# Patient Record
Sex: MALE | Race: BLACK OR AFRICAN AMERICAN | NOT HISPANIC OR LATINO | URBAN - METROPOLITAN AREA
[De-identification: names, ages, dates, MRNs, and addresses within clinical notes are randomized per-mention and may not be internally consistent; named-entity substitution may affect disease eponyms.]

---

## 2020-04-05 ENCOUNTER — EMERGENCY (EMERGENCY)
Facility: HOSPITAL | Age: 75
LOS: 0 days | Discharge: HOME | End: 2020-04-05
Attending: EMERGENCY MEDICINE
Payer: MEDICARE

## 2020-04-05 VITALS
DIASTOLIC BLOOD PRESSURE: 73 MMHG | OXYGEN SATURATION: 98 % | HEART RATE: 77 BPM | RESPIRATION RATE: 16 BRPM | TEMPERATURE: 98 F | SYSTOLIC BLOOD PRESSURE: 118 MMHG

## 2020-04-05 VITALS
TEMPERATURE: 99 F | DIASTOLIC BLOOD PRESSURE: 66 MMHG | SYSTOLIC BLOOD PRESSURE: 133 MMHG | OXYGEN SATURATION: 97 % | HEIGHT: 63 IN | RESPIRATION RATE: 16 BRPM | HEART RATE: 67 BPM | WEIGHT: 154.98 LBS

## 2020-04-05 DIAGNOSIS — Z88.2 ALLERGY STATUS TO SULFONAMIDES: ICD-10-CM

## 2020-04-05 DIAGNOSIS — K57.32 DIVERTICULITIS OF LARGE INTESTINE WITHOUT PERFORATION OR ABSCESS WITHOUT BLEEDING: ICD-10-CM

## 2020-04-05 DIAGNOSIS — K62.5 HEMORRHAGE OF ANUS AND RECTUM: ICD-10-CM

## 2020-04-05 DIAGNOSIS — Z79.82 LONG TERM (CURRENT) USE OF ASPIRIN: ICD-10-CM

## 2020-04-05 DIAGNOSIS — I10 ESSENTIAL (PRIMARY) HYPERTENSION: ICD-10-CM

## 2020-04-05 DIAGNOSIS — K40.90 UNILATERAL INGUINAL HERNIA, WITHOUT OBSTRUCTION OR GANGRENE, NOT SPECIFIED AS RECURRENT: Chronic | ICD-10-CM

## 2020-04-05 LAB
ALBUMIN SERPL ELPH-MCNC: 3.2 G/DL — LOW (ref 3.3–5)
ALP SERPL-CCNC: 50 U/L — SIGNIFICANT CHANGE UP (ref 40–120)
ALT FLD-CCNC: 29 U/L — SIGNIFICANT CHANGE UP (ref 12–78)
ANION GAP SERPL CALC-SCNC: 6 MMOL/L — SIGNIFICANT CHANGE UP (ref 5–17)
APTT BLD: 25.5 SEC — LOW (ref 28.5–37)
AST SERPL-CCNC: 32 U/L — SIGNIFICANT CHANGE UP (ref 15–37)
BASOPHILS # BLD AUTO: 0 K/UL — SIGNIFICANT CHANGE UP (ref 0–0.2)
BASOPHILS NFR BLD AUTO: 0 % — SIGNIFICANT CHANGE UP (ref 0–2)
BILIRUB SERPL-MCNC: 0.9 MG/DL — SIGNIFICANT CHANGE UP (ref 0.2–1.2)
BLD GP AB SCN SERPL QL: SIGNIFICANT CHANGE UP
BUN SERPL-MCNC: 15 MG/DL — SIGNIFICANT CHANGE UP (ref 7–23)
CALCIUM SERPL-MCNC: 8.3 MG/DL — LOW (ref 8.5–10.1)
CHLORIDE SERPL-SCNC: 104 MMOL/L — SIGNIFICANT CHANGE UP (ref 96–108)
CO2 SERPL-SCNC: 30 MMOL/L — SIGNIFICANT CHANGE UP (ref 22–31)
CREAT SERPL-MCNC: 1.08 MG/DL — SIGNIFICANT CHANGE UP (ref 0.5–1.3)
EOSINOPHIL # BLD AUTO: 0.46 K/UL — SIGNIFICANT CHANGE UP (ref 0–0.5)
EOSINOPHIL NFR BLD AUTO: 10 % — HIGH (ref 0–6)
GLUCOSE SERPL-MCNC: 110 MG/DL — HIGH (ref 70–99)
HCT VFR BLD CALC: 36.3 % — LOW (ref 39–50)
HGB BLD-MCNC: 12.3 G/DL — LOW (ref 13–17)
INR BLD: 1.13 RATIO — SIGNIFICANT CHANGE UP (ref 0.88–1.16)
LYMPHOCYTES # BLD AUTO: 0.64 K/UL — LOW (ref 1–3.3)
LYMPHOCYTES # BLD AUTO: 14 % — SIGNIFICANT CHANGE UP (ref 13–44)
MANUAL SMEAR VERIFICATION: SIGNIFICANT CHANGE UP
MCHC RBC-ENTMCNC: 30.4 PG — SIGNIFICANT CHANGE UP (ref 27–34)
MCHC RBC-ENTMCNC: 33.9 GM/DL — SIGNIFICANT CHANGE UP (ref 32–36)
MCV RBC AUTO: 89.6 FL — SIGNIFICANT CHANGE UP (ref 80–100)
MONOCYTES # BLD AUTO: 0.83 K/UL — SIGNIFICANT CHANGE UP (ref 0–0.9)
MONOCYTES NFR BLD AUTO: 18 % — HIGH (ref 2–14)
NEUTROPHILS # BLD AUTO: 2.67 K/UL — SIGNIFICANT CHANGE UP (ref 1.8–7.4)
NEUTROPHILS NFR BLD AUTO: 58 % — SIGNIFICANT CHANGE UP (ref 43–77)
NRBC # BLD: 0 /100 — SIGNIFICANT CHANGE UP (ref 0–0)
NRBC # BLD: SIGNIFICANT CHANGE UP /100 WBCS (ref 0–0)
PLAT MORPH BLD: NORMAL — SIGNIFICANT CHANGE UP
PLATELET # BLD AUTO: 135 K/UL — LOW (ref 150–400)
POTASSIUM SERPL-MCNC: 3.6 MMOL/L — SIGNIFICANT CHANGE UP (ref 3.5–5.3)
POTASSIUM SERPL-SCNC: 3.6 MMOL/L — SIGNIFICANT CHANGE UP (ref 3.5–5.3)
PROT SERPL-MCNC: 7 GM/DL — SIGNIFICANT CHANGE UP (ref 6–8.3)
PROTHROM AB SERPL-ACNC: 12.7 SEC — SIGNIFICANT CHANGE UP (ref 10–12.9)
RBC # BLD: 4.05 M/UL — LOW (ref 4.2–5.8)
RBC # FLD: 12.3 % — SIGNIFICANT CHANGE UP (ref 10.3–14.5)
RBC BLD AUTO: NORMAL — SIGNIFICANT CHANGE UP
SODIUM SERPL-SCNC: 140 MMOL/L — SIGNIFICANT CHANGE UP (ref 135–145)
WBC # BLD: 4.6 K/UL — SIGNIFICANT CHANGE UP (ref 3.8–10.5)
WBC # FLD AUTO: 4.6 K/UL — SIGNIFICANT CHANGE UP (ref 3.8–10.5)

## 2020-04-05 PROCEDURE — 74177 CT ABD & PELVIS W/CONTRAST: CPT | Mod: 26

## 2020-04-05 PROCEDURE — 99285 EMERGENCY DEPT VISIT HI MDM: CPT

## 2020-04-05 RX ORDER — IOHEXOL 300 MG/ML
30 INJECTION, SOLUTION INTRAVENOUS ONCE
Refills: 0 | Status: COMPLETED | OUTPATIENT
Start: 2020-04-05 | End: 2020-04-05

## 2020-04-05 RX ORDER — MOXIFLOXACIN HYDROCHLORIDE TABLETS, 400 MG 400 MG/1
1 TABLET, FILM COATED ORAL
Qty: 20 | Refills: 0
Start: 2020-04-05 | End: 2020-04-14

## 2020-04-05 RX ORDER — METRONIDAZOLE 500 MG
1 TABLET ORAL
Qty: 21 | Refills: 0
Start: 2020-04-05 | End: 2020-04-11

## 2020-04-05 RX ORDER — CIPROFLOXACIN LACTATE 400MG/40ML
500 VIAL (ML) INTRAVENOUS ONCE
Refills: 0 | Status: COMPLETED | OUTPATIENT
Start: 2020-04-05 | End: 2020-04-05

## 2020-04-05 RX ORDER — METRONIDAZOLE 500 MG
500 TABLET ORAL ONCE
Refills: 0 | Status: COMPLETED | OUTPATIENT
Start: 2020-04-05 | End: 2020-04-05

## 2020-04-05 RX ADMIN — Medication 500 MILLIGRAM(S): at 06:43

## 2020-04-05 RX ADMIN — IOHEXOL 30 MILLILITER(S): 300 INJECTION, SOLUTION INTRAVENOUS at 03:19

## 2020-04-05 RX ADMIN — Medication 500 MILLIGRAM(S): at 06:44

## 2020-04-05 NOTE — ED PROVIDER NOTE - OBJECTIVE STATEMENT
Pertinent PMH/PSH/FHx/SHx and Review of Systems contained within:    74yo M w PMH of diverticulitis, HTN on 81mg ASA presents to ED for eval of bloody stools tonight.  Pt states sx similar to previous episode of diverticulitis.  Denies fever, chills, abd pain Pertinent PMH/PSH/FHx/SHx and Review of Systems contained within:    74yo M w PMH of diverticulitis, HTN on 81mg ASA presents to ED for eval of bloody stools tonight.  Pt states sx similar to previous episode of diverticulitis.  Denies fever, chills, abd pain, dizziness, SOB, cough.     No fever/chills, No photophobia/eye pain/changes in vision, No ear pain/sore throat/dysphagia, No chest pain/palpitations, no SOB/cough/wheeze/stridor, No abdominal pain, No neck/back pain, no rash, no changes in neurological status/function.

## 2020-04-05 NOTE — ED PROVIDER NOTE - PHYSICAL EXAMINATION
Gen: Alert, NAD  Head: NC, AT, EOMI, normal lids/conjunctiva  ENT: normal hearing, patent oropharynx, MMM  Neck: supple, no tenderness/meningismus/JVD, Trachea midline  Pulm: Bilateral clear BS, normal resp effort, no wheeze/stridor/retractions  CV: RRR, no M/R/G, +dist pulses  Abd: soft, NT/ND, +BS, no guarding/rebound tenderness  Mskel: no edema/erythema/cyanosis  Skin: no rash  Neuro: AAOx3, no sensory/motor deficits

## 2020-04-05 NOTE — ED PROVIDER NOTE - CLINICAL SUMMARY MEDICAL DECISION MAKING FREE TEXT BOX
p H+H stable, given first dose of abx in ED Pt w above dx, H+H stable, given first dose of abx in ED.  Pt given Rx and instructed/cautioned on use. Discussed results and outcome of testing with the patient, given copy as well.  Patient advised to please follow up with their primary care doctor within the next 24 hours and return to the Emergency Department for worsening symptoms or any other concerns.  Patient advised that their doctor may call  to follow up on the specific results of the tests performed today in the emergency department.

## 2020-04-05 NOTE — ED ADULT NURSE NOTE - CAS EDN DISCHARGE ASSESSMENT
Alert and oriented to person, place and time/Awake/Dressing clean and dry/No adverse reaction to first time med in ED

## 2020-04-05 NOTE — ED ADULT NURSE NOTE - NS SC CAGE ALCOHOL ANNOYED YOU
"Requested Prescriptions   Pending Prescriptions Disp Refills     sertraline (ZOLOFT) 50 MG tablet [Pharmacy Med Name: SERTRALINE HCL 50MG TABS]  Last Written Prescription Date:  7/24/2019  Last Fill Quantity: 90 tablet,  # refills: 0   Last office visit: 9/9/2019 with prescribing provider:  Jody     Future Office Visit:       90 tablet 0     Sig: TAKE ONE-HALF TABLET (25 MG) BY MOUTH EVERY DAY FOR 1-2 WEEKS THEN INCREASE TO 1 TABLET (50 MG) DAILY THEREAFTER       SSRIs Protocol Passed - 10/23/2019  8:09 AM        Passed - PHQ-9 score less than 5 in past 6 months     Please review last PHQ-9 score.     PHQ-9 SCORE 7/13/2017 7/24/2019 9/9/2019   PHQ-9 Total Score - - -   PHQ-9 Total Score MyChart - - -   PHQ-9 Total Score 4 15 3     CRISTINA-7 SCORE 7/13/2017 7/24/2019 9/9/2019   Total Score - - -   Total Score - - -   Total Score 3 10 1           Passed - Medication is active on med list        Passed - Patient is age 18 or older        Passed - No active pregnancy on record        Passed - No positive pregnancy test in last 12 months        Passed - Recent (6 mo) or future (30 days) visit within the authorizing provider's specialty     Patient had office visit in the last 6 months or has a visit in the next 30 days with authorizing provider or within the authorizing provider's specialty.  See \"Patient Info\" tab in inbasket, or \"Choose Columns\" in Meds & Orders section of the refill encounter.            " no

## 2020-04-05 NOTE — ED ADULT TRIAGE NOTE - CHIEF COMPLAINT QUOTE
pt c/o rectal bleeding.  states he has hx of diverticulitis and this is what happens when he has a flair up

## 2020-04-05 NOTE — ED ADULT NURSE NOTE - NSIMPLEMENTINTERV_GEN_ALL_ED
Implemented All Universal Safety Interventions:  [place] to call system. Call bell, personal items and telephone within reach. Instruct patient to call for assistance. Room bathroom lighting operational. Non-slip footwear when patient is off stretcher. Physically safe environment: no spills, clutter or unnecessary equipment. Stretcher in lowest position, wheels locked, appropriate side rails in place.

## 2022-04-24 ENCOUNTER — INPATIENT (INPATIENT)
Facility: HOSPITAL | Age: 77
LOS: 6 days | Discharge: HOME | End: 2022-05-01
Attending: FAMILY MEDICINE | Admitting: FAMILY MEDICINE
Payer: MEDICARE

## 2022-04-24 VITALS
HEIGHT: 63 IN | TEMPERATURE: 98 F | SYSTOLIC BLOOD PRESSURE: 152 MMHG | OXYGEN SATURATION: 98 % | HEART RATE: 64 BPM | RESPIRATION RATE: 19 BRPM | DIASTOLIC BLOOD PRESSURE: 76 MMHG | WEIGHT: 149.91 LBS

## 2022-04-24 DIAGNOSIS — K40.90 UNILATERAL INGUINAL HERNIA, WITHOUT OBSTRUCTION OR GANGRENE, NOT SPECIFIED AS RECURRENT: Chronic | ICD-10-CM

## 2022-04-24 LAB
ALBUMIN SERPL ELPH-MCNC: 3.4 G/DL — SIGNIFICANT CHANGE UP (ref 3.3–5)
ALP SERPL-CCNC: 55 U/L — SIGNIFICANT CHANGE UP (ref 40–120)
ALT FLD-CCNC: 21 U/L — SIGNIFICANT CHANGE UP (ref 12–78)
ANION GAP SERPL CALC-SCNC: 8 MMOL/L — SIGNIFICANT CHANGE UP (ref 5–17)
APPEARANCE UR: CLEAR — SIGNIFICANT CHANGE UP
APTT BLD: 27.6 SEC — SIGNIFICANT CHANGE UP (ref 27.5–35.5)
AST SERPL-CCNC: 22 U/L — SIGNIFICANT CHANGE UP (ref 15–37)
BASOPHILS # BLD AUTO: 0.02 K/UL — SIGNIFICANT CHANGE UP (ref 0–0.2)
BASOPHILS NFR BLD AUTO: 0.4 % — SIGNIFICANT CHANGE UP (ref 0–2)
BILIRUB SERPL-MCNC: 0.7 MG/DL — SIGNIFICANT CHANGE UP (ref 0.2–1.2)
BILIRUB UR-MCNC: NEGATIVE — SIGNIFICANT CHANGE UP
BUN SERPL-MCNC: 15 MG/DL — SIGNIFICANT CHANGE UP (ref 7–23)
CALCIUM SERPL-MCNC: 8.7 MG/DL — SIGNIFICANT CHANGE UP (ref 8.5–10.1)
CHLORIDE SERPL-SCNC: 108 MMOL/L — SIGNIFICANT CHANGE UP (ref 96–108)
CO2 SERPL-SCNC: 27 MMOL/L — SIGNIFICANT CHANGE UP (ref 22–31)
COLOR SPEC: YELLOW — SIGNIFICANT CHANGE UP
CREAT SERPL-MCNC: 1.05 MG/DL — SIGNIFICANT CHANGE UP (ref 0.5–1.3)
DIFF PNL FLD: NEGATIVE — SIGNIFICANT CHANGE UP
EGFR: 72 ML/MIN/1.73M2 — SIGNIFICANT CHANGE UP
EOSINOPHIL # BLD AUTO: 0.71 K/UL — HIGH (ref 0–0.5)
EOSINOPHIL NFR BLD AUTO: 13.5 % — HIGH (ref 0–6)
FLUAV AG NPH QL: SIGNIFICANT CHANGE UP
FLUBV AG NPH QL: SIGNIFICANT CHANGE UP
GLUCOSE SERPL-MCNC: 112 MG/DL — HIGH (ref 70–99)
GLUCOSE UR QL: NEGATIVE MG/DL — SIGNIFICANT CHANGE UP
HCT VFR BLD CALC: 39.2 % — SIGNIFICANT CHANGE UP (ref 39–50)
HGB BLD-MCNC: 13.3 G/DL — SIGNIFICANT CHANGE UP (ref 13–17)
IMM GRANULOCYTES NFR BLD AUTO: 0.2 % — SIGNIFICANT CHANGE UP (ref 0–1.5)
INR BLD: 1.02 RATIO — SIGNIFICANT CHANGE UP (ref 0.88–1.16)
KETONES UR-MCNC: NEGATIVE — SIGNIFICANT CHANGE UP
LEUKOCYTE ESTERASE UR-ACNC: NEGATIVE — SIGNIFICANT CHANGE UP
LIDOCAIN IGE QN: 234 U/L — SIGNIFICANT CHANGE UP (ref 73–393)
LYMPHOCYTES # BLD AUTO: 1.35 K/UL — SIGNIFICANT CHANGE UP (ref 1–3.3)
LYMPHOCYTES # BLD AUTO: 25.7 % — SIGNIFICANT CHANGE UP (ref 13–44)
MCHC RBC-ENTMCNC: 30.1 PG — SIGNIFICANT CHANGE UP (ref 27–34)
MCHC RBC-ENTMCNC: 33.9 G/DL — SIGNIFICANT CHANGE UP (ref 32–36)
MCV RBC AUTO: 88.7 FL — SIGNIFICANT CHANGE UP (ref 80–100)
MONOCYTES # BLD AUTO: 0.55 K/UL — SIGNIFICANT CHANGE UP (ref 0–0.9)
MONOCYTES NFR BLD AUTO: 10.5 % — SIGNIFICANT CHANGE UP (ref 2–14)
NEUTROPHILS # BLD AUTO: 2.61 K/UL — SIGNIFICANT CHANGE UP (ref 1.8–7.4)
NEUTROPHILS NFR BLD AUTO: 49.7 % — SIGNIFICANT CHANGE UP (ref 43–77)
NITRITE UR-MCNC: NEGATIVE — SIGNIFICANT CHANGE UP
NRBC # BLD: 0 /100 WBCS — SIGNIFICANT CHANGE UP (ref 0–0)
OB PNL STL: POSITIVE
PH UR: 6.5 — SIGNIFICANT CHANGE UP (ref 5–8)
PLATELET # BLD AUTO: 149 K/UL — LOW (ref 150–400)
POTASSIUM SERPL-MCNC: 3.7 MMOL/L — SIGNIFICANT CHANGE UP (ref 3.5–5.3)
POTASSIUM SERPL-SCNC: 3.7 MMOL/L — SIGNIFICANT CHANGE UP (ref 3.5–5.3)
PROT SERPL-MCNC: 6.4 GM/DL — SIGNIFICANT CHANGE UP (ref 6–8.3)
PROT UR-MCNC: NEGATIVE MG/DL — SIGNIFICANT CHANGE UP
PROTHROM AB SERPL-ACNC: 12.2 SEC — SIGNIFICANT CHANGE UP (ref 10.5–13.4)
RBC # BLD: 4.42 M/UL — SIGNIFICANT CHANGE UP (ref 4.2–5.8)
RBC # FLD: 12.8 % — SIGNIFICANT CHANGE UP (ref 10.3–14.5)
SARS-COV-2 RNA SPEC QL NAA+PROBE: SIGNIFICANT CHANGE UP
SODIUM SERPL-SCNC: 143 MMOL/L — SIGNIFICANT CHANGE UP (ref 135–145)
SP GR SPEC: 1.01 — SIGNIFICANT CHANGE UP (ref 1.01–1.02)
UROBILINOGEN FLD QL: NEGATIVE MG/DL — SIGNIFICANT CHANGE UP
WBC # BLD: 5.25 K/UL — SIGNIFICANT CHANGE UP (ref 3.8–10.5)
WBC # FLD AUTO: 5.25 K/UL — SIGNIFICANT CHANGE UP (ref 3.8–10.5)

## 2022-04-24 PROCEDURE — 99285 EMERGENCY DEPT VISIT HI MDM: CPT

## 2022-04-24 NOTE — ED PROVIDER NOTE - OBJECTIVE STATEMENT
Pt is a 77 year old male w/PMH of hernia in the groin, diverticulitis, HTN, who presents to the ED today for bloody stools. Per wife pt has been passing bright red bloody loose stool x 3 episodes. Pt has had hx of this in the past about 2-3 times. Had colonoscopy done 5 years ago. Found to have diverticulitis. Pt is not on blood thinners. Denies CP, dizziness, generalized weakness, body aches, lightheadedness, abdominal pain or hx of hemorrhoids.

## 2022-04-24 NOTE — ED ADULT NURSE NOTE - NSIMPLEMENTINTERV_GEN_ALL_ED
Implemented All Fall with Harm Risk Interventions:  [place] to call system. Call bell, personal items and telephone within reach. Instruct patient to call for assistance. Room bathroom lighting operational. Non-slip footwear when patient is off stretcher. Physically safe environment: no spills, clutter or unnecessary equipment. Stretcher in lowest position, wheels locked, appropriate side rails in place. Provide visual cue, wrist band, yellow gown, etc. Monitor gait and stability. Monitor for mental status changes and reorient to person, place, and time. Review medications for side effects contributing to fall risk. Reinforce activity limits and safety measures with patient and family. Provide visual clues: red socks.

## 2022-04-24 NOTE — ED ADULT NURSE NOTE - OBJECTIVE STATEMENT
Pt received in bed 1, 76 y/o male, c/o bloody stools X today and pain and swelling in the groin Pt received in bed 1, 78 y/o male, c/o bloody stools X today and pain and swelling in the groin. Pt states he had three episodes of bloody stool today and Pt received in bed 1, 78 y/o male, c/o bloody stools X today and pain and swelling in the groin x 1 week. Pt states he had three episodes of bloody stool today and "lost a lot of blood." Pt has hx of diverticulosis, inguinal hernia, heart stent and hx of bloody stools in the past. Denies abdominal pain, hx of hemorrhoids. Bloody stool noted upon rectal examination by Dr. Joshua. Denies chest pain, SOB, N, V, D, fever, cough, chills, weakness, dizziness, palpitations. Allergic to sulfa medications. Skin intact. Ambulatory with steady gait. Pt on blood thinners. Pt received in bed 1, 78 y/o male, c/o bloody stools X today and pain and swelling in the groin x 1 week. Pt states he had three episodes of bloody stool today and "lost a lot of blood." Pt has hx of diverticulosis, inguinal hernia, heart stent and hx of bloody stools in the past. Denies abdominal pain and hx of hemorrhoids. Blood in the stool noted upon rectal examination by Dr. Joshua. Denies chest pain, SOB, N, V, D, fever, cough, chills, weakness, dizziness, palpitations. Allergic to sulfa medications. Skin intact. Ambulatory with steady gait. Pt on blood thinners.

## 2022-04-24 NOTE — ED ADULT TRIAGE NOTE - CHIEF COMPLAINT QUOTE
Blood in stool since this morning  Bilat groin/abd swelling x2week  HX diverticulitis, hernia, heart stent

## 2022-04-24 NOTE — ED PROVIDER NOTE - PROGRESS NOTE DETAILS
Pt found to have Gi bleed on CT angio. Hgb wnl, bp stable. Dr. Gruber of Gi consulted, recommended admission to hospital, no need for transfer, and patient admitted for further care.

## 2022-04-25 DIAGNOSIS — Z01.818 ENCOUNTER FOR OTHER PREPROCEDURAL EXAMINATION: ICD-10-CM

## 2022-04-25 DIAGNOSIS — K92.2 GASTROINTESTINAL HEMORRHAGE, UNSPECIFIED: ICD-10-CM

## 2022-04-25 DIAGNOSIS — I10 ESSENTIAL (PRIMARY) HYPERTENSION: ICD-10-CM

## 2022-04-25 DIAGNOSIS — I25.10 ATHEROSCLEROTIC HEART DISEASE OF NATIVE CORONARY ARTERY WITHOUT ANGINA PECTORIS: ICD-10-CM

## 2022-04-25 DIAGNOSIS — E78.5 HYPERLIPIDEMIA, UNSPECIFIED: ICD-10-CM

## 2022-04-25 DIAGNOSIS — K46.9 UNSPECIFIED ABDOMINAL HERNIA WITHOUT OBSTRUCTION OR GANGRENE: ICD-10-CM

## 2022-04-25 LAB
BLD GP AB SCN SERPL QL: SIGNIFICANT CHANGE UP
HCT VFR BLD CALC: 28.9 % — LOW (ref 39–50)
HCT VFR BLD CALC: 33.9 % — LOW (ref 39–50)
HCT VFR BLD CALC: 37.9 % — LOW (ref 39–50)
HGB BLD-MCNC: 11.4 G/DL — LOW (ref 13–17)
HGB BLD-MCNC: 12.7 G/DL — LOW (ref 13–17)
HGB BLD-MCNC: 9.9 G/DL — LOW (ref 13–17)
MCHC RBC-ENTMCNC: 29.8 PG — SIGNIFICANT CHANGE UP (ref 27–34)
MCHC RBC-ENTMCNC: 29.8 PG — SIGNIFICANT CHANGE UP (ref 27–34)
MCHC RBC-ENTMCNC: 30.3 PG — SIGNIFICANT CHANGE UP (ref 27–34)
MCHC RBC-ENTMCNC: 33.5 G/DL — SIGNIFICANT CHANGE UP (ref 32–36)
MCHC RBC-ENTMCNC: 33.6 G/DL — SIGNIFICANT CHANGE UP (ref 32–36)
MCHC RBC-ENTMCNC: 34.3 G/DL — SIGNIFICANT CHANGE UP (ref 32–36)
MCV RBC AUTO: 88.4 FL — SIGNIFICANT CHANGE UP (ref 80–100)
MCV RBC AUTO: 88.7 FL — SIGNIFICANT CHANGE UP (ref 80–100)
MCV RBC AUTO: 89 FL — SIGNIFICANT CHANGE UP (ref 80–100)
NRBC # BLD: 0 /100 WBCS — SIGNIFICANT CHANGE UP (ref 0–0)
PLATELET # BLD AUTO: 118 K/UL — LOW (ref 150–400)
PLATELET # BLD AUTO: 122 K/UL — LOW (ref 150–400)
PLATELET # BLD AUTO: 133 K/UL — LOW (ref 150–400)
RBC # BLD: 3.27 M/UL — LOW (ref 4.2–5.8)
RBC # BLD: 3.82 M/UL — LOW (ref 4.2–5.8)
RBC # BLD: 4.26 M/UL — SIGNIFICANT CHANGE UP (ref 4.2–5.8)
RBC # FLD: 12.8 % — SIGNIFICANT CHANGE UP (ref 10.3–14.5)
RBC # FLD: 12.9 % — SIGNIFICANT CHANGE UP (ref 10.3–14.5)
RBC # FLD: 13 % — SIGNIFICANT CHANGE UP (ref 10.3–14.5)
WBC # BLD: 3.79 K/UL — LOW (ref 3.8–10.5)
WBC # BLD: 5.57 K/UL — SIGNIFICANT CHANGE UP (ref 3.8–10.5)
WBC # BLD: 6.44 K/UL — SIGNIFICANT CHANGE UP (ref 3.8–10.5)
WBC # FLD AUTO: 3.79 K/UL — LOW (ref 3.8–10.5)
WBC # FLD AUTO: 5.57 K/UL — SIGNIFICANT CHANGE UP (ref 3.8–10.5)
WBC # FLD AUTO: 6.44 K/UL — SIGNIFICANT CHANGE UP (ref 3.8–10.5)

## 2022-04-25 PROCEDURE — 93306 TTE W/DOPPLER COMPLETE: CPT | Mod: 26

## 2022-04-25 PROCEDURE — 12345: CPT | Mod: NC

## 2022-04-25 PROCEDURE — 71045 X-RAY EXAM CHEST 1 VIEW: CPT | Mod: 26

## 2022-04-25 PROCEDURE — 74174 CTA ABD&PLVS W/CONTRAST: CPT | Mod: 26,MA

## 2022-04-25 PROCEDURE — 93010 ELECTROCARDIOGRAM REPORT: CPT

## 2022-04-25 PROCEDURE — 99222 1ST HOSP IP/OBS MODERATE 55: CPT

## 2022-04-25 RX ORDER — SODIUM CHLORIDE 9 MG/ML
1000 INJECTION, SOLUTION INTRAVENOUS
Refills: 0 | Status: DISCONTINUED | OUTPATIENT
Start: 2022-04-25 | End: 2022-04-29

## 2022-04-25 RX ORDER — SODIUM CHLORIDE 9 MG/ML
1000 INJECTION, SOLUTION INTRAVENOUS
Refills: 0 | Status: COMPLETED | OUTPATIENT
Start: 2022-04-25 | End: 2022-04-25

## 2022-04-25 RX ORDER — ATORVASTATIN CALCIUM 80 MG/1
40 TABLET, FILM COATED ORAL AT BEDTIME
Refills: 0 | Status: DISCONTINUED | OUTPATIENT
Start: 2022-04-25 | End: 2022-05-01

## 2022-04-25 RX ORDER — METOPROLOL TARTRATE 50 MG
25 TABLET ORAL
Refills: 0 | Status: DISCONTINUED | OUTPATIENT
Start: 2022-04-25 | End: 2022-04-26

## 2022-04-25 RX ORDER — PANTOPRAZOLE SODIUM 20 MG/1
40 TABLET, DELAYED RELEASE ORAL
Refills: 0 | Status: DISCONTINUED | OUTPATIENT
Start: 2022-04-25 | End: 2022-05-01

## 2022-04-25 RX ORDER — PETROLATUM,WHITE
1 JELLY (GRAM) TOPICAL
Refills: 0 | Status: DISCONTINUED | OUTPATIENT
Start: 2022-04-25 | End: 2022-05-01

## 2022-04-25 RX ORDER — LISINOPRIL 2.5 MG/1
20 TABLET ORAL DAILY
Refills: 0 | Status: DISCONTINUED | OUTPATIENT
Start: 2022-04-25 | End: 2022-04-26

## 2022-04-25 RX ADMIN — ATORVASTATIN CALCIUM 40 MILLIGRAM(S): 80 TABLET, FILM COATED ORAL at 21:44

## 2022-04-25 RX ADMIN — SODIUM CHLORIDE 80 MILLILITER(S): 9 INJECTION, SOLUTION INTRAVENOUS at 04:50

## 2022-04-25 RX ADMIN — PANTOPRAZOLE SODIUM 40 MILLIGRAM(S): 20 TABLET, DELAYED RELEASE ORAL at 03:38

## 2022-04-25 RX ADMIN — PANTOPRAZOLE SODIUM 40 MILLIGRAM(S): 20 TABLET, DELAYED RELEASE ORAL at 17:06

## 2022-04-25 RX ADMIN — LISINOPRIL 20 MILLIGRAM(S): 2.5 TABLET ORAL at 05:26

## 2022-04-25 RX ADMIN — SODIUM CHLORIDE 80 MILLILITER(S): 9 INJECTION, SOLUTION INTRAVENOUS at 21:43

## 2022-04-25 RX ADMIN — Medication 1 APPLICATION(S): at 17:07

## 2022-04-25 NOTE — H&P ADULT - NSHPLABSRESULTS_GEN_ALL_CORE
Recent Vitals  T(C): 37 (22 @ 02:55), Max: 37 (22 @ 02:55)  HR: 60 (22 @ 02:55) (60 - 65)  BP: 143/74 (22 @ 02:55) (132/- - 152/76)  RR: 11 (22 @ 02:55) (11 - 19)  SpO2: 98% (22 @ 02:55) (98% - 99%)                        12.7   5.57  )-----------( 133      ( 2022 02:34 )             37.9         143  |  108  |  15  ----------------------------<  112<H>  3.7   |  27  |  1.05    Ca    8.7      2022 22:29    TPro  6.4  /  Alb  3.4  /  TBili  0.7  /  DBili  x   /  AST  22  /  ALT  21  /  AlkPhos  55  -    PT/INR - ( 2022 22:29 )   PT: 12.2 sec;   INR: 1.02 ratio         PTT - ( 2022 22:29 )  PTT:27.6 sec  LIVER FUNCTIONS - ( 2022 22:29 )  Alb: 3.4 g/dL / Pro: 6.4 gm/dL / ALK PHOS: 55 U/L / ALT: 21 U/L / AST: 22 U/L / GGT: x           Urinalysis Basic - ( 2022 22:29 )    Color: Yellow / Appearance: Clear / S.010 / pH: x  Gluc: x / Ketone: Negative  / Bili: Negative / Urobili: Negative mg/dL   Blood: x / Protein: Negative mg/dL / Nitrite: Negative   Leuk Esterase: Negative / RBC: x / WBC x   Sq Epi: x / Non Sq Epi: x / Bacteria: x        Home Medications:  aspirin 81 mg oral tablet, chewable: 1 tab(s) orally once a day (2022 22:47)  atorvastatin 40 mg oral tablet: 1 tab(s) orally once a day (2022 22:47)  lisinopril-hydrochlorothiazide 20 mg-25 mg oral tablet: 1 tab(s) orally once a day (2022 22:47)  metoprolol tartrate 25 mg oral tablet: 1 tab(s) orally once a day (2022 22:47)

## 2022-04-25 NOTE — H&P ADULT - NSHPPHYSICALEXAM_GEN_ALL_CORE
Physical exam:  General: patient in no acute distress, resting comfortably  Head:  Atraumatic, Normocephalic  Eyes: EOMI, PERRLA, clear sclera  Neck: Supple, thyroid nontender, non enlarged  Cardio: S1/S2 +ve, regular rate and rhythm, no M/G/R  Resp: clear to ausculation bilaterally, no rales or wheezes  GI: abdomen soft, nontender, non distended, no guarding, BS +ve x 4, reducible hernia in R groin  Ext: no significant pedal edema  Neuro: CN 2-12 intact, no significant motor or sensory deficits.  Skin: No rashes or lesions

## 2022-04-25 NOTE — H&P ADULT - HISTORY OF PRESENT ILLNESS
Patient is a 77M with a PMH of diverticulitis, CAD, HLD who presents to the ED for rectal bleeding.  Patient states that from this morning he has had three episodes of maroon colored stools.  Denies history of weakness, dizziness, chest pain, palpitations, or dyspnea.  Had a colonscopy 5+ years ago when he was diagnosed with diverticulosis.  Admits to chronic constipation for the last 3 years.  Otherwise, patient complains of an inguinal hernia that he feels is worsening.  States that the hernia is often painful and becomes difficult to reduce.  No other complaints. Vitals stable, labs benign.  Will admit to med surg.

## 2022-04-25 NOTE — PROGRESS NOTE ADULT - SUBJECTIVE AND OBJECTIVE BOX
Pt admitted after midnight, so already seen and examined by Nocturnist today. I reviewed chart, discussed w/ and examined patient, but not a formal billable "follow up" visit.    Feels well; BM still has mix of bright red blood and dark red blood.

## 2022-04-25 NOTE — H&P ADULT - ASSESSMENT
Male
Patient is a 77M with a PMH of diverticulitis, CAD, HLD who presents to the ED for rectal bleeding.  Patient states that from this morning he has had three episodes of maroon colored stools.  Denies history of weakness, dizziness, chest pain, palpitations, or dyspnea.  Had a colonscopy 5+ years ago when he was diagnosed with diverticulosis.  Admits to chronic constipation for the last 3 years.  Otherwise, patient complains of an inguinal hernia that he feels is worsening.  States that the hernia is often painful and becomes difficult to reduce.  No other complaints. Vitals stable, labs benign.  Will admit to med surg.

## 2022-04-25 NOTE — PROGRESS NOTE ADULT - ASSESSMENT
Patient is a 77M with a PMH of diverticulitis, CAD, HLD who presents to the ED for rectal bleeding.  Patient states that from this morning he has had three episodes of maroon colored stools.  Denies history of weakness, dizziness, chest pain, palpitations, or dyspnea.  Had a colonscopy 5+ years ago when he was diagnosed with diverticulosis.  Admits to chronic constipation for the last 3 years.  Otherwise, patient complains of an inguinal hernia that he feels is worsening.  States that the hernia is often painful and becomes difficult to reduce.  No other complaints. Vitals stable, labs benign.  Will admit to med surg.     Patient is a 77M with a PMH of diverticulosis, HTN, HLD who presents to the ED for rectal bleeding.  Patient states that from this morning he has had three episodes of maroon colored stools.  Denies history of weakness, dizziness, chest pain, palpitations, or dyspnea.  Had a colonscopy 5+ years ago when he was diagnosed with diverticulosis.  Admits to chronic constipation for the last 3 years.  Otherwise, patient complains of an inguinal hernia that he feels is worsening.  States that the hernia is often painful and becomes difficult to reduce.  No other complaints. Vitals stable, labs benign.  Will admit to med surg.     Patient is a 77M with a PMH of diverticulosis, CAD, HTN, HLD who presents to the ED for rectal bleeding.  Patient states that from this morning he has had three episodes of maroon colored stools.  Denies history of weakness, dizziness, chest pain, palpitations, or dyspnea.  Had a colonscopy 5+ years ago when he was diagnosed with diverticulosis.  Admits to chronic constipation for the last 3 years.  Otherwise, patient complains of an inguinal hernia that he feels is worsening.  States that the hernia is often painful and becomes difficult to reduce.  No other complaints. Vitals stable, labs benign.  Will admit to med surg.      Preop Evaluation    *Cardiovascular    Hx of coronary disease; 1 stent; EKG shows SR at 70; no ischemia    Blood pressure controlled    Activity level: "high" per patient; Walks 1+ mile multiple times per week;  no chest pain or excessive SOB w/ activity    last saw his cardio > 1 year ago, so will get echo; ordered/pending...    *Pulmonary    No Hx of COPD or Asthma    Non-smoker    CXR not yet done; ordered/pending...    *Electrolytes    Potassium normal    Other electrolytes normal    *Endocrine          No history of diabetes    *Renal         EGFR normal    *Coagulation/Hematology    PT, PTT normal    No known history of Bleeding/Clotting issues    H/H only mildly diminished due to GI bleed    *Anesthesia issues w/ prior surgery?    Prior surgery includes: R inguinal hernia, approx. 1985    Had no issues w/ anesthesia, excessive bleeding or excessive clotting.    No contraindications found so far; awaiting CXR and echo  if both look OK, then Pt will be deemed medically optimized for the planned procedure

## 2022-04-25 NOTE — PATIENT PROFILE ADULT - FALL HARM RISK - UNIVERSAL INTERVENTIONS
Bed in lowest position, wheels locked, appropriate side rails in place/Call bell, personal items and telephone in reach/Instruct patient to call for assistance before getting out of bed or chair/Non-slip footwear when patient is out of bed/[place] to call system/Physically safe environment - no spills, clutter or unnecessary equipment/Purposeful Proactive Rounding/Room/bathroom lighting operational, light cord in reach

## 2022-04-25 NOTE — PROGRESS NOTE ADULT - PROBLEM SELECTOR PLAN 2
metoprolol, lisinopril metoprolol, lisinopril  Possible Hx of CAD -     check EKG Stable  1 stent, 2+ years ago  sees Dr. Hermann Garcia  last seen approx. 12/2020 and was told he's stable

## 2022-04-25 NOTE — H&P ADULT - NSHPREVIEWOFSYSTEMS_GEN_ALL_CORE
Constitutional: no fever, chills, night sweats  Ears: no hearing changes or ear pain,   Nose: no nasal congestion, sinus pain, or rhinorrhea  Cardio: no chest pain, orthopnea, edema, or palpitations  Resp: no dyspnea, cough, wheezing  GI: hematochezia, constipation positive.  No nausea, vomiting, diarrhea, or melena  : no dysuria, urinary frequency, hematuria  MSK: no back pain, neck pain  Skin: no rash, pruritis   Neuro: no weakness, dizziness, lightheadedness, syncope   Heme/Lymph: no bruising or bleeding

## 2022-04-25 NOTE — PROGRESS NOTE ADULT - PROBLEM SELECTOR PLAN 1
Pt states his baseline Hgb is ~14  Follow H/H  NPO.  Protonix 40 IVP BID  GI consult pending ([facility]) Prior diverticular bleed in 2014 (colonoscopy also showed a polyp)  CT angio suggests source in the R transverse colon  Pt states his baseline Hgb is ~14  Follow H/H; has been slowly drifting downward  NPO.  Protonix 40 IVP BID  GI consult pending ([facility]) Prior diverticular bleed in 2014 (colonoscopy also showed a polyp)  CT angio suggests source in the R transverse colon  Pt states his baseline Hgb is ~14  Follow H/H; has been slowly drifting downward  NPO.  Protonix 40 IVP BID  Holding ASA and Plavix  GI consult pending ([facility])

## 2022-04-25 NOTE — PROGRESS NOTE ADULT - PROBLEM SELECTOR PLAN 4
no significant pain, currently reducible.  Consider surgical eval if needed Originally repaired ~1985; no longer sees a surgeon  no significant pain, currently reducible  Consider surgical eval if symptoms recur/persist

## 2022-04-26 LAB
ANION GAP SERPL CALC-SCNC: 6 MMOL/L — SIGNIFICANT CHANGE UP (ref 5–17)
BUN SERPL-MCNC: 15 MG/DL — SIGNIFICANT CHANGE UP (ref 7–23)
CALCIUM SERPL-MCNC: 8.3 MG/DL — LOW (ref 8.5–10.1)
CHLORIDE SERPL-SCNC: 108 MMOL/L — SIGNIFICANT CHANGE UP (ref 96–108)
CO2 SERPL-SCNC: 27 MMOL/L — SIGNIFICANT CHANGE UP (ref 22–31)
CREAT SERPL-MCNC: 1.06 MG/DL — SIGNIFICANT CHANGE UP (ref 0.5–1.3)
CULTURE RESULTS: SIGNIFICANT CHANGE UP
EGFR: 71 ML/MIN/1.73M2 — SIGNIFICANT CHANGE UP
GLUCOSE SERPL-MCNC: 93 MG/DL — SIGNIFICANT CHANGE UP (ref 70–99)
HCT VFR BLD CALC: 28.2 % — LOW (ref 39–50)
HGB BLD-MCNC: 9.4 G/DL — LOW (ref 13–17)
MAGNESIUM SERPL-MCNC: 2 MG/DL — SIGNIFICANT CHANGE UP (ref 1.6–2.6)
MCHC RBC-ENTMCNC: 29.8 PG — SIGNIFICANT CHANGE UP (ref 27–34)
MCHC RBC-ENTMCNC: 33.3 G/DL — SIGNIFICANT CHANGE UP (ref 32–36)
MCV RBC AUTO: 89.5 FL — SIGNIFICANT CHANGE UP (ref 80–100)
NRBC # BLD: 0 /100 WBCS — SIGNIFICANT CHANGE UP (ref 0–0)
PLATELET # BLD AUTO: 114 K/UL — LOW (ref 150–400)
POTASSIUM SERPL-MCNC: 3.6 MMOL/L — SIGNIFICANT CHANGE UP (ref 3.5–5.3)
POTASSIUM SERPL-SCNC: 3.6 MMOL/L — SIGNIFICANT CHANGE UP (ref 3.5–5.3)
RBC # BLD: 3.15 M/UL — LOW (ref 4.2–5.8)
RBC # FLD: 13 % — SIGNIFICANT CHANGE UP (ref 10.3–14.5)
SODIUM SERPL-SCNC: 141 MMOL/L — SIGNIFICANT CHANGE UP (ref 135–145)
SPECIMEN SOURCE: SIGNIFICANT CHANGE UP
WBC # BLD: 5.38 K/UL — SIGNIFICANT CHANGE UP (ref 3.8–10.5)
WBC # FLD AUTO: 5.38 K/UL — SIGNIFICANT CHANGE UP (ref 3.8–10.5)

## 2022-04-26 PROCEDURE — 99233 SBSQ HOSP IP/OBS HIGH 50: CPT

## 2022-04-26 PROCEDURE — 74018 RADEX ABDOMEN 1 VIEW: CPT | Mod: 26

## 2022-04-26 RX ADMIN — Medication 1 APPLICATION(S): at 05:38

## 2022-04-26 RX ADMIN — PANTOPRAZOLE SODIUM 40 MILLIGRAM(S): 20 TABLET, DELAYED RELEASE ORAL at 05:38

## 2022-04-26 RX ADMIN — ATORVASTATIN CALCIUM 40 MILLIGRAM(S): 80 TABLET, FILM COATED ORAL at 21:41

## 2022-04-26 RX ADMIN — SODIUM CHLORIDE 80 MILLILITER(S): 9 INJECTION, SOLUTION INTRAVENOUS at 05:38

## 2022-04-26 RX ADMIN — Medication 1 APPLICATION(S): at 17:45

## 2022-04-26 RX ADMIN — PANTOPRAZOLE SODIUM 40 MILLIGRAM(S): 20 TABLET, DELAYED RELEASE ORAL at 17:43

## 2022-04-26 RX ADMIN — SODIUM CHLORIDE 80 MILLILITER(S): 9 INJECTION, SOLUTION INTRAVENOUS at 20:41

## 2022-04-26 NOTE — PROGRESS NOTE ADULT - SUBJECTIVE AND OBJECTIVE BOX
Patient is a 77y old  Male who presents with a chief complaint of GI bleed (2022 14:30)      HPI:  Patient is a 77M with a PMH of diverticulitis, CAD, HLD who presents to the ED for rectal bleeding.  Patient states that from this morning he has had three episodes of maroon colored stools.  Denies history of weakness, dizziness, chest pain, palpitations, or dyspnea.  Had a colonscopy 5+ years ago when he was diagnosed with diverticulosis.  Admits to chronic constipation for the last 3 years.  Otherwise, patient complains of an inguinal hernia that he feels is worsening.  States that the hernia is often painful and becomes difficult to reduce.  No other complaints. Vitals stable, labs benign.  Will admit to med surg.     (2022 03:24)      INTERVAL HPI/OVERNIGHT EVENTS:  One small bloody BM overnight. Vomited once after drinking Ensure light. Otherwise, the patient denies melena, hematemesis, nausea,  abdominal pain, constipation, diarrhea,    MEDICATIONS  (STANDING):  AQUAPHOR (petrolatum Ointment) 1 Application(s) Topical two times a day  atorvastatin 40 milliGRAM(s) Oral at bedtime  lactated ringers. 1000 milliLiter(s) (80 mL/Hr) IV Continuous <Continuous>  lisinopril 20 milliGRAM(s) Oral daily  metoprolol tartrate 25 milliGRAM(s) Oral two times a day  pantoprazole  Injectable 40 milliGRAM(s) IV Push two times a day    MEDICATIONS  (PRN):      FAMILY HISTORY:  FH: HTN (hypertension)        Allergies    sulfa drugs (Unknown)    Intolerances        PMH/PSH:  Hypertension    Diverticulosis    Hernia, inguinal, right          REVIEW OF SYSTEMS:  CONSTITUTIONAL: No fever, weight loss,   EYES: No eye pain, visual disturbances, or discharge  ENMT:  No difficulty hearing, tinnitus, vertigo; No sinus or throat pain  NECK: No pain or stiffness  BREASTS: No pain, masses, or nipple discharge  RESPIRATORY: No cough, wheezing, chills or hemoptysis; No shortness of breath  CARDIOVASCULAR: No chest pain, palpitations, dizziness, or leg swelling  GASTROINTESTINAL: See above   GENITOURINARY: No dysuria, frequency, hematuria, or incontinence  NEUROLOGICAL: No headaches, memory loss, loss of strength, numbness, or tremors  SKIN: No itching, burning, rashes, or lesions   LYMPH NODES: No enlarged glands  ENDOCRINE: No heat or cold intolerance; No hair loss  MUSCULOSKELETAL: No joint pain or swelling; No muscle, back, or extremity pain  PSYCHIATRIC: No depression, anxiety, mood swings, or difficulty sleeping  HEME/LYMPH: No easy bruising, or bleeding gums  ALLERGY AND IMMUNOLOGIC: No hives or eczema    Vital Signs Last 24 Hrs  T(C): 36.4 (2022 05:38), Max: 36.8 (2022 00:12)  T(F): 97.6 (2022 05:38), Max: 98.2 (2022 00:12)  HR: 78 (2022 05:38) (65 - 78)  BP: 99/66 (2022 05:38) (99/64 - 119/75)  BP(mean): --  RR: 18 (2022 05:38) (17 - 18)  SpO2: 99% (2022 05:38) (97% - 99%)    PHYSICAL EXAM:  GENERAL: NAD, well-groomed, well-developed  HEAD:  Atraumatic, Normocephalic  EYES: EOMI, PERRLA, conjunctiva and sclera clear  NECK: Supple, No JVD, Normal thyroid  NERVOUS SYSTEM:  Alert & Oriented X3, Good concentration; Motor Strength 5/5 B/L upper and lower extremities;   CHEST/LUNG: Clear to percussion bilaterally; No rales, rhonchi, wheezing, or rubs  HEART: Regular rate and rhythm; No murmurs, rubs, or gallops  ABDOMEN: Soft, Nontender, Nondistended; Bowel sounds present  EXTREMITIES:  2+ Peripheral Pulses, No clubbing, cyanosis, or edema  LYMPH: No lymphadenopathy noted  SKIN: No rashes or lesions    LAB   @ 22:29  amylase --   lipase 234                           9.4    5.38  )-----------( 114      ( 2022 06:58 )             28.2       CBC:   @ 06:58  WBC 5.38   Hgb 9.4   Hct 28.2   Plts 114  MCV 89.5   @ 23:07  WBC 6.44   Hgb 9.9   Hct 28.9   Plts 118  MCV 88.4   @ 10:28  WBC 3.79   Hgb 11.4   Hct 33.9   Plts 122  MCV 88.7   @ 02:34  WBC 5.57   Hgb 12.7   Hct 37.9   Plts 133  MCV 89.0   @ 22:29  WBC 5.25   Hgb 13.3   Hct 39.2   Plts 149  MCV 88.7      Chemistry:   @ 06:58  Na+ 141  K+ 3.6  Cl- 108  CO2 27  BUN 15  Cr 1.06      @ 22:29  Na+ 143  K+ 3.7  Cl- 108  CO2 27  BUN 15  Cr 1.05         Glucose, Serum: 93 mg/dL ( @ 06:58)  Glucose, Serum: 112 mg/dL ( @ 22:29)      2022 06:58    141    |  108    |  15     ----------------------------<  93     3.6     |  27     |  1.06   2022 22:29    143    |  108    |  15     ----------------------------<  112    3.7     |  27     |  1.05     Ca    8.3        2022 06:58  Ca    8.7        2022 22:29  Mg     2.0       2022 06:58    TPro  6.4    /  Alb  3.4    /  TBili  0.7    /  DBili  x      /  AST  22     /  ALT  21     /  AlkPhos  55     2022 22:29      PT/INR - ( 2022 22:29 )   PT: 12.2 sec;   INR: 1.02 ratio         PTT - ( 2022 22:29 )  PTT:27.6 sec    Urinalysis Basic - ( 2022 22:29 )    Color: Yellow / Appearance: Clear / S.010 / pH: x  Gluc: x / Ketone: Negative  / Bili: Negative / Urobili: Negative mg/dL   Blood: x / Protein: Negative mg/dL / Nitrite: Negative   Leuk Esterase: Negative / RBC: x / WBC x   Sq Epi: x / Non Sq Epi: x / Bacteria: x        CAPILLARY BLOOD GLUCOSE              RADIOLOGY & ADDITIONAL TESTS:    Imaging Personally Reviewed:  [ ] YES  [ ] NO    Consultant(s) Notes Reviewed:  [ ] YES  [ ] NO    Care Discussed with Consultants/Other Providers [ ] YES  [ ] NO

## 2022-04-26 NOTE — PROGRESS NOTE ADULT - ASSESSMENT
Patient is a 77M with a PMH of diverticulosis, CAD, HTN, HLD who presents to the ED for rectal bleeding.  Patient states that from this morning he has had three episodes of maroon colored stools.  Denies history of weakness, dizziness, chest pain, palpitations, or dyspnea.  Had a colonscopy 5+ years ago when he was diagnosed with diverticulosis.  Admits to chronic constipation for the last 3 years.  Otherwise, patient complains of an inguinal hernia that he feels is worsening.  States that the hernia is often painful and becomes difficult to reduce.  No other complaints. Vitals stable, labs benign.  Will admit to med surg.      ABLA/ LGIB   CT Angio Abdomen and Pelvis w/ IV Cont:   IMPRESSION: Active gastrointestinal hemorrhage in the proximal transverse colon.  Stool Occult +  cont to hold ASA and Plavix   cont w/ Protonix   H/H stable, cont to monitor and transfuse prn   GI consult appreciated; Colonoscopy on Thursday     Hypotension:   hold Lisinopril and metoprolol due to hypotension     CAD s/p stent   hold ASA and Plavix due to GIB   1 stent, 2+ years ago  sees Dr. Hermann Garcia most likely will no longer need Plavix  on discharge   cont w/ statin     Inguinal hernia   Originally repaired ~1985; no longer sees a surgeon  pt admits to right groin bulging and abdominal pain on admission   pt denies any pain at this time and it is reducible   pt can follow up with surgery as outpt unless he becomes symptomatic     DVTp: SCDs, AC contraindicated

## 2022-04-26 NOTE — PROGRESS NOTE ADULT - SUBJECTIVE AND OBJECTIVE BOX
Patient is a 77y old  Male who presents with a chief complaint of GI bleed (2022 08:34)      INTERVAL HPI/ OVERNIGHT EVENTS: Pt was seen and examined at bedside today, hypotensive today, pt admits to having an episode of vomiting yesterday evening, Denies Abdominal pain, n/V or GIB bleeding this morning,     MEDICATIONS  (STANDING):  AQUAPHOR (petrolatum Ointment) 1 Application(s) Topical two times a day  atorvastatin 40 milliGRAM(s) Oral at bedtime  lactated ringers. 1000 milliLiter(s) (80 mL/Hr) IV Continuous <Continuous>  pantoprazole  Injectable 40 milliGRAM(s) IV Push two times a day    MEDICATIONS  (PRN):      Allergies    sulfa drugs (Unknown)    Intolerances        REVIEW OF SYSTEMS:    Unable to examine due to [ ] Encephalopathy [ ] Advanced Dementia [ ] Expressive Aphasia [ ] Non-verbal patient    CONSTITUTIONAL: No fever, NO generalized weakness/Fatigue, No weight loss  EYES: No eye pain, visual disturbances, or discharge  ENMT:  No difficulty hearing, tinnitus, vertigo; No sinus or throat pain  NECK: No pain or stiffness  RESPIRATORY: No shortness of breath,  cough, wheezing, sputum or hemoptysis   CARDIOVASCULAR: No chest pain, palpitations, or leg swelling  GASTROINTESTINAL: No abdominal pain. No nausea, vomiting, diarrhea or constipation. No melena or hematochezia.  GENITOURINARY: No dysuria, frequency, hematuria, or incontinence  NEUROLOGICAL: No headaches, Dizziness, memory loss, loss of strength, numbness, or tremors  SKIN: No itching, burning, rashes, or lesions   MUSCULOSKELETAL: No joint pain or swelling; No muscle, back, or extremity pain  PSYCHIATRIC: No depression, anxiety, mood swings, or difficulty sleeping  HEME/LYMPH: No easy bruising, or bleeding gums      Vital Signs Last 24 Hrs  T(C): 36.6 (2022 11:09), Max: 36.8 (2022 00:12)  T(F): 97.8 (2022 11:09), Max: 98.2 (2022 00:12)  HR: 71 (2022 11:09) (67 - 78)  BP: 124/72 (2022 11:09) (99/66 - 124/72)  BP(mean): --  RR: 17 (2022 11:09) (17 - 18)  SpO2: 96% (2022 11:09) (96% - 99%)    PHYSICAL EXAM:  GENERAL: NAD, well-developed, well-groomed  HEAD:  Atraumatic, Normocephalic  EYES: conjunctiva and sclera clear  ENMT: Moist mucous membranes  NECK: Supple, No JVD, Normal thyroid  CHEST/LUNG: Clear to Auscultation bilaterally; No rales, rhonchi, wheezing, or rubs  HEART: Regular rate and rhythm; No murmurs, rubs, or gallops  ABDOMEN: Soft, Nontender, Nondistended; Bowel sounds present  EXTREMITIES:  2+ Peripheral Pulses, No clubbing, cyanosis, or edema  SKIN: No rashes or lesions  NERVOUS SYSTEM:  Alert & Oriented X3, Good concentration; Motor Strength 5/5 B/L upper and lower extremities    LABS:                        9.4    5.38  )-----------( 114      ( 2022 06:58 )             28.2         141  |  108  |  15  ----------------------------<  93  3.6   |  27  |  1.06    Ca    8.3<L>      2022 06:58  Mg     2.0         TPro  6.4  /  Alb  3.4  /  TBili  0.7  /  DBili  x   /  AST  22  /  ALT  21  /  AlkPhos  55      PT/INR - ( 2022 22:29 )   PT: 12.2 sec;   INR: 1.02 ratio         PTT - ( 2022 22:29 )  PTT:27.6 sec  Urinalysis Basic - ( 2022 22:29 )    Color: Yellow / Appearance: Clear / S.010 / pH: x  Gluc: x / Ketone: Negative  / Bili: Negative / Urobili: Negative mg/dL   Blood: x / Protein: Negative mg/dL / Nitrite: Negative   Leuk Esterase: Negative / RBC: x / WBC x   Sq Epi: x / Non Sq Epi: x / Bacteria: x      CAPILLARY BLOOD GLUCOSE            Culture - Urine (collected 22)  Source: Clean Catch Clean Catch (Midstream)  Final Report (22):    <10,000 CFU/mL Normal Urogenital Monique        RADIOLOGY & ADDITIONAL TESTS:          Imaging Personally Reviewed:  [ ] YES  [ ] NO    Consultant(s) Notes Reviewed:  [ ] YES  [ ] NO    Care Discussed with Consultants/Other Providers [x ] YES  [ ] NO

## 2022-04-26 NOTE — PROGRESS NOTE ADULT - ASSESSMENT
HPI:  Patient is a 77M with a PMH of diverticulitis, CAD, HLD who presents to the ED for rectal bleeding.  Patient states that from this morning he has had three episodes of maroon colored stools.  Denies history of weakness, dizziness, chest pain, palpitations, or dyspnea.  Had a colonscopy 5+ years ago when he was diagnosed with diverticulosis.  Admits to chronic constipation for the last 3 years.  Otherwise, patient complains of an inguinal hernia that he feels is worsening.  States that the hernia is often painful and becomes difficult to reduce.  No other complaints. Vitals stable, labs benign.  Will admit to med surg.     (25 Apr 2022 03:24)  ------ As Above ---------  The patient presented with 3 bouts of maroon BMs. Painless (+). No N/V. Patient is on ASA and Plavix (+) ( Cardiac stents x 1 year ). CT angio (+) proximal transverse bleed.  Patient had a similar episode 6 years ago and had a w/u which revealed diverticulosis ( by colonoscopy )   Patient had a one smaller maroon BM earlier today.   See labs / CT angio    A) Lower GI Bleed,  proximal transverse by CT angio - Patient was on Plavix / ASA,  1) Stop anti coagulants 2) Colonoscopy Thursday ( will need medical clearance )  3) F/U labs 4) Platelets if patient continues to bleed. 5) Telemetry monitoring 6) Clear liquid diet  B) Vomiting x 1 after drinking Clear ensure R/O Obstruction, ileus food sensitivity  1) KUB 2) Do not advance diet til KUB checked 3) No Clear ensure 4) check electrolytes

## 2022-04-27 LAB
ANION GAP SERPL CALC-SCNC: 6 MMOL/L — SIGNIFICANT CHANGE UP (ref 5–17)
BLD GP AB SCN SERPL QL: SIGNIFICANT CHANGE UP
BUN SERPL-MCNC: 9 MG/DL — SIGNIFICANT CHANGE UP (ref 7–23)
CALCIUM SERPL-MCNC: 7.7 MG/DL — LOW (ref 8.5–10.1)
CHLORIDE SERPL-SCNC: 110 MMOL/L — HIGH (ref 96–108)
CO2 SERPL-SCNC: 27 MMOL/L — SIGNIFICANT CHANGE UP (ref 22–31)
CREAT SERPL-MCNC: 1.03 MG/DL — SIGNIFICANT CHANGE UP (ref 0.5–1.3)
EGFR: 74 ML/MIN/1.73M2 — SIGNIFICANT CHANGE UP
GLUCOSE SERPL-MCNC: 80 MG/DL — SIGNIFICANT CHANGE UP (ref 70–99)
HCT VFR BLD CALC: 22.2 % — LOW (ref 39–50)
HGB BLD-MCNC: 7.3 G/DL — LOW (ref 13–17)
MCHC RBC-ENTMCNC: 29.6 PG — SIGNIFICANT CHANGE UP (ref 27–34)
MCHC RBC-ENTMCNC: 32.9 G/DL — SIGNIFICANT CHANGE UP (ref 32–36)
MCV RBC AUTO: 89.9 FL — SIGNIFICANT CHANGE UP (ref 80–100)
NRBC # BLD: 0 /100 WBCS — SIGNIFICANT CHANGE UP (ref 0–0)
PLATELET # BLD AUTO: 104 K/UL — LOW (ref 150–400)
POTASSIUM SERPL-MCNC: 3.8 MMOL/L — SIGNIFICANT CHANGE UP (ref 3.5–5.3)
POTASSIUM SERPL-SCNC: 3.8 MMOL/L — SIGNIFICANT CHANGE UP (ref 3.5–5.3)
RBC # BLD: 2.47 M/UL — LOW (ref 4.2–5.8)
RBC # FLD: 12.7 % — SIGNIFICANT CHANGE UP (ref 10.3–14.5)
SODIUM SERPL-SCNC: 143 MMOL/L — SIGNIFICANT CHANGE UP (ref 135–145)
WBC # BLD: 4.27 K/UL — SIGNIFICANT CHANGE UP (ref 3.8–10.5)
WBC # FLD AUTO: 4.27 K/UL — SIGNIFICANT CHANGE UP (ref 3.8–10.5)

## 2022-04-27 PROCEDURE — 99221 1ST HOSP IP/OBS SF/LOW 40: CPT

## 2022-04-27 PROCEDURE — 99233 SBSQ HOSP IP/OBS HIGH 50: CPT

## 2022-04-27 RX ORDER — SOD SULF/SODIUM/NAHCO3/KCL/PEG
1000 SOLUTION, RECONSTITUTED, ORAL ORAL ONCE
Refills: 0 | Status: COMPLETED | OUTPATIENT
Start: 2022-04-27 | End: 2022-04-27

## 2022-04-27 RX ORDER — SOD SULF/SODIUM/NAHCO3/KCL/PEG
1000 SOLUTION, RECONSTITUTED, ORAL ORAL ONCE
Refills: 0 | Status: COMPLETED | OUTPATIENT
Start: 2022-04-27 | End: 2022-04-28

## 2022-04-27 RX ADMIN — ATORVASTATIN CALCIUM 40 MILLIGRAM(S): 80 TABLET, FILM COATED ORAL at 21:53

## 2022-04-27 RX ADMIN — SODIUM CHLORIDE 80 MILLILITER(S): 9 INJECTION, SOLUTION INTRAVENOUS at 21:00

## 2022-04-27 RX ADMIN — Medication 1 APPLICATION(S): at 21:51

## 2022-04-27 RX ADMIN — Medication 1000 MILLILITER(S): at 21:53

## 2022-04-27 RX ADMIN — PANTOPRAZOLE SODIUM 40 MILLIGRAM(S): 20 TABLET, DELAYED RELEASE ORAL at 05:59

## 2022-04-27 RX ADMIN — PANTOPRAZOLE SODIUM 40 MILLIGRAM(S): 20 TABLET, DELAYED RELEASE ORAL at 21:54

## 2022-04-27 NOTE — PROGRESS NOTE ADULT - PROBLEM SELECTOR PLAN 2
Was hypotensive earlier but now pressure has stabilized  will resume lisinopril 20mg today  hold HCT 25 mg for  now

## 2022-04-27 NOTE — PROGRESS NOTE ADULT - PROBLEM SELECTOR PLAN 1
Patient with bright red blood per rectum  Hb dropped from 13 to 7  In the setting of h/o CAD will transfuse 1 Unit PRBC.  Of note plavix and aspirin have been on hold for two days and still with acute bleeding, it is likely that platelet are non functional, will transfuse 1 unit of Plts and reassess.

## 2022-04-27 NOTE — PROGRESS NOTE ADULT - SUBJECTIVE AND OBJECTIVE BOX
Patient is a 77y old  Male who presents with a chief complaint of GI bleed (26 Apr 2022 11:44)      HPI:  Patient is a 77M with a PMH of diverticulitis, CAD, HLD who presents to the ED for rectal bleeding.  Patient states that from this morning he has had three episodes of maroon colored stools.  Denies history of weakness, dizziness, chest pain, palpitations, or dyspnea.  Had a colonscopy 5+ years ago when he was diagnosed with diverticulosis.  Admits to chronic constipation for the last 3 years.  Otherwise, patient complains of an inguinal hernia that he feels is worsening.  States that the hernia is often painful and becomes difficult to reduce.  No other complaints. Vitals stable, labs benign.  Will admit to med surg.     (25 Apr 2022 03:24)      INTERVAL HPI/OVERNIGHT EVENTS:  Patient had one bloody BM today. No N/V or abdominal pain. Tolerating clear liquids.    MEDICATIONS  (STANDING):  AQUAPHOR (petrolatum Ointment) 1 Application(s) Topical two times a day  atorvastatin 40 milliGRAM(s) Oral at bedtime  lactated ringers. 1000 milliLiter(s) (80 mL/Hr) IV Continuous <Continuous>  pantoprazole  Injectable 40 milliGRAM(s) IV Push two times a day    MEDICATIONS  (PRN):      FAMILY HISTORY:  FH: HTN (hypertension)        Allergies    sulfa drugs (Unknown)    Intolerances        PMH/PSH:  Hypertension    Diverticulosis    Hernia, inguinal, right          REVIEW OF SYSTEMS:  CONSTITUTIONAL: No fever, weight loss,   EYES: No eye pain, visual disturbances, or discharge  ENMT:  No difficulty hearing, tinnitus, vertigo; No sinus or throat pain  NECK: No pain or stiffness  BREASTS: No pain, masses, or nipple discharge  RESPIRATORY: No cough, wheezing, chills or hemoptysis; No shortness of breath  CARDIOVASCULAR: No chest pain, palpitations, dizziness, or leg swelling  GASTROINTESTINAL: See above   GENITOURINARY: No dysuria, frequency, hematuria, or incontinence  NEUROLOGICAL: No headaches, memory loss, numbness, or tremors  SKIN: No itching, burning, rashes, or lesions   LYMPH NODES: No enlarged glands  ENDOCRINE: No heat or cold intolerance; No hair loss  MUSCULOSKELETAL: No joint pain or swelling; No muscle, back, or extremity pain  PSYCHIATRIC: No depression, anxiety, mood swings, or difficulty sleeping  HEME/LYMPH: No easy bruising, or bleeding gums  ALLERGY AND IMMUNOLOGIC: No hives or eczema    Vital Signs Last 24 Hrs  T(C): 36.9 (27 Apr 2022 11:30), Max: 36.9 (27 Apr 2022 11:30)  T(F): 98.4 (27 Apr 2022 11:30), Max: 98.4 (27 Apr 2022 11:30)  HR: 75 (27 Apr 2022 11:30) (69 - 83)  BP: 128/73 (27 Apr 2022 11:30) (103/62 - 138/74)  BP(mean): --  RR: 18 (27 Apr 2022 11:30) (18 - 18)  SpO2: 98% (27 Apr 2022 11:30) (98% - 100%)    PHYSICAL EXAM:  GENERAL: NAD, well-groomed, well-developed  HEAD:  Atraumatic, Normocephalic  EYES: EOMI, PERRLA, conjunctiva and sclera clear  NECK: Supple, No JVD, Normal thyroid  NERVOUS SYSTEM:  Alert & Oriented X3, Good concentration; Motor Strength 5/5 B/L upper and lower extremities;   CHEST/LUNG: Clear to percussion bilaterally; No rales, rhonchi, wheezing, or rubs  HEART: Regular rate and rhythm; No murmurs, rubs, or gallops  ABDOMEN: Soft, Nontender, Nondistended; Bowel sounds present  EXTREMITIES:  2+ Peripheral Pulses, No clubbing, cyanosis, or edema  LYMPH: No lymphadenopathy noted  SKIN: No rashes or lesions    LAB  04-24 @ 22:29  amylase --   lipase 234                           7.3    4.27  )-----------( 104      ( 27 Apr 2022 06:33 )             22.2       CBC:  04-27 @ 06:33  WBC 4.27   Hgb 7.3   Hct 22.2   Plts 104  MCV 89.9  04-26 @ 06:58  WBC 5.38   Hgb 9.4   Hct 28.2   Plts 114  MCV 89.5  04-25 @ 23:07  WBC 6.44   Hgb 9.9   Hct 28.9   Plts 118  MCV 88.4  04-25 @ 10:28  WBC 3.79   Hgb 11.4   Hct 33.9   Plts 122  MCV 88.7  04-25 @ 02:34  WBC 5.57   Hgb 12.7   Hct 37.9   Plts 133  MCV 89.0  04-24 @ 22:29  WBC 5.25   Hgb 13.3   Hct 39.2   Plts 149  MCV 88.7      Chemistry:  04-27 @ 06:33  Na+ 143  K+ 3.8  Cl- 110  CO2 27  BUN 9  Cr 1.03     04-26 @ 06:58  Na+ 141  K+ 3.6  Cl- 108  CO2 27  BUN 15  Cr 1.06     04-24 @ 22:29  Na+ 143  K+ 3.7  Cl- 108  CO2 27  BUN 15  Cr 1.05         Glucose, Serum: 100 mg/dL (04-27 @ 06:33)  Glucose, Serum: 93 mg/dL (04-26 @ 06:58)  Glucose, Serum: 112 mg/dL (04-24 @ 22:29)      27 Apr 2022 06:33    143    |  110    |  9      ----------------------------<  100    3.8     |  27     |  1.03   26 Apr 2022 06:58    141    |  108    |  15     ----------------------------<  93     3.6     |  27     |  1.06   24 Apr 2022 22:29    143    |  108    |  15     ----------------------------<  112    3.7     |  27     |  1.05     Ca    7.7        27 Apr 2022 06:33  Ca    8.3        26 Apr 2022 06:58  Ca    8.7        24 Apr 2022 22:29  Mg     2.0       26 Apr 2022 06:58    TPro  6.4    /  Alb  3.4    /  TBili  0.7    /  DBili  x      /  AST  22     /  ALT  21     /  AlkPhos  55     24 Apr 2022 22:29              CAPILLARY BLOOD GLUCOSE              RADIOLOGY & ADDITIONAL TESTS:    Imaging Personally Reviewed:  [ ] YES  [ ] NO    Consultant(s) Notes Reviewed:  [ ] YES  [ ] NO    Care Discussed with Consultants/Other Providers [ ] YES  [ ] NO Patient is a 77y old  Male who presents with a chief complaint of GI bleed (26 Apr 2022 11:44)      HPI:  Patient is a 77M with a PMH of diverticulitis, CAD, HLD who presents to the ED for rectal bleeding.  Patient states that from this morning he has had three episodes of maroon colored stools.  Denies history of weakness, dizziness, chest pain, palpitations, or dyspnea.  Had a colonscopy 5+ years ago when he was diagnosed with diverticulosis.  Admits to chronic constipation for the last 3 years.  Otherwise, patient complains of an inguinal hernia that he feels is worsening.  States that the hernia is often painful and becomes difficult to reduce.  No other complaints. Vitals stable, labs benign.  Will admit to med surg.     (25 Apr 2022 03:24)      INTERVAL HPI/OVERNIGHT EVENTS:  Patient had one bloody BM today. No N/V or abdominal pain. Tolerating clear liquids.    MEDICATIONS  (STANDING):  AQUAPHOR (petrolatum Ointment) 1 Application(s) Topical two times a day  atorvastatin 40 milliGRAM(s) Oral at bedtime  lactated ringers. 1000 milliLiter(s) (80 mL/Hr) IV Continuous <Continuous>  pantoprazole  Injectable 40 milliGRAM(s) IV Push two times a day    MEDICATIONS  (PRN):      FAMILY HISTORY:  FH: HTN (hypertension)        Allergies    sulfa drugs (Unknown)    Intolerances        PMH/PSH:  Hypertension    Diverticulosis    Hernia, inguinal, right          REVIEW OF SYSTEMS:  CONSTITUTIONAL: No fever, weight loss,   EYES: No eye pain, visual disturbances, or discharge  ENMT:  No difficulty hearing, tinnitus, vertigo; No sinus or throat pain  NECK: No pain or stiffness  BREASTS: No pain, masses, or nipple discharge  RESPIRATORY: No cough, wheezing, chills or hemoptysis; No shortness of breath  CARDIOVASCULAR: No chest pain, palpitations, dizziness, or leg swelling  GASTROINTESTINAL: See above   GENITOURINARY: No dysuria, frequency, hematuria, or incontinence  NEUROLOGICAL: No headaches, memory loss, numbness, or tremors  SKIN: No itching, burning, rashes, or lesions   LYMPH NODES: No enlarged glands  ENDOCRINE: No heat or cold intolerance; No hair loss  MUSCULOSKELETAL: No joint pain or swelling; No muscle, back, or extremity pain  PSYCHIATRIC: No depression, anxiety, mood swings, or difficulty sleeping  HEME/LYMPH: No easy bruising, or bleeding gums  ALLERGY AND IMMUNOLOGIC: No hives or eczema    Vital Signs Last 24 Hrs  T(C): 36.9 (27 Apr 2022 11:30), Max: 36.9 (27 Apr 2022 11:30)  T(F): 98.4 (27 Apr 2022 11:30), Max: 98.4 (27 Apr 2022 11:30)  HR: 75 (27 Apr 2022 11:30) (69 - 83)  BP: 128/73 (27 Apr 2022 11:30) (103/62 - 138/74)  BP(mean): --  RR: 18 (27 Apr 2022 11:30) (18 - 18)  SpO2: 98% (27 Apr 2022 11:30) (98% - 100%)    PHYSICAL EXAM:  GENERAL: NAD, well-groomed, well-developed  HEAD:  Atraumatic, Normocephalic  EYES: EOMI, PERRLA, conjunctiva and sclera clear  NECK: Supple, No JVD, Normal thyroid  NERVOUS SYSTEM:  Alert & Oriented X3, Good concentration; Motor Strength 5/5 B/L upper and lower extremities;   CHEST/LUNG: Clear to percussion bilaterally; No rales, rhonchi, wheezing, or rubs  HEART: Regular rate and rhythm; No murmurs, rubs, or gallops  ABDOMEN: Soft, Nontender, Nondistended; Bowel sounds present  EXTREMITIES:  2+ Peripheral Pulses, No clubbing, cyanosis, or edema  LYMPH: No lymphadenopathy noted  SKIN: No rashes or lesions    LAB  04-24 @ 22:29  amylase --   lipase 234                           7.3    4.27  )-----------( 104      ( 27 Apr 2022 06:33 )             22.2       CBC:  04-27 @ 06:33  WBC 4.27   Hgb 7.3   Hct 22.2   Plts 104  MCV 89.9  04-26 @ 06:58  WBC 5.38   Hgb 9.4   Hct 28.2   Plts 114  MCV 89.5  04-25 @ 23:07  WBC 6.44   Hgb 9.9   Hct 28.9   Plts 118  MCV 88.4  04-25 @ 10:28  WBC 3.79   Hgb 11.4   Hct 33.9   Plts 122  MCV 88.7  04-25 @ 02:34  WBC 5.57   Hgb 12.7   Hct 37.9   Plts 133  MCV 89.0  04-24 @ 22:29  WBC 5.25   Hgb 13.3   Hct 39.2   Plts 149  MCV 88.7      Chemistry:  04-27 @ 06:33  Na+ 143  K+ 3.8  Cl- 110  CO2 27  BUN 9  Cr 1.03     04-26 @ 06:58  Na+ 141  K+ 3.6  Cl- 108  CO2 27  BUN 15  Cr 1.06     04-24 @ 22:29  Na+ 143  K+ 3.7  Cl- 108  CO2 27  BUN 15  Cr 1.05         Glucose, Serum: 100 mg/dL (04-27 @ 06:33)  Glucose, Serum: 93 mg/dL (04-26 @ 06:58)  Glucose, Serum: 112 mg/dL (04-24 @ 22:29)      27 Apr 2022 06:33    143    |  110    |  9      ----------------------------<  100    3.8     |  27     |  1.03   26 Apr 2022 06:58    141    |  108    |  15     ----------------------------<  93     3.6     |  27     |  1.06   24 Apr 2022 22:29    143    |  108    |  15     ----------------------------<  112    3.7     |  27     |  1.05     Ca    7.7        27 Apr 2022 06:33  Ca    8.3        26 Apr 2022 06:58  Ca    8.7        24 Apr 2022 22:29  Mg     2.0       26 Apr 2022 06:58    TPro  6.4    /  Alb  3.4    /  TBili  0.7    /  DBili  x      /  AST  22     /  ALT  21     /  AlkPhos  55     24 Apr 2022 22:29              CAPILLARY BLOOD GLUCOSE              RADIOLOGY & ADDITIONAL TESTS:    < from: Xray Kidney Ureter Bladder (04.26.22 @ 11:46) >    ACC: [id number] EXAM:  XR KUB 1 VIEW                          PROCEDURE DATE:  04/26/2022          INTERPRETATION:  Portable abdominal study. 2 images. Patient has vomiting.    No signs of bowel obstruction. Lower sacroiliac degeneration left greater  than right noted. No signs of free air.    IMPRESSION: As above.    --- End of Report ---            TRACY PIERSON MD; Attending Radiologist  This document has been electronically signed. Apr 26 2022  2:08PM    < end of copied text >    Imaging Personally Reviewed:  [ ] YES  [ ] NO    Consultant(s) Notes Reviewed:  [ ] YES  [ ] NO    Care Discussed with Consultants/Other Providers [ ] YES  [ ] NO

## 2022-04-27 NOTE — CONSULT NOTE ADULT - SUBJECTIVE AND OBJECTIVE BOX
HPI:  Patient is a 77M with a PMH of diverticulitis, CAD, HLD who presents to the ED for rectal bleeding.  Patient states that from this morning he has had three episodes of maroon colored stools.  Denies history of weakness, dizziness, chest pain, palpitations, or dyspnea.  Had a colonscopy 5+ years ago when he was diagnosed with diverticulosis.  Admits to chronic constipation for the last 3 years.  Otherwise, patient complains of an inguinal hernia that he feels is worsening.  States that the hernia is often painful and becomes difficult to reduce.  No other complaints. Vitals stable, labs benign.  Will admit to med surg.     (2022 03:24)  ------ As Above ---------  The patient presented with 3 bouts of maroon BMs. Painless (+). No N/V. Patient is on ASA and Plavix (+) ( Cardiac stents x 1 year ). CT angio (+) proximal transverse bleed.  Patient had a similar episode 6 years ago and had a w/u which revealed diverticulosis ( by colonoscopy )   Patient had a one smaller maroon BM earlier today.   See labs / CT angio      PAST MEDICAL & SURGICAL HISTORY:  Hypertension    Diverticulosis    Hernia, inguinal, right  1990        MEDICATIONS  (STANDING):  AQUAPHOR (petrolatum Ointment) 1 Application(s) Topical two times a day  atorvastatin 40 milliGRAM(s) Oral at bedtime  lactated ringers. 1000 milliLiter(s) (80 mL/Hr) IV Continuous <Continuous>  lisinopril 20 milliGRAM(s) Oral daily  metoprolol tartrate 25 milliGRAM(s) Oral two times a day  pantoprazole  Injectable 40 milliGRAM(s) IV Push two times a day    MEDICATIONS  (PRN):      Allergies    sulfa drugs (Unknown)    Intolerances        FAMILY HISTORY:  FH: HTN (hypertension)        REVIEW OF SYSTEMS:    CONSTITUTIONAL: No fever, weight loss,   EYES: No eye pain, visual disturbances, or discharge  ENMT:  No difficulty hearing, tinnitus, vertigo; No sinus or throat pain  NECK: No pain or stiffness  BREASTS: No pain, masses, or nipple discharge  RESPIRATORY: No cough, wheezing, chills or hemoptysis; No shortness of breath  CARDIOVASCULAR: No chest pain, palpitations, dizziness, or leg swelling  GASTROINTESTINAL: See above   GENITOURINARY: No dysuria, frequency, hematuria, or incontinence  NEUROLOGICAL: No headaches, memory loss, loss of strength, numbness, or tremors  SKIN: No itching, burning, rashes, or lesions   LYMPH NODES: No enlarged glands  ENDOCRINE: No heat or cold intolerance; No hair loss  MUSCULOSKELETAL: No joint pain or swelling; No muscle, back, or extremity pain  PSYCHIATRIC: No depression, anxiety, mood swings, or difficulty sleeping  HEME/LYMPH: No easy bruising, or bleeding gums  ALLERGY AND IMMUNOLOGIC: No hives or eczema          SOCIAL HISTORY:    FAMILY HISTORY:  FH: HTN (hypertension)        Vital Signs Last 24 Hrs  T(C): 36.6 (2022 11:17), Max: 37 (2022 02:55)  T(F): 97.8 (2022 11:17), Max: 98.6 (2022 02:55)  HR: 65 (2022 11:17) (58 - 65)  BP: 119/75 (2022 13:01) (99/64 - 152/76)  BP(mean): 101 (2022 02:55) (101 - 101)  RR: 17 (2022 11:17) (11 - 19)  SpO2: 97% (2022 11:17) (97% - 99%)    PHYSICAL EXAM:    GENERAL: NAD, well-groomed, well-developed  HEAD:  Atraumatic, Normocephalic  EYES: EOMI, PERRLA, conjunctiva and sclera clear  ENMT: No tonsillar erythema, exudates, or enlargement; Moist mucous membranes, Good dentition, No lesions  NECK: Supple, No JVD, Normal thyroid  NERVOUS SYSTEM:  Alert & Oriented X3, Good concentration; Motor Strength 5/5 B/L upper and lower extremities;   CHEST/LUNG: Clear to percussion bilaterally; No rales, rhonchi, wheezing, or rubs  HEART: Regular rate and rhythm; No murmurs, rubs, or gallops  ABDOMEN: Soft, Nontender, Nondistended; Bowel sounds present  EXTREMITIES:  2+ Peripheral Pulses, No clubbing, cyanosis, or edema  LYMPH: No lymphadenopathy noted   RECTAL: No masses, small amount of dark, maroon stool   SKIN: No rashes or lesions    LABS:                        11.4   3.79  )-----------( 122      ( 2022 10:28 )             33.9       CBC:   @ 10:28  WBC  3.79  HGB 11.4  HCT 33.9 Plate 122  MCV 88.7   @ 02:34  WBC  5.57  HGB 12.7  HCT 37.9 Plate 133  MCV 89.0   @ 22:29  WBC  5.25  HGB 13.3  HCT 39.2 Plate 149  MCV 88.7           2022 22:29    143    |  108    |  15     ----------------------------<  112    3.7     |  27     |  1.05     Ca    8.7        2022 22:29    TPro  6.4    /  Alb  3.4    /  TBili  0.7    /  DBili  x      /  AST  22     /  ALT  21     /  AlkPhos  55     2022 22:29    PT/INR - ( 2022 22:29 )   PT: 12.2 sec;   INR: 1.02 ratio         PTT - ( 2022 22:29 )  PTT:27.6 sec  Urinalysis Basic - ( 2022 22:29 )    Color: Yellow / Appearance: Clear / S.010 / pH: x  Gluc: x / Ketone: Negative  / Bili: Negative / Urobili: Negative mg/dL   Blood: x / Protein: Negative mg/dL / Nitrite: Negative   Leuk Esterase: Negative / RBC: x / WBC x   Sq Epi: x / Non Sq Epi: x / Bacteria: x          RADIOLOGY & ADDITIONAL STUDIES:  < from: CT Angio Abdomen and Pelvis w/ IV Cont (22 @ 00:34) >    ACC: [id number] EXAM:  CT ANGIO ABD PELV (W)AW IC                          PROCEDURE DATE:  2022          INTERPRETATION:  CLINICAL INFORMATION: Maroon colored stool. Rectal   bleeding.    COMPARISON: CT of the abdomen and pelvis from 2020.MRI of the   abdomen from 2014.    CONTRAST/COMPLICATIONS:  IV Contrast: Omnipaque 350  95 cc administered   5 cc discarded  Oral Contrast: NONE  Complications: None reported at time of study completion    PROCEDURE:  CT of the Abdomen and Pelvis was performed.  Precontrast, Arterial and Delayed phases were performed.  Sagittal and coronal reformats were performed.    FINDINGS:  LOWER CHEST: Lung bases are clear. Heavy coronary artery calcifications.    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  BILE DUCTS: Normal caliber.  GALLBLADDER: Within normal limits.  SPLEEN: Within normal limits.  PANCREAS: Within normal limits.  ADRENALS: Redemonstration of an indeterminate 1.4 cm right adrenal   nodule. Left adrenal gland within normal limits.  KIDNEYS/URETERS: Right renal cysts. Left renal subcentimeter   low-attenuation lesions which are too small for accurate characterization.    BLADDER: Within normal limits.  REPRODUCTIVE ORGANS: Prostate within normal limits for size.    BOWEL: Intraluminal extravasation of intravenous contrast in the proximal   transverse colon compatible with an active gastrointestinal hemorrhage.   Small hiatal hernia. No bowel obstruction or inflammation. Colonic   diverticulosis without diverticulitis. Appendix is normal.  PERITONEUM: No ascites.  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  RETROPERITONEUM/LYMPH NODES: No lymphadenopathy.  ABDOMINAL WALL: Small right inguinal hernia containing fat and part of   the urinary bladder. Small left inguinal hernia containing fat and a   lymph node.  BONES: Mild degenerative changes of the spine. Redemonstration of   sclerosis at the iliac aspect of the left sacroiliac joint compatible   with sacroiliitis, not significantly changed.    IMPRESSION:  Active gastrointestinal hemorrhage in the proximal transverse colon.    Findings were discussed with Dr. JACKSON 2022 1:01 AM by Dr. LEWIS with   read back confirmation.    --- End of Report ---            ANITA LEWIS MD; Attending Radiologist  This document has been electronically signed. 2022  1:15AM    < end of copied text >  
CARDIOLOGY CONSULT NOTE    Patient is a 77y Male with a known history of :  GIB (gastrointestinal bleeding) [K92.2]    Essential hypertension [I10]    Hyperlipidemia [E78.5]    Abdominal hernia [K46.9]    CAD (coronary artery disease) [I25.10]    Preoperative clearance [Z01.818]      HPI:  77M with a PMH of CAD s/p PCI ~1yr ago (on asa/plavix at home but being held since admit in the setting of LGIB), diverticulitis, HLD who presents to the ED for rectal bleeding; continues to bleed this morning with 39 -> 22 within 3 days.      Denies history of chest pain, palpitations, or dyspnea.    Had a colonscopy 5+ years ago when he was diagnosed with diverticulosis.    EKG: sinus 70 with no ischemic changes  Echo: LVEF 55% with no WMA and no significant valvular lesions    Awaiting colonoscopy.        REVIEW OF SYSTEMS:    CONSTITUTIONAL: No fever, weight loss, or fatigue  EYES: No eye pain, visual disturbances, or discharge  ENMT:  No difficulty hearing, tinnitus, vertigo; No sinus or throat pain  NECK: No pain or stiffness  BREASTS: No pain, masses, or nipple discharge  RESPIRATORY: No cough, wheezing, chills or hemoptysis; No shortness of breath  CARDIOVASCULAR: No chest pain, palpitations, dizziness, or leg swelling  GASTROINTESTINAL: No abdominal or epigastric pain. +BRBPR  GENITOURINARY: No dysuria, frequency, hematuria, or incontinence  NEUROLOGICAL: No headaches, memory loss, loss of strength, numbness, or tremors  SKIN: No itching, burning, rashes, or lesions   LYMPH NODES: No enlarged glands  ENDOCRINE: No heat or cold intolerance; No hair loss  MUSCULOSKELETAL: No joint pain or swelling; No muscle, back, or extremity pain  PSYCHIATRIC: No depression, anxiety, mood swings, or difficulty sleeping  HEME/LYMPH: No easy bruising, or bleeding gums  ALLERGY AND IMMUNOLOGIC: No hives or eczema    MEDICATIONS  (STANDING):  AQUAPHOR (petrolatum Ointment) 1 Application(s) Topical two times a day  atorvastatin 40 milliGRAM(s) Oral at bedtime  lactated ringers. 1000 milliLiter(s) (80 mL/Hr) IV Continuous <Continuous>  pantoprazole  Injectable 40 milliGRAM(s) IV Push two times a day  polyethylene glycol/electrolyte Solution 1000 milliLiter(s) Oral once  polyethylene glycol/electrolyte Solution 1000 milliLiter(s) Oral once    MEDICATIONS  (PRN):      ALLERGIES: sulfa drugs (Unknown)      FAMILY HISTORY:  FH: HTN (hypertension)    PHYSICAL EXAMINATION:  -----------------------------  T(C): 36.9 (04-27-22 @ 11:30), Max: 36.9 (04-27-22 @ 11:30)  HR: 75 (04-27-22 @ 11:30) (69 - 83)  BP: 128/73 (04-27-22 @ 11:30) (103/62 - 138/74)  RR: 18 (04-27-22 @ 11:30) (18 - 18)  SpO2: 98% (04-27-22 @ 11:30) (98% - 100%)    Constitutional: well developed, normal appearance, well groomed, well nourished, no deformities and no acute distress.   Eyes: the conjunctiva exhibited no abnormalities and the eyelids demonstrated no xanthelasmas.   HEENT: normal oral mucosa, no oral pallor and no oral cyanosis.   Neck: normal jugular venous A waves present, normal jugular venous V waves present and no jugular venous whitehead A waves.   Pulmonary: no respiratory distress, normal respiratory rhythm and effort, no accessory muscle use and lungs were clear to auscultation bilaterally.   Cardiovascular: heart rate and rhythm were normal, normal S1 and S2 and no murmur, gallop, rub, heave or thrill are present.   Abdomen: soft, non-tender, no hepato-splenomegaly and no abdominal mass palpated.   Musculoskeletal: the gait could not be assessed..   Extremities: no clubbing of the fingernails, no localized cyanosis, no petechial hemorrhages and no ischemic changes.   Skin: normal skin color and pigmentation, no rash, no venous stasis, no skin lesions, no skin ulcer and no xanthoma was observed.   Psychiatric: oriented to person, place, and time, the affect was normal, the mood was normal and not feeling anxious.     LABS:   --------  04-27    143  |  110<H>  |  9   ----------------------------<  100<H>  3.8   |  27  |  1.03    Ca    7.7<L>      27 Apr 2022 06:33  Mg     2.0     04-26                           7.3    4.27  )-----------( 104      ( 27 Apr 2022 06:33 )             22.2           RADIOLOGY:  -----------------    e< from: TTE Echo Complete w/o Contrast w/ Doppler (04.25.22 @ 17:11) >  Summary:   1. Left ventricular ejection fraction, by visual estimation, is 55 to   60%.   2. Normal global left ventricular systolic function.   3. Mild mitral valve regurgitation.   4. Mild tricuspid regurgitation.   5. Mild aortic regurgitation.   6. Sclerotic aortic valve with normal opening.   7. Mild pulmonic valve regurgitation.   8. Estimated pulmonary artery systolic pressure is 35.7 mmHg assuming a   right atrial pressure of 5 mmHg, which is consistent with mild pulmonary   hypertension.      [id number] Bernabe Dnoald MD, [facility]  Electronically signed on 4/25/2022 at 9:05:13 PM    < end of copied text >

## 2022-04-27 NOTE — PROGRESS NOTE ADULT - ASSESSMENT
HPI:  Patient is a 77M with a PMH of diverticulitis, CAD, HLD who presents to the ED for rectal bleeding.  Patient states that from this morning he has had three episodes of maroon colored stools.  Denies history of weakness, dizziness, chest pain, palpitations, or dyspnea.  Had a colonscopy 5+ years ago when he was diagnosed with diverticulosis.  Admits to chronic constipation for the last 3 years.  Otherwise, patient complains of an inguinal hernia that he feels is worsening.  States that the hernia is often painful and becomes difficult to reduce.  No other complaints. Vitals stable, labs benign.  Will admit to med surg.     (25 Apr 2022 03:24)  ------ As Above ---------  The patient presented with 3 bouts of maroon BMs. Painless (+). No N/V. Patient is on ASA and Plavix (+) ( Cardiac stents x 1 year ). CT angio (+) proximal transverse bleed.  Patient had a similar episode 6 years ago and had a w/u which revealed diverticulosis ( by colonoscopy )   Patient had a one smaller maroon BM earlier today.   See labs / CT angio    A) Lower GI Bleed,  proximal transverse by CT angio - Patient was on Plavix / ASA,  1) Stop anti coagulants 2) EGD / Colonoscopy Thursday ( will need medical clearance )  3) F/U labs 4) Platelet transfusion. 5) Telemetry monitoring 6) Clear liquid diet  B) Vomiting x 1 after drinking Clear ensure R/O Obstruction, ileus food sensitivity  1) KUB 2) Do not advance diet til KUB checked 3) No Clear ensure 4) check electrolytes HPI:  Patient is a 77M with a PMH of diverticulitis, CAD, HLD who presents to the ED for rectal bleeding.  Patient states that from this morning he has had three episodes of maroon colored stools.  Denies history of weakness, dizziness, chest pain, palpitations, or dyspnea.  Had a colonscopy 5+ years ago when he was diagnosed with diverticulosis.  Admits to chronic constipation for the last 3 years.  Otherwise, patient complains of an inguinal hernia that he feels is worsening.  States that the hernia is often painful and becomes difficult to reduce.  No other complaints. Vitals stable, labs benign.  Will admit to med surg.     (25 Apr 2022 03:24)  ------ As Above ---------  The patient presented with 3 bouts of maroon BMs. Painless (+). No N/V. Patient is on ASA and Plavix (+) ( Cardiac stents x 1 year ). CT angio (+) proximal transverse bleed.  Patient had a similar episode 6 years ago and had a w/u which revealed diverticulosis ( by colonoscopy )   Patient had a one smaller maroon BM earlier today.   See labs / CT angio    A) Probable Lower GI Bleed,  proximal transverse by CT angio - Patient was on Plavix / ASA,  1) Continue to stop anti coagulants 2) EGD / Colonoscopy Thursday ( will need medical / cardiac clearance )  3) F/U labs 4) Platelet transfusion. 5) Blood transfusion 6) Telemetry monitoring 7) Clear liquid diet  B) Vomiting x 1 after drinking Clear ensure R/O Obstruction, ileus food sensitivity  KUB - Essentially negative

## 2022-04-27 NOTE — PROGRESS NOTE ADULT - PROBLEM SELECTOR PLAN 4
Reducible, not incarcerated, no pain.  Will get outpatient evaluation.  Also patient has right sided hard lymph node of the neck for which he needs outpatient evaluation and patient eas informed.

## 2022-04-27 NOTE — PROGRESS NOTE ADULT - SUBJECTIVE AND OBJECTIVE BOX
Patient is a 77y old  Male who presents with a chief complaint of GI bleed (27 Apr 2022 13:02)    Patient seen and examined at bedside. No acute overnight events. Case discussed with nurse at bedside.   Patient states that this am he had a BM and noted bright red blood in the toilet, denies any abdominal pain, sob, chest pain or dizziness.  SUBJECTIVE & OBJECTIVE: Pt seen and examined at bedside.   PHYSICAL EXAM:  Vital Signs Last 24 Hrs  T(C): 36.9 (27 Apr 2022 11:30), Max: 36.9 (27 Apr 2022 11:30)  T(F): 98.4 (27 Apr 2022 11:30), Max: 98.4 (27 Apr 2022 11:30)  HR: 75 (27 Apr 2022 11:30) (69 - 83)  BP: 128/73 (27 Apr 2022 11:30) (103/62 - 138/74)  BP(mean): --  RR: 18 (27 Apr 2022 11:30) (18 - 18)  SpO2: 98% (27 Apr 2022 11:30) (98% - 100%)   Daily     Daily I&O's Detail    26 Apr 2022 07:01  -  27 Apr 2022 07:00  --------------------------------------------------------  IN:  Total IN: 0 mL    OUT:    Voided (mL): 600 mL  Total OUT: 600 mL    Total NET: -600 mL    Patient is calm and comfortable, alert and oriented, pleasant and corportative.  Patient is able speak fluently and effortless.     GENERAL: NAD, well-groomed, well-developed  HEAD:  Atraumatic, Normocephalic  EYES: EOMI, PERRLA, conjunctiva and sclera clear  ENMT: Moist mucous membranes, right lymph node palpable, mildly tender.  NECK: Supple, No JVD  NERVOUS SYSTEM:  Alert & Oriented X3, Motor Strength 5/5 B/L upper and lower extremities; DTRs 2+ intact and symmetric  CHEST/LUNG: Clear to auscultation bilaterally; No rales, rhonchi, wheezing, or rubs  HEART: Regular rate and rhythm; No murmurs, rubs, or gallops  ABDOMEN: Soft, Nontender, Nondistended; Bowel sounds present  EXTREMITIES:  2+ Peripheral Pulses, No clubbing, cyanosis, or edema  LABS:                        7.3    4.27  )-----------( 104      ( 27 Apr 2022 06:33 )             22.2       Culture - Urine (collected 25 Apr 2022 10:35)  Source: Clean Catch Clean Catch (Midstream)  Final Report (26 Apr 2022 09:09):    <10,000 CFU/mL Normal Urogenital Monique      MEDICATIONS  (STANDING):  AQUAPHOR (petrolatum Ointment) 1 Application(s) Topical two times a day  atorvastatin 40 milliGRAM(s) Oral at bedtime  lactated ringers. 1000 milliLiter(s) (80 mL/Hr) IV Continuous <Continuous>  pantoprazole  Injectable 40 milliGRAM(s) IV Push two times a day  polyethylene glycol/electrolyte Solution 1000 milliLiter(s) Oral once  polyethylene glycol/electrolyte Solution 1000 milliLiter(s) Oral once    MEDICATIONS  (PRN):

## 2022-04-27 NOTE — PROGRESS NOTE ADULT - ASSESSMENT
76 yo man with h/o diverticulosis presents now with painless acute blood loss per rectum.  Patient has a h/o CAD and GI is asking for medical and cardiac clearance before colonoscopy which is scheduled for tomorrow Thursday.

## 2022-04-27 NOTE — CONSULT NOTE ADULT - ASSESSMENT
HPI:  Patient is a 77M with a PMH of diverticulitis, CAD, HLD who presents to the ED for rectal bleeding.  Patient states that from this morning he has had three episodes of maroon colored stools.  Denies history of weakness, dizziness, chest pain, palpitations, or dyspnea.  Had a colonscopy 5+ years ago when he was diagnosed with diverticulosis.  Admits to chronic constipation for the last 3 years.  Otherwise, patient complains of an inguinal hernia that he feels is worsening.  States that the hernia is often painful and becomes difficult to reduce.  No other complaints. Vitals stable, labs benign.  Will admit to med surg.     (25 Apr 2022 03:24)  ------ As Above ---------  The patient presented with 3 bouts of maroon BMs. Painless (+). No N/V. Patient is on ASA and Plavix (+) ( Cardiac stents x 1 year ). CT angio (+) proximal transverse bleed.  Patient had a similar episode 6 years ago and had a w/u which revealed diverticulosis ( by colonoscopy )   Patient had a one smaller maroon BM earlier today.   See labs / CT angio    Lower GI Bleed,  proximal transverse - Patient was on Plavix / ASA,  1) Stop anti coagulants 2) Colonoscopy Thursday ( will need medical clearance )  3) F/U labs 4) Platelets if patient continues to bleed. 5) Telemetry monitoring 6) Clear liquid diet
77M with a PMH of CAD s/p PCI ~1yr ago (on asa/plavix at home but being held since admit in the setting of LGIB), diverticulitis, HLD who presents to the ED for rectal bleeding; continues to bleed this morning with 39 -> 22 within 3 days.      Denies history of chest pain, palpitations, or dyspnea.    Had a colonscopy 5+ years ago when he was diagnosed with diverticulosis.    EKG: sinus 70 with no ischemic changes  Echo: LVEF 55% with no WMA and no significant valvular lesions    Awaiting colonoscopy.  Pt asymptomatic from a cardiac standpoint with an unremarkable EKG and echo; PCI ~1yr ago,  OK to hold antiplatelets at this time; can restart asa when bleeding has subsided and HCT stable.  Transfuse with aim to keep Hg>10  Can cont atorvastatin  At intermediate cardiovascular risk for a low risk procedure.
No

## 2022-04-27 NOTE — CHART NOTE - NSCHARTNOTEFT_GEN_A_CORE
House- Medicine NP:    Asked by Dr. Gómez to obtain consent for prbc and platelet blood transfusion per GI.   Patient is a 77y old  Male who presents with a chief complaint of GI bleed (27 Apr 2022 13:02)                          7.3    4.27  )-----------( 104      ( 27 Apr 2022 06:33 )             22.2     Discussed with patient at bedside regarding the need for blood transfusion. Risk and benefits discussed. Risks including fever, chills/rigors, high or low blood pressure, respiratory distress (wheezing/hypoxia), urticaria/rash/edema, nausea, pain, bleeding, darkened urine, lower back pain, severe allergic reaction and death was discussed. Verbalizes the understanding and consent obtained. Witnessed by Rae AUSTIN.

## 2022-04-28 LAB
ALBUMIN SERPL ELPH-MCNC: 2.8 G/DL — LOW (ref 3.3–5)
ALP SERPL-CCNC: 45 U/L — SIGNIFICANT CHANGE UP (ref 40–120)
ALT FLD-CCNC: 24 U/L — SIGNIFICANT CHANGE UP (ref 12–78)
ANION GAP SERPL CALC-SCNC: 5 MMOL/L — SIGNIFICANT CHANGE UP (ref 5–17)
AST SERPL-CCNC: 37 U/L — SIGNIFICANT CHANGE UP (ref 15–37)
BASOPHILS # BLD AUTO: 0.02 K/UL — SIGNIFICANT CHANGE UP (ref 0–0.2)
BASOPHILS NFR BLD AUTO: 0.4 % — SIGNIFICANT CHANGE UP (ref 0–2)
BILIRUB SERPL-MCNC: 1 MG/DL — SIGNIFICANT CHANGE UP (ref 0.2–1.2)
BUN SERPL-MCNC: 7 MG/DL — SIGNIFICANT CHANGE UP (ref 7–23)
CALCIUM SERPL-MCNC: 7.9 MG/DL — LOW (ref 8.5–10.1)
CHLORIDE SERPL-SCNC: 114 MMOL/L — HIGH (ref 96–108)
CO2 SERPL-SCNC: 26 MMOL/L — SIGNIFICANT CHANGE UP (ref 22–31)
CREAT SERPL-MCNC: 0.97 MG/DL — SIGNIFICANT CHANGE UP (ref 0.5–1.3)
EGFR: 79 ML/MIN/1.73M2 — SIGNIFICANT CHANGE UP
EOSINOPHIL # BLD AUTO: 0.26 K/UL — SIGNIFICANT CHANGE UP (ref 0–0.5)
EOSINOPHIL NFR BLD AUTO: 5.3 % — SIGNIFICANT CHANGE UP (ref 0–6)
FLUAV AG NPH QL: SIGNIFICANT CHANGE UP
FLUBV AG NPH QL: SIGNIFICANT CHANGE UP
GLUCOSE SERPL-MCNC: 119 MG/DL — HIGH (ref 70–99)
HCT VFR BLD CALC: 24.1 % — LOW (ref 39–50)
HGB BLD-MCNC: 7.9 G/DL — LOW (ref 13–17)
IMM GRANULOCYTES NFR BLD AUTO: 0.4 % — SIGNIFICANT CHANGE UP (ref 0–1.5)
LYMPHOCYTES # BLD AUTO: 0.84 K/UL — LOW (ref 1–3.3)
LYMPHOCYTES # BLD AUTO: 17.1 % — SIGNIFICANT CHANGE UP (ref 13–44)
MCHC RBC-ENTMCNC: 30.2 PG — SIGNIFICANT CHANGE UP (ref 27–34)
MCHC RBC-ENTMCNC: 32.8 G/DL — SIGNIFICANT CHANGE UP (ref 32–36)
MCV RBC AUTO: 92 FL — SIGNIFICANT CHANGE UP (ref 80–100)
MONOCYTES # BLD AUTO: 0.36 K/UL — SIGNIFICANT CHANGE UP (ref 0–0.9)
MONOCYTES NFR BLD AUTO: 7.3 % — SIGNIFICANT CHANGE UP (ref 2–14)
NEUTROPHILS # BLD AUTO: 3.42 K/UL — SIGNIFICANT CHANGE UP (ref 1.8–7.4)
NEUTROPHILS NFR BLD AUTO: 69.5 % — SIGNIFICANT CHANGE UP (ref 43–77)
NRBC # BLD: 0 /100 WBCS — SIGNIFICANT CHANGE UP (ref 0–0)
PLATELET # BLD AUTO: 146 K/UL — LOW (ref 150–400)
POTASSIUM SERPL-MCNC: 3.6 MMOL/L — SIGNIFICANT CHANGE UP (ref 3.5–5.3)
POTASSIUM SERPL-SCNC: 3.6 MMOL/L — SIGNIFICANT CHANGE UP (ref 3.5–5.3)
PROT SERPL-MCNC: 5.1 GM/DL — LOW (ref 6–8.3)
RBC # BLD: 2.62 M/UL — LOW (ref 4.2–5.8)
RBC # FLD: 13.2 % — SIGNIFICANT CHANGE UP (ref 10.3–14.5)
SARS-COV-2 RNA SPEC QL NAA+PROBE: SIGNIFICANT CHANGE UP
SODIUM SERPL-SCNC: 145 MMOL/L — SIGNIFICANT CHANGE UP (ref 135–145)
WBC # BLD: 4.92 K/UL — SIGNIFICANT CHANGE UP (ref 3.8–10.5)
WBC # FLD AUTO: 4.92 K/UL — SIGNIFICANT CHANGE UP (ref 3.8–10.5)

## 2022-04-28 PROCEDURE — 99233 SBSQ HOSP IP/OBS HIGH 50: CPT

## 2022-04-28 PROCEDURE — 88305 TISSUE EXAM BY PATHOLOGIST: CPT | Mod: 26

## 2022-04-28 RX ORDER — SODIUM CHLORIDE 9 MG/ML
1000 INJECTION, SOLUTION INTRAVENOUS
Refills: 0 | Status: DISCONTINUED | OUTPATIENT
Start: 2022-04-28 | End: 2022-04-28

## 2022-04-28 RX ADMIN — SODIUM CHLORIDE 80 MILLILITER(S): 9 INJECTION, SOLUTION INTRAVENOUS at 07:55

## 2022-04-28 RX ADMIN — SODIUM CHLORIDE 80 MILLILITER(S): 9 INJECTION, SOLUTION INTRAVENOUS at 13:26

## 2022-04-28 RX ADMIN — SODIUM CHLORIDE 42 MILLILITER(S): 9 INJECTION, SOLUTION INTRAVENOUS at 16:26

## 2022-04-28 RX ADMIN — Medication 1 APPLICATION(S): at 17:03

## 2022-04-28 RX ADMIN — ATORVASTATIN CALCIUM 40 MILLIGRAM(S): 80 TABLET, FILM COATED ORAL at 21:15

## 2022-04-28 RX ADMIN — PANTOPRAZOLE SODIUM 40 MILLIGRAM(S): 20 TABLET, DELAYED RELEASE ORAL at 05:07

## 2022-04-28 RX ADMIN — Medication 1000 MILLILITER(S): at 03:07

## 2022-04-28 RX ADMIN — PANTOPRAZOLE SODIUM 40 MILLIGRAM(S): 20 TABLET, DELAYED RELEASE ORAL at 17:03

## 2022-04-28 RX ADMIN — Medication 1 APPLICATION(S): at 06:06

## 2022-04-28 NOTE — PROGRESS NOTE ADULT - PROBLEM SELECTOR PLAN 1
Patient with bright red blood per rectum  S/P transfusion one U PRBC and one unit Platelets.  H & H is stable.

## 2022-04-28 NOTE — PROGRESS NOTE ADULT - ASSESSMENT
78 yo man with h/o diverticulosis presents now with painless acute blood loss per rectum.  Patient has a h/o CAD has been optimized for proposed procedure colonoscopy today.

## 2022-04-28 NOTE — PROGRESS NOTE ADULT - SUBJECTIVE AND OBJECTIVE BOX
Patient is a 77y old  Male who presents with a chief complaint of rectal bleeding (28 Apr 2022 12:28)    PAST MEDICAL & SURGICAL HISTORY:  Hypertension    Diverticulosis    CAD s/p PCI    Hernia, inguinal, right  1990    INTERVAL HISTORY: denies any chest pain or sob   	  MEDICATIONS:  MEDICATIONS  (STANDING):  AQUAPHOR (petrolatum Ointment) 1 Application(s) Topical two times a day  atorvastatin 40 milliGRAM(s) Oral at bedtime  lactated ringers. 1000 milliLiter(s) (42 mL/Hr) IV Continuous <Continuous>  pantoprazole  Injectable 40 milliGRAM(s) IV Push two times a day    Vitals:  T(F): 97.7 (04-28-22 @ 14:48), Max: 98.5 (04-28-22 @ 02:30)  HR: 64 (04-28-22 @ 14:48) (63 - 86)  BP: 148/73 (04-28-22 @ 14:48) (116/63 - 148/73)  RR: 18 (04-28-22 @ 14:48) (14 - 20)  SpO2: 97% (04-28-22 @ 14:48) (97% - 100%)    04-27 @ 07:01  -  04-28 @ 07:00  --------------------------------------------------------  IN:    Lactated Ringers: 240 mL    Oral Fluid: 2000 mL    PRBCs (Packed Red Blood Cells): 203 mL  Total IN: 2443 mL    OUT:  Total OUT: 0 mL    Total NET: 2443 mL    04-28 @ 07:01  -  04-28 @ 16:58  --------------------------------------------------------  IN:  Total IN: 0 mL    OUT:    Voided (mL): 301 mL  Total OUT: 301 mL    Total NET: -301 mL    PHYSICAL EXAM:  Neuro: Awake, responsive  CV: S1 S2 RRR  Lungs: CTABL  GI: Soft, BS +, ND, NT  Extremities: No edema    TELEMETRY: sinus     RADIOLOGY: < from: Xray Kidney Ureter Bladder (04.26.22 @ 11:46) >  No signs of bowel obstruction. Lower sacroiliac degeneration left greater  than right noted. No signs of free air.    < end of copied text >    DIAGNOSTIC TESTING:    [ x] Echocardiogram: < from: TTE Echo Complete w/o Contrast w/ Doppler (04.25.22 @ 17:11) >  Left Ventricle: Normal left ventricular size and wall thicknesses, with   normal systolic and diastolic function.  Global LV systolic function was normal. Left ventricular ejection  fraction, by visual estimation, is 55 to 60%.  Right Ventricle: Normal right ventricular size and function.  Left Atrium: The left atrium is normal in size.  Right Atrium: The right atrium is normal in size.  Pericardium: There is no evidence of pericardial effusion.  Mitral Valve: Structurally normal mitral valve, with normal leaflet   excursion. Mild mitral valve regurgitation is seen.  Tricuspid Valve: Structurally normal tricuspid valve, with normal leaflet   excursion. Mild tricuspid regurgitation is visualized. Estimated   pulmonary artery systolic pressure is 35.7 mmHg assuming a right atrial   pressure of 5 mmHg, which is consistent with mild pulmonary hypertension.  Aortic Valve: Normal trileaflet aortic valve with normal opening.   Sclerotic aortic valve with normal opening. Mild aortic valve   regurgitation is seen.  Pulmonic Valve: Structurally normal pulmonic valve, with normal leaflet   excursion. Mild pulmonic valve regurgitation.  Aorta: The aortic root and ascending aorta are structurally normal, with   no evidence of dilitation.  Pulmonary Artery: The main pulmonary artery is normal in size.      Summary:   1. Left ventricular ejection fraction, by visual estimation, is 55 to   60%.   2. Normal global left ventricular systolic function.   3. Mild mitral valve regurgitation.   4. Mild tricuspid regurgitation.   5. Mild aortic regurgitation.   6. Sclerotic aortic valve with normal opening.   7. Mild pulmonic valve regurgitation.   8. Estimated pulmonary artery systolic pressure is 35.7 mmHg assuming a   right atrial pressure of 5 mmHg, which is consistent with mild pulmonary   hypertension.    < end of copied text >    LABS:	 	    28 Apr 2022 06:30    145    |  114    |  7      ----------------------------<  119    3.6     |  26     |  0.97   27 Apr 2022 06:33    143    |  110    |  9      ----------------------------<  100    3.8     |  27     |  1.03   26 Apr 2022 06:58    141    |  108    |  15     ----------------------------<  93     3.6     |  27     |  1.06     Ca    7.9        28 Apr 2022 06:30    TPro  5.1    /  Alb  2.8    /  TBili  1.0    /  DBili  x      /  AST  37     /  ALT  24     /  AlkPhos  45     28 Apr 2022 06:30                        7.9    4.92  )-----------( 146      ( 28 Apr 2022 06:30 )             24.1 ,                       7.3    4.27  )-----------( 104      ( 27 Apr 2022 06:33 )             22.2 ,                       9.4    5.38  )-----------( 114      ( 26 Apr 2022 06:58 )             28.2 ,                       9.9    6.44  )-----------( 118      ( 25 Apr 2022 23:07 )             28.9

## 2022-04-28 NOTE — PROGRESS NOTE ADULT - SUBJECTIVE AND OBJECTIVE BOX
Patient is a 77y old  Male who presents with a chief complaint of GI bleed (27 Apr 2022 15:22)    Patient seen and examined at bedside. No acute overnight events. Case discussed with nurse at bedside.   Patient states that he only had a little streak of blood per rectum this am but no other episodes of tj bleeding.  SUBJECTIVE & OBJECTIVE: Pt seen and examined at bedside.   PHYSICAL EXAM:  Vital Signs Last 24 Hrs  T(C): 36.7 (28 Apr 2022 10:17), Max: 36.9 (28 Apr 2022 02:30)  T(F): 98.1 (28 Apr 2022 10:17), Max: 98.5 (28 Apr 2022 02:30)  HR: 71 (28 Apr 2022 10:17) (69 - 98)  BP: 123/67 (28 Apr 2022 10:17) (118/64 - 147/75)  BP(mean): --  RR: 18 (28 Apr 2022 10:17) (16 - 20)  SpO2: 99% (28 Apr 2022 10:17) (97% - 100%)   Daily     Daily I&O's Detail    27 Apr 2022 07:01  -  28 Apr 2022 07:00  --------------------------------------------------------  IN:    Lactated Ringers: 240 mL    Oral Fluid: 2000 mL    PRBCs (Packed Red Blood Cells): 203 mL  Total IN: 2443 mL    OUT:  Total OUT: 0 mL    Total NET: 2443 mL      Patient is calm and comfortable, alert and oriented, pleasant and corportative.  Patient is able speak fluently and effortless.   GENERAL: NAD, well-groomed, well-developed  HEAD:  Atraumatic, Normocephalic  EYES: EOMI, PERRLA, conjunctiva and sclera clear  ENMT: Moist mucous membranes, right lymph node palpable  NECK: Supple, No JVD  NERVOUS SYSTEM:  Alert & Oriented X3, Motor Strength 5/5 B/L upper and lower extremities; DTRs 2+ intact and symmetric  CHEST/LUNG: Clear to auscultation bilaterally; No rales, rhonchi, wheezing, or rubs  HEART: Regular rate and rhythm; No murmurs, rubs, or gallops  ABDOMEN: Soft, Nontender, Nondistended; Bowel sounds present  EXTREMITIES:  2+ Peripheral Pulses, No clubbing, cyanosis, or edema  LABS:                        7.9    4.92  )-----------( 146      ( 28 Apr 2022 06:30 )             24.1     MEDICATIONS  (STANDING):  AQUAPHOR (petrolatum Ointment) 1 Application(s) Topical two times a day  atorvastatin 40 milliGRAM(s) Oral at bedtime  lactated ringers. 1000 milliLiter(s) (80 mL/Hr) IV Continuous <Continuous>  pantoprazole  Injectable 40 milliGRAM(s) IV Push two times a day

## 2022-04-28 NOTE — PROGRESS NOTE ADULT - SUBJECTIVE AND OBJECTIVE BOX
Procedure:           Colonoscopy with biopsy    Indications:         GI bleed  Monitored Anesthesia Care provided by :    ____________________________________________________________________________________________________  Procedure:           Pre-Anesthesia Assessment:                       - Prior to the procedure, a History and Physical was performed, and patient                        medications and allergies were reviewed. The patient is competent. The risks                        and benefits of the procedure and the sedation options and risks were                        discussed with the patient. All questions were answered and informed consent                        was obtained. Patient identification and proposed procedure were verified by                        the physician, the nurse and the anesthesiologist in the procedure room.                        Mental Status Examination: alert and oriented. Airway Examination: normal                        oropharyngeal airway and neck mobility. Respiratory Examination: clear to                        auscultation. CV Examination: normal. Prophylactic Antibiotics: The patient                        does not require prophylactic antibiotics.                        Grade Assessment:  After                        reviewing the risks and benefits, the patient was deemed in satisfactory                        condition to undergo the procedure. The anesthesia plan was to use monitored                        anesthesia care (MAC). Immediately prior to administration of medications,                        the patient was re-assessed for adequacy to receive sedatives. The heart                        rate, respiratory rate, oxygen saturations, blood pressure, adequacy of                        pulmonary ventilation, and response to care were monitored throughout the                        procedure. The physical status of the patient was re-assessed after the   	 	     procedure.                       After I obtained informed consent, the scope was passed under direct vision.                        Throughout the procedure, the patient's blood pressure, pulse, and oxygen                        saturations were monitored continuously. The Colonoscope was introduced                        through the anus and advanced to thececum, with identification of                        the appendiceal orifice and IC valve. The colonoscopy was performed without                        difficulty. The patient tolerated the procedure well. The quality of the                        bowel preparation was fair.         PAN DIVERTICULUM, MULTIPLE THROUGHOUT THE COLON            SMALL AMOUNT OF RED BLOOD THROUGHOUT THE COLON            OTHERWISE ;           RECTUM:    2 MM POLYP S/P BIOPSY    SIGMOID:    DESCENDING COLON:   WNL    TRANSVERSE COLON: 4 MM & 6 MM POLYP @ 60 S/P BIOPSY    ASCENDING COLON:   WNL    CECUM: WNL      The patient tolerated the procedure well.

## 2022-04-28 NOTE — BRIEF OPERATIVE NOTE - NSICDXBRIEFPOSTOP_GEN_ALL_CORE_FT
POST-OP DIAGNOSIS:  Colonic polyp 28-Apr-2022 12:32:06  Michael Pina  Gastritis 28-Apr-2022 12:32:16  Michael Pina  Colonic diverticulum 28-Apr-2022 12:32:29  Michael Pina

## 2022-04-28 NOTE — BRIEF OPERATIVE NOTE - NSICDXBRIEFPROCEDURE_GEN_ALL_CORE_FT
PROCEDURES:  Gastroscopy 28-Apr-2022 12:31:09  Michael Pina  Colonoscopy with biopsy 28-Apr-2022 12:31:42  Michael Pina

## 2022-04-28 NOTE — PROGRESS NOTE ADULT - ASSESSMENT
77M with a PMH of CAD s/p PCI 2019 (on asa/plavix at home but being held in the setting of LGIB), diverticulitis, HLD who presents to the ED for rectal bleeding; continues to bleed where Hct dropped from 39 -> 22 within 3 days.    Last cath 12/2021 with Mid LAD stent 100% occluded with collaterals.    Denies history of chest pain, palpitations, or dyspnea.    Had a colonoscopy 5+ years ago when he was diagnosed with diverticulosis.    EKG: sinus 70 with no ischemic changes  Echo: LVEF 55% with no WMA and no significant valvular lesions    s/p EGD/colonoscopy   OK to hold antiplatelets at this time;  restart when cleared by GI  Transfuse with aim to keep Hg>10 with CAD hx  Can cont atorvastatin  Pt's outpatient cardiologist is Hermann Parada

## 2022-04-29 LAB
ANION GAP SERPL CALC-SCNC: 6 MMOL/L — SIGNIFICANT CHANGE UP (ref 5–17)
BUN SERPL-MCNC: 4 MG/DL — LOW (ref 7–23)
CALCIUM SERPL-MCNC: 7.8 MG/DL — LOW (ref 8.5–10.1)
CHLORIDE SERPL-SCNC: 111 MMOL/L — HIGH (ref 96–108)
CO2 SERPL-SCNC: 28 MMOL/L — SIGNIFICANT CHANGE UP (ref 22–31)
CREAT SERPL-MCNC: 0.9 MG/DL — SIGNIFICANT CHANGE UP (ref 0.5–1.3)
EGFR: 87 ML/MIN/1.73M2 — SIGNIFICANT CHANGE UP
GLUCOSE SERPL-MCNC: 95 MG/DL — SIGNIFICANT CHANGE UP (ref 70–99)
HCT VFR BLD CALC: 25.5 % — LOW (ref 39–50)
HCT VFR BLD CALC: 34.9 % — LOW (ref 39–50)
HGB BLD-MCNC: 12.2 G/DL — LOW (ref 13–17)
HGB BLD-MCNC: 8.7 G/DL — LOW (ref 13–17)
MCHC RBC-ENTMCNC: 30.4 PG — SIGNIFICANT CHANGE UP (ref 27–34)
MCHC RBC-ENTMCNC: 30.9 PG — SIGNIFICANT CHANGE UP (ref 27–34)
MCHC RBC-ENTMCNC: 34.1 G/DL — SIGNIFICANT CHANGE UP (ref 32–36)
MCHC RBC-ENTMCNC: 35 G/DL — SIGNIFICANT CHANGE UP (ref 32–36)
MCV RBC AUTO: 88.4 FL — SIGNIFICANT CHANGE UP (ref 80–100)
MCV RBC AUTO: 89.2 FL — SIGNIFICANT CHANGE UP (ref 80–100)
NRBC # BLD: 0 /100 WBCS — SIGNIFICANT CHANGE UP (ref 0–0)
NRBC # BLD: 0 /100 WBCS — SIGNIFICANT CHANGE UP (ref 0–0)
PLATELET # BLD AUTO: 128 K/UL — LOW (ref 150–400)
PLATELET # BLD AUTO: 136 K/UL — LOW (ref 150–400)
POTASSIUM SERPL-MCNC: 3.4 MMOL/L — LOW (ref 3.5–5.3)
POTASSIUM SERPL-SCNC: 3.4 MMOL/L — LOW (ref 3.5–5.3)
RBC # BLD: 2.86 M/UL — LOW (ref 4.2–5.8)
RBC # BLD: 3.95 M/UL — LOW (ref 4.2–5.8)
RBC # FLD: 13.7 % — SIGNIFICANT CHANGE UP (ref 10.3–14.5)
RBC # FLD: 13.8 % — SIGNIFICANT CHANGE UP (ref 10.3–14.5)
SODIUM SERPL-SCNC: 145 MMOL/L — SIGNIFICANT CHANGE UP (ref 135–145)
WBC # BLD: 5.95 K/UL — SIGNIFICANT CHANGE UP (ref 3.8–10.5)
WBC # BLD: 6.92 K/UL — SIGNIFICANT CHANGE UP (ref 3.8–10.5)
WBC # FLD AUTO: 5.95 K/UL — SIGNIFICANT CHANGE UP (ref 3.8–10.5)
WBC # FLD AUTO: 6.92 K/UL — SIGNIFICANT CHANGE UP (ref 3.8–10.5)

## 2022-04-29 PROCEDURE — 99233 SBSQ HOSP IP/OBS HIGH 50: CPT

## 2022-04-29 PROCEDURE — 99232 SBSQ HOSP IP/OBS MODERATE 35: CPT | Mod: FS

## 2022-04-29 RX ORDER — POTASSIUM CHLORIDE 20 MEQ
40 PACKET (EA) ORAL ONCE
Refills: 0 | Status: COMPLETED | OUTPATIENT
Start: 2022-04-29 | End: 2022-04-29

## 2022-04-29 RX ADMIN — SODIUM CHLORIDE 42 MILLILITER(S): 9 INJECTION, SOLUTION INTRAVENOUS at 05:07

## 2022-04-29 RX ADMIN — Medication 40 MILLIEQUIVALENT(S): at 17:24

## 2022-04-29 RX ADMIN — Medication 1 APPLICATION(S): at 05:07

## 2022-04-29 RX ADMIN — ATORVASTATIN CALCIUM 40 MILLIGRAM(S): 80 TABLET, FILM COATED ORAL at 21:16

## 2022-04-29 RX ADMIN — PANTOPRAZOLE SODIUM 40 MILLIGRAM(S): 20 TABLET, DELAYED RELEASE ORAL at 06:01

## 2022-04-29 RX ADMIN — PANTOPRAZOLE SODIUM 40 MILLIGRAM(S): 20 TABLET, DELAYED RELEASE ORAL at 17:25

## 2022-04-29 RX ADMIN — Medication 1 APPLICATION(S): at 17:24

## 2022-04-29 NOTE — PROGRESS NOTE ADULT - SUBJECTIVE AND OBJECTIVE BOX
Patient is a 77y old  Male who presents with a chief complaint of GI bleed (29 Apr 2022 11:36)      HPI:  Patient is a 77M with a PMH of diverticulitis, CAD, HLD who presents to the ED for rectal bleeding.  Patient states that from this morning he has had three episodes of maroon colored stools.  Denies history of weakness, dizziness, chest pain, palpitations, or dyspnea.  Had a colonscopy 5+ years ago when he was diagnosed with diverticulosis.  Admits to chronic constipation for the last 3 years.  Otherwise, patient complains of an inguinal hernia that he feels is worsening.  States that the hernia is often painful and becomes difficult to reduce.  No other complaints. Vitals stable, labs benign.  Will admit to med surg.     (25 Apr 2022 03:24)      INTERVAL HPI/OVERNIGHT EVENTS: Patient seen earlier today  The patient denies melena, hematochezia, hematemesis, nausea, vomiting, abdominal pain, constipation, diarrhea, or change in bowel movements Tolerating clear liquid diet    MEDICATIONS  (STANDING):  AQUAPHOR (petrolatum Ointment) 1 Application(s) Topical two times a day  atorvastatin 40 milliGRAM(s) Oral at bedtime  lactated ringers. 1000 milliLiter(s) (42 mL/Hr) IV Continuous <Continuous>  pantoprazole  Injectable 40 milliGRAM(s) IV Push two times a day  potassium chloride    Tablet ER 40 milliEquivalent(s) Oral once    MEDICATIONS  (PRN):      FAMILY HISTORY:  FH: HTN (hypertension)        Allergies    sulfa drugs (Unknown)    Intolerances        PMH/PSH:  Hypertension    Diverticulosis    Hernia, inguinal, right          REVIEW OF SYSTEMS:  CONSTITUTIONAL: No fever, weight loss, or fatigue  EYES: No eye pain, visual disturbances, or discharge  ENMT:  No difficulty hearing, tinnitus, vertigo; No sinus or throat pain  NECK: No pain or stiffness  BREASTS: No pain, masses, or nipple discharge  RESPIRATORY: No cough, wheezing, chills or hemoptysis; No shortness of breath  CARDIOVASCULAR: No chest pain, palpitations, dizziness, or leg swelling  GASTROINTESTINAL: See above   GENITOURINARY: No dysuria, frequency, hematuria, or incontinence  NEUROLOGICAL: No headaches, memory loss, loss of strength, numbness, or tremors  SKIN: No itching, burning, rashes, or lesions   LYMPH NODES: No enlarged glands  ENDOCRINE: No heat or cold intolerance; No hair loss  MUSCULOSKELETAL: No joint pain or swelling; No muscle, back, or extremity pain  PSYCHIATRIC: No depression, anxiety, mood swings, or difficulty sleeping  HEME/LYMPH: No easy bruising, or bleeding gums  ALLERGY AND IMMUNOLOGIC: No hives or eczema    Vital Signs Last 24 Hrs  T(C): 36.5 (29 Apr 2022 12:36), Max: 36.8 (29 Apr 2022 12:20)  T(F): 97.7 (29 Apr 2022 12:36), Max: 98.3 (29 Apr 2022 12:20)  HR: 68 (29 Apr 2022 12:36) (61 - 68)  BP: 146/78 (29 Apr 2022 12:36) (104/61 - 149/73)  BP(mean): --  RR: 17 (29 Apr 2022 12:36) (17 - 18)  SpO2: 99% (29 Apr 2022 12:36) (97% - 100%)    PHYSICAL EXAM:  GENERAL: NAD, well-groomed, well-developed  HEAD:  Atraumatic, Normocephalic  EYES: EOMI, PERRLA, conjunctiva and sclera clear  NECK: Supple, No JVD, Normal thyroid  NERVOUS SYSTEM:  Alert & Oriented X3, Good concentration; Motor Strength 5/5 B/L upper and lower extremities;   CHEST/LUNG: Clear to percussion bilaterally; No rales, rhonchi, wheezing, or rubs  HEART: Regular rate and rhythm; No murmurs, rubs, or gallops  ABDOMEN: Soft, Nontender, Nondistended; Bowel sounds present  EXTREMITIES:  2+ Peripheral Pulses, No clubbing, cyanosis, or edema  LYMPH: No lymphadenopathy noted  SKIN: No rashes or lesions    LAB  04-24 @ 22:29  amylase --   lipase 234                           8.7    5.95  )-----------( 128      ( 29 Apr 2022 06:51 )             25.5       CBC:  04-29 @ 06:51  WBC 5.95   Hgb 8.7   Hct 25.5   Plts 128  MCV 89.2  04-28 @ 06:30  WBC 4.92   Hgb 7.9   Hct 24.1   Plts 146  MCV 92.0  04-27 @ 06:33  WBC 4.27   Hgb 7.3   Hct 22.2   Plts 104  MCV 89.9  04-26 @ 06:58  WBC 5.38   Hgb 9.4   Hct 28.2   Plts 114  MCV 89.5  04-25 @ 23:07  WBC 6.44   Hgb 9.9   Hct 28.9   Plts 118  MCV 88.4  04-25 @ 10:28  WBC 3.79   Hgb 11.4   Hct 33.9   Plts 122  MCV 88.7  04-25 @ 02:34  WBC 5.57   Hgb 12.7   Hct 37.9   Plts 133  MCV 89.0  04-24 @ 22:29  WBC 5.25   Hgb 13.3   Hct 39.2   Plts 149  MCV 88.7      Chemistry:  04-29 @ 06:51  Na+ 145  K+ 3.4  Cl- 111  CO2 28  BUN 4  Cr 0.90     04-28 @ 06:30  Na+ 145  K+ 3.6  Cl- 114  CO2 26  BUN 7  Cr 0.97     04-27 @ 06:33  Na+ 143  K+ 3.8  Cl- 110  CO2 27  BUN 9  Cr 1.03     04-26 @ 06:58  Na+ 141  K+ 3.6  Cl- 108  CO2 27  BUN 15  Cr 1.06     04-24 @ 22:29  Na+ 143  K+ 3.7  Cl- 108  CO2 27  BUN 15  Cr 1.05         Glucose, Serum: 95 mg/dL (04-29 @ 06:51)  Glucose, Serum: 119 mg/dL (04-28 @ 06:30)  Glucose, Serum: 100 mg/dL (04-27 @ 06:33)  Glucose, Serum: 93 mg/dL (04-26 @ 06:58)  Glucose, Serum: 112 mg/dL (04-24 @ 22:29)      29 Apr 2022 06:51    145    |  111    |  4      ----------------------------<  95     3.4     |  28     |  0.90   28 Apr 2022 06:30    145    |  114    |  7      ----------------------------<  119    3.6     |  26     |  0.97   27 Apr 2022 06:33    143    |  110    |  9      ----------------------------<  100    3.8     |  27     |  1.03   26 Apr 2022 06:58    141    |  108    |  15     ----------------------------<  93     3.6     |  27     |  1.06   24 Apr 2022 22:29    143    |  108    |  15     ----------------------------<  112    3.7     |  27     |  1.05     Ca    7.8        29 Apr 2022 06:51  Ca    7.9        28 Apr 2022 06:30  Ca    7.7        27 Apr 2022 06:33  Ca    8.3        26 Apr 2022 06:58  Ca    8.7        24 Apr 2022 22:29  Mg     2.0       26 Apr 2022 06:58    TPro  5.1    /  Alb  2.8    /  TBili  1.0    /  DBili  x      /  AST  37     /  ALT  24     /  AlkPhos  45     28 Apr 2022 06:30  TPro  6.4    /  Alb  3.4    /  TBili  0.7    /  DBili  x      /  AST  22     /  ALT  21     /  AlkPhos  55     24 Apr 2022 22:29              CAPILLARY BLOOD GLUCOSE              RADIOLOGY & ADDITIONAL TESTS:    Imaging Personally Reviewed:  [ ] YES  [ ] NO    Consultant(s) Notes Reviewed:  [ ] YES  [ ] NO    Care Discussed with Consultants/Other Providers [ ] YES  [ ] NO

## 2022-04-29 NOTE — PROGRESS NOTE ADULT - ASSESSMENT
HPI:  Patient is a 77M with a PMH of diverticulitis, CAD, HLD who presents to the ED for rectal bleeding.  Patient states that from this morning he has had three episodes of maroon colored stools.  Denies history of weakness, dizziness, chest pain, palpitations, or dyspnea.  Had a colonscopy 5+ years ago when he was diagnosed with diverticulosis.  Admits to chronic constipation for the last 3 years.  Otherwise, patient complains of an inguinal hernia that he feels is worsening.  States that the hernia is often painful and becomes difficult to reduce.  No other complaints. Vitals stable, labs benign.  Will admit to med surg.     (25 Apr 2022 03:24)  ------ As Above ---------  The patient presented with 3 bouts of maroon BMs. Painless (+). No N/V. Patient is on ASA and Plavix (+) ( Cardiac stents x 1 year ). CT angio (+) proximal transverse bleed.  Patient had a similar episode 6 years ago and had a w/u which revealed diverticulosis ( by colonoscopy )   Patient had a one smaller maroon BM earlier today.   See labs / CT angio    A) Probable Lower GI Bleed,  proximal transverse by CT angio - Patient was on Plavix / ASA, See EGD / Colonoscopy report  1) Continue to hold anti coagulants 2) F/U labs 3) Full  liquid diet and advance slowly  B) Vomiting x 1 after drinking Clear ensure R/O Obstruction, ileus food sensitivity  KUB - Essentially negative = RESOLVED   ===== Patient stable for discharge as long as patient tolerates solid diet w/o any signs of bleeding HPI:  Patient is a 77M with a PMH of diverticulitis, CAD, HLD who presents to the ED for rectal bleeding.  Patient states that from this morning he has had three episodes of maroon colored stools.  Denies history of weakness, dizziness, chest pain, palpitations, or dyspnea.  Had a colonscopy 5+ years ago when he was diagnosed with diverticulosis.  Admits to chronic constipation for the last 3 years.  Otherwise, patient complains of an inguinal hernia that he feels is worsening.  States that the hernia is often painful and becomes difficult to reduce.  No other complaints. Vitals stable, labs benign.  Will admit to med surg.     (25 Apr 2022 03:24)  ------ As Above ---------  The patient presented with 3 bouts of maroon BMs. Painless (+). No N/V. Patient is on ASA and Plavix (+) ( Cardiac stents x 1 year ). CT angio (+) proximal transverse bleed.  Patient had a similar episode 6 years ago and had a w/u which revealed diverticulosis ( by colonoscopy )   Patient had a one smaller maroon BM earlier today.   See labs / CT angio    A) Probable Lower GI Bleed,  proximal transverse by CT angio - Patient was on Plavix / ASA, See EGD / Colonoscopy report  1) Continue to hold anti coagulants 2) F/U labs 3) Full  liquid diet and advance slowly  B) Vomiting x 1 after drinking Clear ensure R/O Obstruction, ileus food sensitivity  KUB - Essentially negative = RESOLVED   ===== Patient stable for discharge as long as patient tolerates solid diet w/o any signs of bleeding  ===== Based on patient's course in the hospital, would restart ASA for a total of 7 days

## 2022-04-29 NOTE — PROGRESS NOTE ADULT - TIME BILLING
GI
Lab test review, Radiology Review, Vitals review, Consultant review and discussion, Physical examination, IDR, Assessment and plan; Plan discussion with patient

## 2022-04-29 NOTE — PROGRESS NOTE ADULT - SUBJECTIVE AND OBJECTIVE BOX
Patient is a 77y old  Male who presents with a chief complaint of GI bleed (2022 14:19)    Patient seen and examined at bedside. No acute overnight events. Case discussed with nurse at bedside.   Denies fevers, chills, nausea, vomiting, chest pain, sob, palpitations, abdominal pain, diarrhea, dysuria. All other ROS negative, NO bleeding per rectum.  SUBJECTIVE & OBJECTIVE: Pt seen and examined at bedside.   PHYSICAL EXAM:  Vital Signs Last 24 Hrs  T(C): 36.7 (2022 16:04), Max: 37 (2022 16:44)  T(F): 98 (2022 16:04), Max: 98.6 (2022 16:44)  HR: 76 (2022 16:04) (70 - 76)  BP: 135/82 (2022 16:04) (119/69 - 151/75)  BP(mean): 86 (2022 10:02) (86 - 86)  RR: 18 (2022 16:04) (17 - 19)  SpO2: 99% (2022 16:04) (96% - 100%)   Daily     Daily Weight in k (2022 05:17)I&O's Detail    2022 07:01  -  2022 07:00  --------------------------------------------------------  IN:    Oral Fluid: 480 mL  Total IN: 480 mL    OUT:    Voided (mL): 3 mL  Total OUT: 3 mL    Total NET: 477 mL    Patient is calm and comfortable, alert and oriented, pleasant and corportative.  Patient is able speak fluently and effortless.     GENERAL: NAD, well-groomed, well-developed  HEAD:  Atraumatic, Normocephalic  EYES: EOMI, PERRLA, conjunctiva and sclera clear  ENMT: Moist mucous membranes  NECK: Supple, No JVD  NERVOUS SYSTEM:  Alert & Oriented X3, Motor Strength 5/5 B/L upper and lower extremities; DTRs 2+ intact and symmetric  CHEST/LUNG: Clear to auscultation bilaterally; No rales, rhonchi, wheezing, or rubs  HEART: Regular rate and rhythm; No murmurs, rubs, or gallops  ABDOMEN: Soft, Nontender, Nondistended; Bowel sounds present  EXTREMITIES:  2+ Peripheral Pulses, No clubbing, cyanosis, or edema  LABS:

## 2022-04-29 NOTE — PROGRESS NOTE ADULT - ASSESSMENT
78 yo man with h/o diverticulosis presents now with painless acute blood loss per rectum.  Patient is S/P colonoscopy, biopsy of polyps, results are to be followed with GI. No true bleeding today.  Will transfuse to bring Hb to 10 due to h/o CAD.

## 2022-04-29 NOTE — PROGRESS NOTE ADULT - ASSESSMENT
77M with a PMH of CAD s/p PCI 2019 (on asa/plavix at home but being held in the setting of LGIB), diverticulitis, HLD who presents to the ED for rectal bleeding; continues to bleed where Hct dropped from 39 -> 22 within 3 days.    Last cath 12/2021 with Mid LAD stent 100% occluded with collaterals.    Denies history of chest pain, palpitations, or dyspnea.    Had a colonoscopy 5+ years ago when he was diagnosed with diverticulosis.    EKG: sinus 70 with no ischemic changes  Echo: LVEF 55% with no WMA and no significant valvular lesions    s/p EGD/colonoscopy  on 4/28/22  OK to hold antiplatelets at this time;  restart when cleared by GI  Transfuse with aim to keep Hg>10 with CAD hx  Can cont atorvastatin  Follow up with outpatient cardiologist is Hermann Parada in 2 weeks.

## 2022-04-29 NOTE — PROGRESS NOTE ADULT - SUBJECTIVE AND OBJECTIVE BOX
Patient is a 77y old  Male who presents with a chief complaint of GI bleed (28 Apr 2022 16:58)    PAST MEDICAL & SURGICAL HISTORY:  Hypertension  Diverticulosis  Hernia, inguinal, right  1990    INTERVAL HISTORY:  	  MEDICATIONS:  MEDICATIONS  (STANDING):  AQUAPHOR (petrolatum Ointment) 1 Application(s) Topical two times a day  atorvastatin 40 milliGRAM(s) Oral at bedtime  lactated ringers. 1000 milliLiter(s) (42 mL/Hr) IV Continuous <Continuous>  pantoprazole  Injectable 40 milliGRAM(s) IV Push two times a day  potassium chloride    Tablet ER 40 milliEquivalent(s) Oral once    MEDICATIONS  (PRN):      Vitals:  T(F): 97.9 (04-29-22 @ 10:15), Max: 98.1 (04-29-22 @ 00:04)  HR: 68 (04-29-22 @ 10:15) (61 - 80)  BP: 148/78 (04-29-22 @ 10:15) (104/61 - 148/78)  RR: 17 (04-29-22 @ 10:15) (14 - 18)  SpO2: 99% (04-29-22 @ 10:15) (97% - 100%)  Wt(kg): --69.2 kg    04-28 @ 07:01  -  04-29 @ 07:00  --------------------------------------------------------  IN:    Oral Fluid: 360 mL  Total IN: 360 mL    OUT:    Voided (mL): 301 mL  Total OUT: 301 mL    Total NET: 59 mL    PHYSICAL EXAM:  Neuro: Awake, responsive  CV: S1 S2 RRR  Lungs: CTABL  GI: Soft, BS +, ND, NT  Extremities: No edema    TELEMETRY: SR to tachycardia overnight  	     RADIOLOGY: < from: Xray Kidney Ureter Bladder (04.26.22 @ 11:46) >  INTERPRETATION:  Portable abdominal study. 2 images. Patient has vomiting.    No signs of bowel obstruction. Lower sacroiliac degeneration left greater  than right noted. No signs of free air.    IMPRESSION: As above.    < end of copied text >    < from: Xray Chest 1 View- PORTABLE-Routine (Xray Chest 1 View- PORTABLE-Routine .) (04.25.22 @ 21:13) >  INTERPRETATION:  AP chest on April 25, 2022 at 8:51 PM. Patient is preop.   CAT scan of the same day showed a GI bleed from the proximaltransverse   colon.    COMPARISON: None available.    Heart size is within normal limits.    Lungs are clear.    IMPRESSION: Negative chest.      < end of copied text >      DIAGNOSTIC TESTING:    [x ] Echocardiogram: < from: TTE Echo Complete w/o Contrast w/ Doppler (04.25.22 @ 17:11) >  Summary:   1. Left ventricular ejection fraction, by visual estimation, is 55 to   60%.   2. Normal global left ventricular systolic function.   3. Mild mitral valve regurgitation.   4. Mild tricuspid regurgitation.   5. Mild aortic regurgitation.   6. Sclerotic aortic valve with normal opening.   7. Mild pulmonic valve regurgitation.   8. Estimated pulmonary artery systolic pressure is 35.7 mmHg assuming a   right atrial pressure of 5 mmHg, which is consistent with mild pulmonary   hypertension.    < end of copied text >       LABS:	 	    CARDIAC MARKERS:    29 Apr 2022 06:51    145    |  111    |  4      ----------------------------<  95     3.4     |  28     |  0.90   28 Apr 2022 06:30    145    |  114    |  7      ----------------------------<  119    3.6     |  26     |  0.97   27 Apr 2022 06:33    143    |  110    |  9      ----------------------------<  100    3.8     |  27     |  1.03     Ca    7.8        29 Apr 2022 06:51    TPro  5.1    /  Alb  2.8    /  TBili  1.0    /  DBili  x      /  AST  37     /  ALT  24     /  AlkPhos  45     28 Apr 2022 06:30                          8.7    5.95  )-----------( 128      ( 29 Apr 2022 06:51 )             25.5 ,                       7.9    4.92  )-----------( 146      ( 28 Apr 2022 06:30 )             24.1 ,                       7.3    4.27  )-----------( 104      ( 27 Apr 2022 06:33 )             22.2   proBNP:   Lipid Profile:   HgA1c:   TSH:

## 2022-04-29 NOTE — PROGRESS NOTE ADULT - NS ATTEND AMEND GEN_ALL_CORE FT
Resume antiplatelets when okay with gastro.  Outpatient cardiac follow-up.  Signing off please call back if needed.

## 2022-04-30 LAB
HCT VFR BLD CALC: 31.9 % — LOW (ref 39–50)
HGB BLD-MCNC: 11 G/DL — LOW (ref 13–17)
MCHC RBC-ENTMCNC: 30.6 PG — SIGNIFICANT CHANGE UP (ref 27–34)
MCHC RBC-ENTMCNC: 34.5 G/DL — SIGNIFICANT CHANGE UP (ref 32–36)
MCV RBC AUTO: 88.9 FL — SIGNIFICANT CHANGE UP (ref 80–100)
NRBC # BLD: 0 /100 WBCS — SIGNIFICANT CHANGE UP (ref 0–0)
PLATELET # BLD AUTO: 128 K/UL — LOW (ref 150–400)
RBC # BLD: 3.59 M/UL — LOW (ref 4.2–5.8)
RBC # FLD: 13.8 % — SIGNIFICANT CHANGE UP (ref 10.3–14.5)
WBC # BLD: 5.91 K/UL — SIGNIFICANT CHANGE UP (ref 3.8–10.5)
WBC # FLD AUTO: 5.91 K/UL — SIGNIFICANT CHANGE UP (ref 3.8–10.5)

## 2022-04-30 PROCEDURE — 99232 SBSQ HOSP IP/OBS MODERATE 35: CPT

## 2022-04-30 RX ORDER — SENNA PLUS 8.6 MG/1
2 TABLET ORAL AT BEDTIME
Refills: 0 | Status: DISCONTINUED | OUTPATIENT
Start: 2022-04-30 | End: 2022-05-01

## 2022-04-30 RX ADMIN — SENNA PLUS 2 TABLET(S): 8.6 TABLET ORAL at 21:37

## 2022-04-30 RX ADMIN — Medication 1 APPLICATION(S): at 17:03

## 2022-04-30 RX ADMIN — Medication 1 APPLICATION(S): at 05:28

## 2022-04-30 RX ADMIN — PANTOPRAZOLE SODIUM 40 MILLIGRAM(S): 20 TABLET, DELAYED RELEASE ORAL at 17:03

## 2022-04-30 RX ADMIN — PANTOPRAZOLE SODIUM 40 MILLIGRAM(S): 20 TABLET, DELAYED RELEASE ORAL at 05:27

## 2022-04-30 RX ADMIN — ATORVASTATIN CALCIUM 40 MILLIGRAM(S): 80 TABLET, FILM COATED ORAL at 21:38

## 2022-04-30 NOTE — PROGRESS NOTE ADULT - ASSESSMENT
76 yo man with h/o diverticulosis presents now with painless acute blood loss per rectum.  Patient is S/P colonoscopy, biopsy of polyps, results are to be followed with GI. No true bleeding today.  S/P transfusion of 2 PRBCs yesterday. H & H is stable, will observe with BM to evaluate for bleeding.

## 2022-04-30 NOTE — PROGRESS NOTE ADULT - SUBJECTIVE AND OBJECTIVE BOX
Pt stable - s/p colonoscopy/EGD  + diverticulosis  No further bleeding      MEDICATIONS  (STANDING):  AQUAPHOR (petrolatum Ointment) 1 Application(s) Topical two times a day  atorvastatin 40 milliGRAM(s) Oral at bedtime  pantoprazole  Injectable 40 milliGRAM(s) IV Push two times a day    MEDICATIONS  (PRN):      Allergies    sulfa drugs (Unknown)    Intolerances        Vital Signs Last 24 Hrs  T(C): 36.7 (30 Apr 2022 16:04), Max: 36.7 (29 Apr 2022 23:50)  T(F): 98 (30 Apr 2022 16:04), Max: 98.1 (29 Apr 2022 23:50)  HR: 76 (30 Apr 2022 16:04) (70 - 76)  BP: 135/82 (30 Apr 2022 16:04) (119/69 - 135/82)  BP(mean): 86 (30 Apr 2022 10:02) (86 - 86)  RR: 18 (30 Apr 2022 16:04) (18 - 19)  SpO2: 99% (30 Apr 2022 16:04) (96% - 99%)    PHYSICAL EXAM:  General: NAD.  CVS: S1, S2  Chest: air entry bilaterally present  Abd: BS present, soft, non-tender      LABS:                        11.0   5.91  )-----------( 128      ( 30 Apr 2022 06:53 )             31.9     04-29    145  |  111<H>  |  4<L>  ----------------------------<  95  3.4<L>   |  28  |  0.90    Ca    7.8<L>      29 Apr 2022 06:51        diet as tolerated  stable  f/u with Dr Pina

## 2022-04-30 NOTE — PROGRESS NOTE ADULT - SUBJECTIVE AND OBJECTIVE BOX
Patient is a 77y old  Male who presents with a chief complaint of GI bleed (2022 15:23)    Patient seen and examined at bedside. No acute overnight events. Case discussed with nurse at bedside.  Patient states that he had not noticed any bleeds per rectum today.  patient is s/p 2 units of PRBC transfusion.  SUBJECTIVE & OBJECTIVE: Pt seen and examined at bedside.   PHYSICAL EXAM:  Vital Signs Last 24 Hrs  T(C): 36.7 (2022 16:04), Max: 37 (2022 16:44)  T(F): 98 (2022 16:04), Max: 98.6 (2022 16:44)  HR: 76 (2022 16:04) (70 - 76)  BP: 135/82 (2022 16:04) (119/69 - 151/75)  BP(mean): 86 (2022 10:02) (86 - 86)  RR: 18 (2022 16:04) (17 - 19)  SpO2: 99% (2022 16:04) (96% - 100%)   Daily     Daily Weight in k (2022 05:17)I&O's Detail    2022 07:01  -  2022 07:00  --------------------------------------------------------  IN:    Oral Fluid: 480 mL  Total IN: 480 mL    OUT:    Voided (mL): 3 mL  Total OUT: 3 mL    Total NET: 477 mL    Patient is calm and comfortable, alert and oriented, pleasant and corportative.  Patient is able speak fluently and effortless.     GENERAL: NAD, well-groomed, well-developed  HEAD:  Atraumatic, Normocephalic  EYES: EOMI, PERRLA, conjunctiva and sclera clear  ENMT: Moist mucous membranes  NECK: Supple, No JVD  NERVOUS SYSTEM:  Alert & Oriented X3, Motor Strength 5/5 B/L upper and lower extremities; DTRs 2+ intact and symmetric  CHEST/LUNG: Clear to auscultation bilaterally; No rales, rhonchi, wheezing, or rubs  HEART: Regular rate and rhythm; No murmurs, rubs, or gallops  ABDOMEN: Soft, Nontender, Nondistended; Bowel sounds present  EXTREMITIES:  2+ Peripheral Pulses, No clubbing, cyanosis, or edema  LABS:                        11.0   5.91  )-----------( 128      ( 2022 06:53 )             31.9       MEDICATIONS  (STANDING):  AQUAPHOR (petrolatum Ointment) 1 Application(s) Topical two times a day  atorvastatin 40 milliGRAM(s) Oral at bedtime  pantoprazole  Injectable 40 milliGRAM(s) IV Push two times a day    MEDICATIONS  (PRN):

## 2022-04-30 NOTE — PROGRESS NOTE ADULT - PROBLEM SELECTOR PLAN 1
No new bleed,  need transfusion to bring H & H to 10.  Awaiting BM to see if patient has stopped bleeding.

## 2022-05-01 VITALS
HEART RATE: 72 BPM | DIASTOLIC BLOOD PRESSURE: 74 MMHG | SYSTOLIC BLOOD PRESSURE: 133 MMHG | OXYGEN SATURATION: 98 % | TEMPERATURE: 98 F

## 2022-05-01 LAB
HCT VFR BLD CALC: 34.5 % — LOW (ref 39–50)
HGB BLD-MCNC: 11.9 G/DL — LOW (ref 13–17)
MCHC RBC-ENTMCNC: 30.6 PG — SIGNIFICANT CHANGE UP (ref 27–34)
MCHC RBC-ENTMCNC: 34.5 G/DL — SIGNIFICANT CHANGE UP (ref 32–36)
MCV RBC AUTO: 88.7 FL — SIGNIFICANT CHANGE UP (ref 80–100)
NRBC # BLD: 0 /100 WBCS — SIGNIFICANT CHANGE UP (ref 0–0)
PLATELET # BLD AUTO: 141 K/UL — LOW (ref 150–400)
RBC # BLD: 3.89 M/UL — LOW (ref 4.2–5.8)
RBC # FLD: 13.8 % — SIGNIFICANT CHANGE UP (ref 10.3–14.5)
WBC # BLD: 6.25 K/UL — SIGNIFICANT CHANGE UP (ref 3.8–10.5)
WBC # FLD AUTO: 6.25 K/UL — SIGNIFICANT CHANGE UP (ref 3.8–10.5)

## 2022-05-01 PROCEDURE — 99238 HOSP IP/OBS DSCHRG MGMT 30/<: CPT

## 2022-05-01 RX ORDER — LISINOPRIL 2.5 MG/1
20 TABLET ORAL DAILY
Refills: 0 | Status: DISCONTINUED | OUTPATIENT
Start: 2022-05-01 | End: 2022-05-01

## 2022-05-01 RX ORDER — METOPROLOL TARTRATE 50 MG
25 TABLET ORAL DAILY
Refills: 0 | Status: DISCONTINUED | OUTPATIENT
Start: 2022-05-01 | End: 2022-05-01

## 2022-05-01 RX ADMIN — PANTOPRAZOLE SODIUM 40 MILLIGRAM(S): 20 TABLET, DELAYED RELEASE ORAL at 05:34

## 2022-05-01 RX ADMIN — LISINOPRIL 20 MILLIGRAM(S): 2.5 TABLET ORAL at 09:55

## 2022-05-01 RX ADMIN — Medication 1 APPLICATION(S): at 05:35

## 2022-05-01 NOTE — PROGRESS NOTE ADULT - PROBLEM SELECTOR PROBLEM 4
Abdominal hernia

## 2022-05-01 NOTE — PROGRESS NOTE ADULT - PROBLEM SELECTOR PLAN 5
atorvastatin 40 milliGRAM(s) Oral at bedtime  metoprolol 25mg daily
metoprolol, lisinopril

## 2022-05-01 NOTE — PROGRESS NOTE ADULT - PROBLEM SELECTOR PLAN 1
No new bleed,  H & H is trending up 11.9 today.  Awaiting BM to see if patient has stopped bleeding.

## 2022-05-01 NOTE — DISCHARGE NOTE PROVIDER - NSDCCPCAREPLAN_GEN_ALL_CORE_FT
PRINCIPAL DISCHARGE DIAGNOSIS  Diagnosis: GI bleed  Assessment and Plan of Treatment: May resume ASA should consult with cardiology for benefits of plavix      SECONDARY DISCHARGE DIAGNOSES  Diagnosis: CAD (coronary atherosclerotic disease)  Assessment and Plan of Treatment: continue with aspirin and BBs.

## 2022-05-01 NOTE — DISCHARGE NOTE NURSING/CASE MANAGEMENT/SOCIAL WORK - NSDCPEFALRISK_GEN_ALL_CORE
For information on Fall & Injury Prevention, visit: [email or web address] OR  [email or web address] OR  https://www.cdc.gov/steadi/patient.html

## 2022-05-01 NOTE — DISCHARGE NOTE PROVIDER - HOSPITAL COURSE
Patient is a 77y old  Male who presents with a chief complaint of GI bleed (01 May 2022 09:30)    HPI:  Patient is a 77M with a PMH of diverticulitis, CAD, HLD who presents to the ED for rectal bleeding.  Patient states that from this morning he has had three episodes of maroon colored stools.  Denies history of weakness, dizziness, chest pain, palpitations, or dyspnea.  Had a colonscopy 5+ years ago when he was diagnosed with diverticulosis.  Admits to chronic constipation for the last 3 years.  Otherwise, patient complains of an inguinal hernia that he feels is worsening.  States that the hernia is often painful and becomes difficult to reduce.  No other complaints. Vitals stable, labs benign.  Will admit to med surg.    Due to bleeding aspirin was held and patient was scoped and noted to have some diverticular bleeds. Biopsies of polyps were taken and patient will follow up with Dr Garay for  results.   Post colonoscopy patient was transfused 2 units PRBCs to bring Hb level to 10 because of his CAD. Will resume aspirin because of CAD.  H & H is stable and no bleeding per rectum on BM. Will discharge home and follow up with PCP and GI.         Problem/Plan - 1:  ·  Problem: GIB (gastrointestinal bleeding).   ·  Plan: No new bleed,  H & H is trending up 11.9 today.  Awaiting BM to see if patient has stopped bleeding.     Problem/Plan - 2:  ·  Problem: Essential hypertension.   ·  Plan: continue lisinopril 20mg today  hold HCT 25 mg for  now.     Problem/Plan - 3:  ·  Problem: Hyperlipidemia.   ·  Plan: atorvastatin 40 milliGRAM(s) Oral at bedtime.     Problem/Plan - 4:  ·  Problem: Abdominal hernia.   ·  Plan: Reducible, not incarcerated, no pain.  Will get outpatient evaluation.  Also patient has right sided hard lymph node of the neck for which he needs outpatient evaluation and patient eas informed.     Problem/Plan - 5:  ·  Problem: CAD (coronary artery disease).   ·  Plan: atorvastatin 40 milliGRAM(s) Oral at bedtime  metoprolol 25mg daily.

## 2022-05-01 NOTE — PROGRESS NOTE ADULT - PROBLEM SELECTOR PROBLEM 1
GIB (gastrointestinal bleeding)

## 2022-05-01 NOTE — PROGRESS NOTE ADULT - PROVIDER SPECIALTY LIST ADULT
Gastroenterology
Cardiology
Cardiology
Gastroenterology
Hospitalist
Gastroenterology
Gastroenterology
Hospitalist

## 2022-05-01 NOTE — DISCHARGE NOTE NURSING/CASE MANAGEMENT/SOCIAL WORK - NSDCVIVACCINE_GEN_ALL_CORE_FT
influenza, injectable, quadrivalent, preservative free; 10-Nov-2014 16:49; Rio Moore ([place]MAN); [employer]; [id number]; IntraMuscular; Deltoid Right.; 0.5 milliLiter(s);   pneumococcal polysaccharide PPV23; 10-Nov-2014 16:54; Rio Moore (RN); [employer] [id number]; IntraMuscular; Deltoid Left.; 0.5 milliLiter(s);

## 2022-05-01 NOTE — PROGRESS NOTE ADULT - SUBJECTIVE AND OBJECTIVE BOX
Pt stable   Seen earlier today  Agree with d/c plan      MEDICATIONS  (STANDING):  AQUAPHOR (petrolatum Ointment) 1 Application(s) Topical two times a day  atorvastatin 40 milliGRAM(s) Oral at bedtime  lisinopril 20 milliGRAM(s) Oral daily  metoprolol succinate ER 25 milliGRAM(s) Oral daily  pantoprazole  Injectable 40 milliGRAM(s) IV Push two times a day  senna 2 Tablet(s) Oral at bedtime    MEDICATIONS  (PRN):      Allergies    sulfa drugs (Unknown)    Intolerances        Vital Signs Last 24 Hrs  T(C): 36.6 (01 May 2022 10:16), Max: 36.6 (01 May 2022 04:30)  T(F): 97.9 (01 May 2022 10:16), Max: 97.9 (01 May 2022 04:30)  HR: 72 (01 May 2022 10:16) (72 - 80)  BP: 133/74 (01 May 2022 10:16) (133/74 - 145/72)  BP(mean): --  RR: 18 (01 May 2022 04:30) (18 - 18)  SpO2: 98% (01 May 2022 10:16) (98% - 98%)    PHYSICAL EXAM:  General: NAD.  CVS: S1, S2  Chest: air entry bilaterally present  Abd: BS present, soft, non-tender      LABS:                        11.9   6.25  )-----------( 141      ( 01 May 2022 06:37 )             34.5           diet as tolerated  f/u with Dr Pina

## 2022-05-01 NOTE — DISCHARGE NOTE PROVIDER - NSDCMRMEDTOKEN_GEN_ALL_CORE_FT
aspirin 81 mg oral tablet, chewable: 1 tab(s) orally once a day  atorvastatin 40 mg oral tablet: 1 tab(s) orally once a day  lisinopril-hydrochlorothiazide 20 mg-25 mg oral tablet: 1 tab(s) orally once a day  metoprolol tartrate 25 mg oral tablet: 1 tab(s) orally once a day  Plavix 75 mg oral tablet: 1 tab(s) orally once a day

## 2022-05-01 NOTE — PROGRESS NOTE ADULT - PROBLEM SELECTOR PROBLEM 5
CAD (coronary artery disease)
Essential hypertension
CAD (coronary artery disease)
CAD (coronary artery disease)
Essential hypertension

## 2022-05-01 NOTE — DISCHARGE NOTE NURSING/CASE MANAGEMENT/SOCIAL WORK - PATIENT PORTAL LINK FT
You can access the FollowMyHealth Patient Portal offered by [facility] by registering at the following website: [email or web address]. By joining [facility]’s FollowMyHealth portal, you will also be able to view your health information using other applications (apps) compatible with our system.

## 2022-05-01 NOTE — PROGRESS NOTE ADULT - SUBJECTIVE AND OBJECTIVE BOX
Patient is a 77y old  Male who presents with a chief complaint of GI bleed (2022 19:40)    Patient seen and examined at bedside. No acute overnight events. Case discussed with nurse at bedside.   Denies fevers, chills, nausea, vomiting, chest pain, sob, palpitations, abdominal pain, diarrhea, dysuria. All other ROS negative. No bowel movement since yesterday, thus can't assess if there is bleeding per rectum.  SUBJECTIVE & OBJECTIVE: Pt seen and examined at bedside.   PHYSICAL EXAM:  Vital Signs Last 24 Hrs  T(C): 36.6 (01 May 2022 04:30), Max: 36.7 (2022 16:04)  T(F): 97.9 (01 May 2022 04:30), Max: 98 (2022 16:04)  HR: 80 (01 May 2022 04:30) (73 - 80)  BP: 145/72 (01 May 2022 04:30) (119/69 - 145/72)  BP(mean): 86 (2022 10:02) (86 - 86)  RR: 18 (01 May 2022 04:30) (18 - 18)  SpO2: 98% (01 May 2022 04:30) (96% - 99%)   Daily     Daily Weight in k (01 May 2022 04:30)I&O's Detail    2022 07:01  -  01 May 2022 07:00  --------------------------------------------------------  IN:    Oral Fluid: 472 mL  Total IN: 472 mL    OUT:  Total OUT: 0 mL    Total NET: 472 mL      01 May 2022 07:01  -  01 May 2022 09:31  --------------------------------------------------------  IN:    Oral Fluid: 237 mL  Total IN: 237 mL    OUT:  Total OUT: 0 mL    Total NET: 237 mL    Patient is calm and comfortable, alert and oriented, pleasant and corportative.  Patient is able speak fluently and effortless. Patient sitting up in bed, just finished eating breakfast.    GENERAL: NAD, well-groomed, well-developed  HEAD:  Atraumatic, Normocephalic  EYES: EOMI, PERRLA, conjunctiva and sclera clear  ENMT: Moist mucous membranes  NECK: Supple, No JVD  NERVOUS SYSTEM:  Alert & Oriented X3, Motor Strength 5/5 B/L upper and lower extremities; DTRs 2+ intact and symmetric  CHEST/LUNG: Clear to auscultation bilaterally; No rales, rhonchi, wheezing, or rubs  HEART: Regular rate and rhythm; No murmurs, rubs, or gallops  ABDOMEN: Soft, Nontender, Nondistended; Bowel sounds present  EXTREMITIES:  2+ Peripheral Pulses, No clubbing, cyanosis, or edema  LABS:                        11.9   6.25  )-----------( 141      ( 01 May 2022 06:37 )             34.5         MEDICATIONS  (STANDING):  AQUAPHOR (petrolatum Ointment) 1 Application(s) Topical two times a day  atorvastatin 40 milliGRAM(s) Oral at bedtime  pantoprazole  Injectable 40 milliGRAM(s) IV Push two times a day  senna 2 Tablet(s) Oral at bedtime    MEDICATIONS  (PRN):

## 2022-05-06 DIAGNOSIS — K57.31 DIVERTICULOSIS OF LARGE INTESTINE WITHOUT PERFORATION OR ABSCESS WITH BLEEDING: ICD-10-CM

## 2022-05-06 DIAGNOSIS — K63.5 POLYP OF COLON: ICD-10-CM

## 2022-05-06 DIAGNOSIS — K46.9 UNSPECIFIED ABDOMINAL HERNIA WITHOUT OBSTRUCTION OR GANGRENE: ICD-10-CM

## 2022-05-06 DIAGNOSIS — Z88.2 ALLERGY STATUS TO SULFONAMIDES: ICD-10-CM

## 2022-05-06 DIAGNOSIS — Z95.5 PRESENCE OF CORONARY ANGIOPLASTY IMPLANT AND GRAFT: ICD-10-CM

## 2022-05-06 DIAGNOSIS — Z79.82 LONG TERM (CURRENT) USE OF ASPIRIN: ICD-10-CM

## 2022-05-06 DIAGNOSIS — K92.1 MELENA: ICD-10-CM

## 2022-05-06 DIAGNOSIS — I25.10 ATHEROSCLEROTIC HEART DISEASE OF NATIVE CORONARY ARTERY WITHOUT ANGINA PECTORIS: ICD-10-CM

## 2022-05-06 DIAGNOSIS — Z79.02 LONG TERM (CURRENT) USE OF ANTITHROMBOTICS/ANTIPLATELETS: ICD-10-CM

## 2022-05-06 DIAGNOSIS — I10 ESSENTIAL (PRIMARY) HYPERTENSION: ICD-10-CM

## 2022-05-06 DIAGNOSIS — K29.70 GASTRITIS, UNSPECIFIED, WITHOUT BLEEDING: ICD-10-CM

## 2022-05-06 DIAGNOSIS — D62 ACUTE POSTHEMORRHAGIC ANEMIA: ICD-10-CM

## 2022-05-06 DIAGNOSIS — E78.5 HYPERLIPIDEMIA, UNSPECIFIED: ICD-10-CM

## 2022-05-06 DIAGNOSIS — I95.9 HYPOTENSION, UNSPECIFIED: ICD-10-CM

## 2023-01-12 ENCOUNTER — INPATIENT (INPATIENT)
Facility: HOSPITAL | Age: 78
LOS: 2 days | Discharge: HOME | End: 2023-01-15
Attending: INTERNAL MEDICINE | Admitting: INTERNAL MEDICINE
Payer: MEDICARE

## 2023-01-12 VITALS
SYSTOLIC BLOOD PRESSURE: 118 MMHG | TEMPERATURE: 98 F | DIASTOLIC BLOOD PRESSURE: 67 MMHG | HEART RATE: 70 BPM | OXYGEN SATURATION: 98 % | HEIGHT: 65 IN | RESPIRATION RATE: 20 BRPM | WEIGHT: 149.91 LBS

## 2023-01-12 DIAGNOSIS — K40.90 UNILATERAL INGUINAL HERNIA, WITHOUT OBSTRUCTION OR GANGRENE, NOT SPECIFIED AS RECURRENT: Chronic | ICD-10-CM

## 2023-01-12 LAB
ALBUMIN SERPL ELPH-MCNC: 3.4 G/DL — SIGNIFICANT CHANGE UP (ref 3.3–5)
ALP SERPL-CCNC: 63 U/L — SIGNIFICANT CHANGE UP (ref 40–120)
ALT FLD-CCNC: 23 U/L — SIGNIFICANT CHANGE UP (ref 12–78)
ANION GAP SERPL CALC-SCNC: 9 MMOL/L — SIGNIFICANT CHANGE UP (ref 5–17)
APTT BLD: 26.7 SEC — LOW (ref 27.5–35.5)
AST SERPL-CCNC: 28 U/L — SIGNIFICANT CHANGE UP (ref 15–37)
BASOPHILS # BLD AUTO: 0.02 K/UL — SIGNIFICANT CHANGE UP (ref 0–0.2)
BASOPHILS # BLD AUTO: 0.02 K/UL — SIGNIFICANT CHANGE UP (ref 0–0.2)
BASOPHILS NFR BLD AUTO: 0.4 % — SIGNIFICANT CHANGE UP (ref 0–2)
BASOPHILS NFR BLD AUTO: 0.5 % — SIGNIFICANT CHANGE UP (ref 0–2)
BILIRUB SERPL-MCNC: 0.9 MG/DL — SIGNIFICANT CHANGE UP (ref 0.2–1.2)
BLD GP AB SCN SERPL QL: SIGNIFICANT CHANGE UP
BUN SERPL-MCNC: 21 MG/DL — SIGNIFICANT CHANGE UP (ref 7–23)
CALCIUM SERPL-MCNC: 8.8 MG/DL — SIGNIFICANT CHANGE UP (ref 8.5–10.1)
CHLORIDE SERPL-SCNC: 105 MMOL/L — SIGNIFICANT CHANGE UP (ref 96–108)
CO2 SERPL-SCNC: 27 MMOL/L — SIGNIFICANT CHANGE UP (ref 22–31)
CREAT SERPL-MCNC: 1.2 MG/DL — SIGNIFICANT CHANGE UP (ref 0.5–1.3)
EGFR: 62 ML/MIN/1.73M2 — SIGNIFICANT CHANGE UP
EOSINOPHIL # BLD AUTO: 0.55 K/UL — HIGH (ref 0–0.5)
EOSINOPHIL # BLD AUTO: 0.63 K/UL — HIGH (ref 0–0.5)
EOSINOPHIL NFR BLD AUTO: 12.5 % — HIGH (ref 0–6)
EOSINOPHIL NFR BLD AUTO: 13.6 % — HIGH (ref 0–6)
FLUAV AG NPH QL: SIGNIFICANT CHANGE UP
FLUBV AG NPH QL: SIGNIFICANT CHANGE UP
GLUCOSE SERPL-MCNC: 108 MG/DL — HIGH (ref 70–99)
HCT VFR BLD CALC: 35.4 % — LOW (ref 39–50)
HCT VFR BLD CALC: 38.1 % — LOW (ref 39–50)
HGB BLD-MCNC: 11.4 G/DL — LOW (ref 13–17)
HGB BLD-MCNC: 11.9 G/DL — LOW (ref 13–17)
IMM GRANULOCYTES NFR BLD AUTO: 0.2 % — SIGNIFICANT CHANGE UP (ref 0–0.9)
IMM GRANULOCYTES NFR BLD AUTO: 0.2 % — SIGNIFICANT CHANGE UP (ref 0–0.9)
INR BLD: 1.07 RATIO — SIGNIFICANT CHANGE UP (ref 0.88–1.16)
LACTATE SERPL-SCNC: 1.7 MMOL/L — SIGNIFICANT CHANGE UP (ref 0.7–2)
LIDOCAIN IGE QN: 265 U/L — SIGNIFICANT CHANGE UP (ref 73–393)
LYMPHOCYTES # BLD AUTO: 1.32 K/UL — SIGNIFICANT CHANGE UP (ref 1–3.3)
LYMPHOCYTES # BLD AUTO: 1.55 K/UL — SIGNIFICANT CHANGE UP (ref 1–3.3)
LYMPHOCYTES # BLD AUTO: 30 % — SIGNIFICANT CHANGE UP (ref 13–44)
LYMPHOCYTES # BLD AUTO: 33.5 % — SIGNIFICANT CHANGE UP (ref 13–44)
MCHC RBC-ENTMCNC: 26.2 PG — LOW (ref 27–34)
MCHC RBC-ENTMCNC: 26.5 PG — LOW (ref 27–34)
MCHC RBC-ENTMCNC: 31.2 G/DL — LOW (ref 32–36)
MCHC RBC-ENTMCNC: 32.2 G/DL — SIGNIFICANT CHANGE UP (ref 32–36)
MCV RBC AUTO: 82.1 FL — SIGNIFICANT CHANGE UP (ref 80–100)
MCV RBC AUTO: 83.9 FL — SIGNIFICANT CHANGE UP (ref 80–100)
MONOCYTES # BLD AUTO: 0.49 K/UL — SIGNIFICANT CHANGE UP (ref 0–0.9)
MONOCYTES # BLD AUTO: 0.55 K/UL — SIGNIFICANT CHANGE UP (ref 0–0.9)
MONOCYTES NFR BLD AUTO: 10.6 % — SIGNIFICANT CHANGE UP (ref 2–14)
MONOCYTES NFR BLD AUTO: 12.5 % — SIGNIFICANT CHANGE UP (ref 2–14)
NEUTROPHILS # BLD AUTO: 1.93 K/UL — SIGNIFICANT CHANGE UP (ref 1.8–7.4)
NEUTROPHILS # BLD AUTO: 1.95 K/UL — SIGNIFICANT CHANGE UP (ref 1.8–7.4)
NEUTROPHILS NFR BLD AUTO: 41.7 % — LOW (ref 43–77)
NEUTROPHILS NFR BLD AUTO: 44.3 % — SIGNIFICANT CHANGE UP (ref 43–77)
NRBC # BLD: 0 /100 WBCS — SIGNIFICANT CHANGE UP (ref 0–0)
NRBC # BLD: 0 /100 WBCS — SIGNIFICANT CHANGE UP (ref 0–0)
PLATELET # BLD AUTO: 144 K/UL — LOW (ref 150–400)
PLATELET # BLD AUTO: 170 K/UL — SIGNIFICANT CHANGE UP (ref 150–400)
POTASSIUM SERPL-MCNC: 4.2 MMOL/L — SIGNIFICANT CHANGE UP (ref 3.5–5.3)
POTASSIUM SERPL-SCNC: 4.2 MMOL/L — SIGNIFICANT CHANGE UP (ref 3.5–5.3)
PROT SERPL-MCNC: 6.9 GM/DL — SIGNIFICANT CHANGE UP (ref 6–8.3)
PROTHROM AB SERPL-ACNC: 12.8 SEC — SIGNIFICANT CHANGE UP (ref 10.5–13.4)
RBC # BLD: 4.31 M/UL — SIGNIFICANT CHANGE UP (ref 4.2–5.8)
RBC # BLD: 4.54 M/UL — SIGNIFICANT CHANGE UP (ref 4.2–5.8)
RBC # FLD: 14.5 % — SIGNIFICANT CHANGE UP (ref 10.3–14.5)
RBC # FLD: 14.6 % — HIGH (ref 10.3–14.5)
SARS-COV-2 RNA SPEC QL NAA+PROBE: SIGNIFICANT CHANGE UP
SODIUM SERPL-SCNC: 141 MMOL/L — SIGNIFICANT CHANGE UP (ref 135–145)
WBC # BLD: 4.4 K/UL — SIGNIFICANT CHANGE UP (ref 3.8–10.5)
WBC # BLD: 4.63 K/UL — SIGNIFICANT CHANGE UP (ref 3.8–10.5)
WBC # FLD AUTO: 4.4 K/UL — SIGNIFICANT CHANGE UP (ref 3.8–10.5)
WBC # FLD AUTO: 4.63 K/UL — SIGNIFICANT CHANGE UP (ref 3.8–10.5)

## 2023-01-12 PROCEDURE — 71045 X-RAY EXAM CHEST 1 VIEW: CPT | Mod: 26

## 2023-01-12 PROCEDURE — 99223 1ST HOSP IP/OBS HIGH 75: CPT

## 2023-01-12 PROCEDURE — 74174 CTA ABD&PLVS W/CONTRAST: CPT | Mod: 26,MC

## 2023-01-12 PROCEDURE — 99285 EMERGENCY DEPT VISIT HI MDM: CPT

## 2023-01-12 PROCEDURE — 93010 ELECTROCARDIOGRAM REPORT: CPT

## 2023-01-12 RX ORDER — LISINOPRIL/HYDROCHLOROTHIAZIDE 10-12.5 MG
1 TABLET ORAL
Qty: 0 | Refills: 0 | DISCHARGE

## 2023-01-12 RX ORDER — INFLUENZA VIRUS VACCINE 15; 15; 15; 15 UG/.5ML; UG/.5ML; UG/.5ML; UG/.5ML
0.7 SUSPENSION INTRAMUSCULAR ONCE
Refills: 0 | Status: COMPLETED | OUTPATIENT
Start: 2023-01-12 | End: 2023-01-12

## 2023-01-12 RX ORDER — LANOLIN ALCOHOL/MO/W.PET/CERES
3 CREAM (GRAM) TOPICAL AT BEDTIME
Refills: 0 | Status: DISCONTINUED | OUTPATIENT
Start: 2023-01-12 | End: 2023-01-15

## 2023-01-12 RX ORDER — ACETAMINOPHEN 500 MG
650 TABLET ORAL EVERY 6 HOURS
Refills: 0 | Status: DISCONTINUED | OUTPATIENT
Start: 2023-01-12 | End: 2023-01-15

## 2023-01-12 RX ORDER — IRON SUCROSE 20 MG/ML
200 INJECTION, SOLUTION INTRAVENOUS ONCE
Refills: 0 | Status: DISCONTINUED | OUTPATIENT
Start: 2023-01-12 | End: 2023-01-12

## 2023-01-12 RX ORDER — ASPIRIN/CALCIUM CARB/MAGNESIUM 324 MG
1 TABLET ORAL
Qty: 0 | Refills: 0 | DISCHARGE

## 2023-01-12 RX ORDER — ONDANSETRON 8 MG/1
4 TABLET, FILM COATED ORAL EVERY 8 HOURS
Refills: 0 | Status: DISCONTINUED | OUTPATIENT
Start: 2023-01-12 | End: 2023-01-15

## 2023-01-12 RX ORDER — ATORVASTATIN CALCIUM 80 MG/1
40 TABLET, FILM COATED ORAL AT BEDTIME
Refills: 0 | Status: DISCONTINUED | OUTPATIENT
Start: 2023-01-12 | End: 2023-01-15

## 2023-01-12 RX ORDER — LISINOPRIL 2.5 MG/1
20 TABLET ORAL DAILY
Refills: 0 | Status: DISCONTINUED | OUTPATIENT
Start: 2023-01-12 | End: 2023-01-15

## 2023-01-12 RX ORDER — METOPROLOL TARTRATE 50 MG
1 TABLET ORAL
Qty: 0 | Refills: 0 | DISCHARGE

## 2023-01-12 RX ORDER — CLOPIDOGREL BISULFATE 75 MG/1
1 TABLET, FILM COATED ORAL
Qty: 0 | Refills: 0 | DISCHARGE

## 2023-01-12 RX ORDER — ATORVASTATIN CALCIUM 80 MG/1
1 TABLET, FILM COATED ORAL
Qty: 0 | Refills: 0 | DISCHARGE

## 2023-01-12 RX ORDER — SODIUM CHLORIDE 9 MG/ML
1000 INJECTION INTRAMUSCULAR; INTRAVENOUS; SUBCUTANEOUS ONCE
Refills: 0 | Status: COMPLETED | OUTPATIENT
Start: 2023-01-12 | End: 2023-01-12

## 2023-01-12 RX ORDER — IRON SUCROSE 20 MG/ML
200 INJECTION, SOLUTION INTRAVENOUS ONCE
Refills: 0 | Status: COMPLETED | OUTPATIENT
Start: 2023-01-12 | End: 2023-01-13

## 2023-01-12 RX ADMIN — Medication 3 MILLIGRAM(S): at 22:26

## 2023-01-12 RX ADMIN — ATORVASTATIN CALCIUM 40 MILLIGRAM(S): 80 TABLET, FILM COATED ORAL at 22:26

## 2023-01-12 RX ADMIN — SODIUM CHLORIDE 1000 MILLILITER(S): 9 INJECTION INTRAMUSCULAR; INTRAVENOUS; SUBCUTANEOUS at 16:14

## 2023-01-12 RX ADMIN — LISINOPRIL 20 MILLIGRAM(S): 2.5 TABLET ORAL at 22:27

## 2023-01-12 NOTE — ED ADULT NURSE NOTE - OBJECTIVE STATEMENT
77 year old male hx HTN and stents (2020) on aspirin 81 mg c/o bloody stools since yesterday with two episodes. Patient states having similar episode one year ago and needed to receive blood. Denies any nausea, CP or dizziness.

## 2023-01-12 NOTE — H&P ADULT - HISTORY OF PRESENT ILLNESS
77M with a PMH of diverticulitis, CAD, HLD who presents to the ED for rectal bleeding for 2 days of BRBPR. Pt reports BMS mostly blood but w/ some stool. Blood red in color, not melanic. Pt endorses occasional JANSEN. Pt w/ hx similar in past, requiring admission  and 2u PRBC. Denies F/C, h/a, dizziness, CP, palpitations, cough, abd pain, rectal pain, back pain, N/V, UTI sx, LE pain / swelling.    Denies history of weakness, dizziness, chest pain, palpitations, or dyspnea.  Had a colonscopy 5+ years ago when he was diagnosed with diverticulosis.  Admits to chronic constipation for the last 3 years.  Otherwise, patient complains of an inguinal hernia that he feels is worsening.  States that the hernia is often painful and becomes difficult to reduce.  No other complaints. Vitals stable, labs benign.  Will admit to med surg.   77M with a PMH of diverticulitis, CAD, HLD who presents to the ED for rectal bleeding for 2 days of BRBPR. Pt reports BMS mostly blood but w/ some stool. Blood red in color, not melanic. Pt endorses occasional JANSEN. Pt w/ hx similar in past, requiring admission  and 2u PRBC. Denies F/C, h/a, dizziness, CP, palpitations, cough, abd pain, rectal pain, back pain, N/V, UTI sx, LE pain / swelling.    Denies history of weakness, dizziness, chest pain, palpitations, or dyspnea.  Had a colonscopy 5+ years ago when he was diagnosed with diverticulosis.  Admits to chronic constipation for the last 3 years.  Otherwise, patient complains of an inguinal hernia that he feels is worsening.  States that the hernia is often painful and becomes difficult to reduce.  No other complaints. Vitals stable, labs benign.   77M with a PMH of diverticulitis, CAD, HLD who presents to the ED for rectal bleeding for 2 days of BRBPR. Pt reports mostly blood but w/ some stool. Blood red in color, not melanic. Pt endorses occasional JANSEN. Pt w/ hx similar in past, requiring admission  and 2u PRBC. Denies F/C, h/a, dizziness, CP, palpitations, cough, abd pain, rectal pain, back pain, N/V, UTI sx, LE pain / swelling.    Denies history of weakness, dizziness, chest pain, palpitations, or dyspnea.  Had a colonoscopy 5+ years ago when he was diagnosed with diverticulosis. Reports he last bloody bowel movement was  one bright red bloody bowel movement this Am.  Which was painless. otherwise  He has not had any further bowel movements.  No other complaints. Vitals stable, labs benign.

## 2023-01-12 NOTE — H&P ADULT - NSHPLABSRESULTS_GEN_ALL_CORE
LABS:  CAPILLARY BLOOD GLUCOSE                                11.4   4.63  )-----------( 144      ( 12 Jan 2023 18:23 )             35.4     01-12    141  |  105  |  21  ----------------------------<  108<H>  4.2   |  27  |  1.20    Ca    8.8      12 Jan 2023 15:20    TPro  6.9  /  Alb  3.4  /  TBili  0.9  /  DBili  x   /  AST  28  /  ALT  23  /  AlkPhos  63  01-12    PT/INR - ( 12 Jan 2023 15:20 )   PT: 12.8 sec;   INR: 1.07 ratio         PTT - ( 12 Jan 2023 15:20 )  PTT:26.7 sec            RADIOLOGY & ADDITIONAL TESTS:    Telemetry Personally Reviewed:    ECG Personally Reviewed:    Imaging Personally Reviewed:    Imaging Reviewed: < from: Xray Chest 1 View- PORTABLE-Urgent (01.12.23 @ 17:44) >    IMPRESSION: Clear lungs with no acute cardiopulmonary abnormalities.    < end of copied text >    < from: CT Angio Abdomen and Pelvis w/ IV Cont (01.12.23 @ 16:44) >    No active gastrointestinal bleed.    Severe colonic diverticulosis.    < end of copied text >        Consultant(s) Notes Reviewed:      Care Discussed with Consultants/Other Providers:

## 2023-01-12 NOTE — ED PROVIDER NOTE - PROGRESS NOTE DETAILS
Rpt H/H stable, CT AP w/ + severe colonic diverticulosis w/o active GIB. Shared decision making w/ pt, d/c home w/ close outpatient GI f/u v admission. Pt uncomfortable w/ d/c home given hx massive GIB requiring transfusion in past. Will admit to medicine (d/w Dr Silva). Pt understand / agrees w/ this plan.

## 2023-01-12 NOTE — ED ADULT TRIAGE NOTE - CHIEF COMPLAINT QUOTE
BRBPR  with clots observed yesterday. Denies trauma  or constipation. no c/o dizziness or weakness. H/O HTN.

## 2023-01-12 NOTE — ED PROVIDER NOTE - OBJECTIVE STATEMENT
77M PMH HTN pw 2 days of BRBPR. Pt reports BMS mostly blood but w/ some stool. Blood red in color, not melanic. Pt endorses occasional JANSEN. Pt w/ hx similar in past, requiring admission / 2u PRBC. Denies F/C, h/a, dizziness, CP, palpitations, cough, abd pain, rectal pain, back pain, N/V, UTI sx, LE pain / swelling.       PMH as above, PSH cataracts, hernia, LE arterial stent, Allergy Sulfa, meds as listed. 77M PMH HTN pw 2 days of BRBPR. Pt reports BMS mostly blood but w/ some stool. Blood red in color, not melanic. Pt endorses occasional JANSEN. Pt w/ hx similar in past, requiring admission / 2u PRBC. Denies F/C, h/a, dizziness, CP, palpitations, cough, abd pain, rectal pain, back pain, N/V, UTI sx, LE pain / swelling.     PMH as above, PSH cataracts, hernia, LE arterial stent, Allergy Sulfa, meds as listed.

## 2023-01-12 NOTE — H&P ADULT - ASSESSMENT
77M with a PMH of diverticulitis, CAD, HLD who presents to the ED for rectal bleeding.  Patient states that from this morning he has had three episodes of maroon colored stools.  Denies history of weakness, dizziness, chest pain, palpitations, or dyspnea.  Had a colonscopy 5+ years ago when he was diagnosed with diverticulosis.  Admits to chronic constipation for the last 3 years.  Otherwise, patient complains of an inguinal hernia that he feels is worsening.  States that the hernia is often painful and becomes difficult to reduce.  No other complaints. Vitals stable, labs benign.        #GIB (gastrointestinal bleeding).   ·  Plan:   - CTA No active gastrointestinal bleed. Severe colonic diverticulosis.    - On clear liquid Will make NPO if further GIB   -   GI consult in am  Hb stable.  - SCDs for prophyaxis.    #: Essential hypertension.   ·  Plan:  - Holding BP meds  metoprolol, lisinopril. given BP soft and GIB   - Restart as tolerated     # Hyperlipidemia.   ·  Plan: lipitor.    # Abdominal hernia.   ·  Plan: no significant pain, currently reducible.  Consider surgical eval if needed.   77M with a PMH of diverticulitis, CAD, HLD who presents to the ED for rectal bleeding.  Patient states that from this morning he has had three episodes of maroon colored stools.  Denies history of weakness, dizziness, chest pain, palpitations, or dyspnea.  Had a colonscopy 5+ years ago when he was diagnosed with diverticulosis.  Admits to chronic constipation for the last 3 years.  Otherwise, patient complains of an inguinal hernia that he feels is worsening.  States that the hernia is often painful and becomes difficult to reduce.  No other complaints. Vitals stable, labs benign.        #GIB (gastrointestinal bleeding).   - CTA No active gastrointestinal bleed. Severe colonic diverticulosis.    - On clear liquid Will make NPO if further GIB   -   GI consult in am  Hb stable.  - SCDs for prophyaxis.    #: Essential hypertension.   ·  Plan:  - Holding BP meds  metoprolol, lisinopril. given BP soft and GIB   - Restart as tolerated     # Hyperlipidemia.   ·  Plan: lipitor.    # Abdominal hernia.   ·  Plan: no significant pain, currently reducible.  Consider surgical eval if needed.   77M with a PMH of diverticulitis, CAD, HLD who presents to the ED for rectal bleeding.  Patient states that from this morning he has had three episodes of maroon colored stools.  Denies history of weakness, dizziness, chest pain, palpitations, or dyspnea.  Had a colonscopy 5+ years ago when he was diagnosed with diverticulosis.  Admits to chronic constipation for the last 3 years.  Otherwise, patient complains of an inguinal hernia that he feels is worsening.  States that the hernia is often painful and becomes difficult to reduce.  No other complaints. Vitals stable, labs benign.        #GIB (gastrointestinal bleeding).   - CTA No active gastrointestinal bleed. Severe colonic diverticulosis.  - On clear liquid Will make NPO if further GIB   -   GI consult in am vs. outpatient pending further episodes of GIB   Hb stable.  - SCDs for prophyaxis.    #: Essential hypertension.   ·  Plan:  - Will give lisinopril 20mg as pt repeat BP elevated and reports he did not take BP meds this AM   - Holding BP meds  metoprolol, .  - Restart as tolerated   #anemia  - Stable   - i/s/o of GIB   - F/u anemia studies   - CTM    - transfuse for CBC < 7   - will give IV iron one dose     # Hyperlipidemia.   ·  Plan: continue  lipitor.    # Medication reconciliation   - Call pharmacy in am. PAtient is not sure of all his medications

## 2023-01-12 NOTE — ED PROVIDER NOTE - CLINICAL SUMMARY MEDICAL DECISION MAKING FREE TEXT BOX
77M PMH HTN, hx rectal bleeding pw BRBPR x2 days. AF, VSS. Well appearing, in NAD. Benign abd exam. Clinical presentation most c/w diverticulosis / active lower GIB, anticipate admission +/- transfusion.

## 2023-01-12 NOTE — ED ADULT NURSE NOTE - ED STAT RN HANDOFF DETAILS 2
Report given to receiving med surg RN Alycia, pts history, current condition and reason for admission discussed, safety concerns addressed and reviewed. Pt A&Ox3, pt currently in stable condition, IV flushes for patency and site shows no signs or symptoms of infiltrate, dressing is clean dry and intact, pt is aware of plan of care. Pt education deemed successful at time of report after patient demonstrates successful teach back for proficiency.

## 2023-01-12 NOTE — H&P ADULT - NSHPREVIEWOFSYSTEMS_GEN_ALL_CORE
REVIEW OF SYSTEMS:    CONSTITUTIONAL: No weakness, fevers or chills  EYES/ENT: No visual changes;  no throat pain   NECK: No pain or stiffness  RESPIRATORY: No cough, wheezing, hemoptysis; ++  shortness of breath  CARDIOVASCULAR: No chest pain or palpitations  GASTROINTESTINAL: No abdominal or epigastric pain. No nausea, vomiting, or hematemesis; No diarrhea or constipation. No melena. ++  hematochezia.  GENITOURINARY: No dysuria, change in frequency or hematuria  NEUROLOGICAL: No numbness or weakness  SKIN: No itching, burning, rashes, or lesions   Psych: No depression   All other review of systems is negative unless indicated above.

## 2023-01-12 NOTE — H&P ADULT - NSHPPHYSICALEXAM_GEN_ALL_CORE
GENERAL: Elderly man in NAD  HEENT: EOMI, MMM, no oropharyngeal lesions or erythema appreciated  Pulm: normal work of breathing, CTABL  CV: RRR, S1&S2+, no m/r/g appreciated  ABDOMEN: soft, nt, nd, no hepatosplenomegaly  MSK: nl ROM  EXTREMITIES:  no appreciable edema in b/l LE  Neuro: A&Ox3, no focal deficits  SKIN: warm and dry, no visible rash

## 2023-01-13 LAB
ALBUMIN SERPL ELPH-MCNC: 3.1 G/DL — LOW (ref 3.3–5)
ALP SERPL-CCNC: 58 U/L — SIGNIFICANT CHANGE UP (ref 40–120)
ALT FLD-CCNC: 21 U/L — SIGNIFICANT CHANGE UP (ref 12–78)
ANION GAP SERPL CALC-SCNC: 9 MMOL/L — SIGNIFICANT CHANGE UP (ref 5–17)
AST SERPL-CCNC: 23 U/L — SIGNIFICANT CHANGE UP (ref 15–37)
BILIRUB SERPL-MCNC: 0.9 MG/DL — SIGNIFICANT CHANGE UP (ref 0.2–1.2)
BUN SERPL-MCNC: 17 MG/DL — SIGNIFICANT CHANGE UP (ref 7–23)
CALCIUM SERPL-MCNC: 8.2 MG/DL — LOW (ref 8.5–10.1)
CHLORIDE SERPL-SCNC: 109 MMOL/L — HIGH (ref 96–108)
CO2 SERPL-SCNC: 24 MMOL/L — SIGNIFICANT CHANGE UP (ref 22–31)
CREAT SERPL-MCNC: 1.01 MG/DL — SIGNIFICANT CHANGE UP (ref 0.5–1.3)
EGFR: 76 ML/MIN/1.73M2 — SIGNIFICANT CHANGE UP
FERRITIN SERPL-MCNC: 17 NG/ML — LOW (ref 30–400)
FOLATE SERPL-MCNC: 11.8 NG/ML — SIGNIFICANT CHANGE UP
GLUCOSE SERPL-MCNC: 90 MG/DL — SIGNIFICANT CHANGE UP (ref 70–99)
HCT VFR BLD CALC: 35.6 % — LOW (ref 39–50)
HGB BLD-MCNC: 11.3 G/DL — LOW (ref 13–17)
IRON SATN MFR SERPL: 416 UG/DL — HIGH (ref 45–165)
IRON SATN MFR SERPL: 95 % — HIGH (ref 16–55)
MCHC RBC-ENTMCNC: 26.3 PG — LOW (ref 27–34)
MCHC RBC-ENTMCNC: 31.7 G/DL — LOW (ref 32–36)
MCV RBC AUTO: 82.8 FL — SIGNIFICANT CHANGE UP (ref 80–100)
NRBC # BLD: 0 /100 WBCS — SIGNIFICANT CHANGE UP (ref 0–0)
PLATELET # BLD AUTO: 150 K/UL — SIGNIFICANT CHANGE UP (ref 150–400)
POTASSIUM SERPL-MCNC: 3.6 MMOL/L — SIGNIFICANT CHANGE UP (ref 3.5–5.3)
POTASSIUM SERPL-SCNC: 3.6 MMOL/L — SIGNIFICANT CHANGE UP (ref 3.5–5.3)
PROT SERPL-MCNC: 6.3 GM/DL — SIGNIFICANT CHANGE UP (ref 6–8.3)
RBC # BLD: 4.3 M/UL — SIGNIFICANT CHANGE UP (ref 4.2–5.8)
RBC # FLD: 14.5 % — SIGNIFICANT CHANGE UP (ref 10.3–14.5)
SODIUM SERPL-SCNC: 142 MMOL/L — SIGNIFICANT CHANGE UP (ref 135–145)
TIBC SERPL-MCNC: 438 UG/DL — HIGH (ref 220–430)
UIBC SERPL-MCNC: 22 UG/DL — LOW (ref 110–370)
VIT B12 SERPL-MCNC: 565 PG/ML — SIGNIFICANT CHANGE UP (ref 232–1245)
WBC # BLD: 4.13 K/UL — SIGNIFICANT CHANGE UP (ref 3.8–10.5)
WBC # FLD AUTO: 4.13 K/UL — SIGNIFICANT CHANGE UP (ref 3.8–10.5)

## 2023-01-13 PROCEDURE — 99233 SBSQ HOSP IP/OBS HIGH 50: CPT

## 2023-01-13 RX ADMIN — IRON SUCROSE 110 MILLIGRAM(S): 20 INJECTION, SOLUTION INTRAVENOUS at 02:48

## 2023-01-13 RX ADMIN — ATORVASTATIN CALCIUM 40 MILLIGRAM(S): 80 TABLET, FILM COATED ORAL at 21:41

## 2023-01-14 LAB
HCT VFR BLD CALC: 34.3 % — LOW (ref 39–50)
HGB BLD-MCNC: 10.8 G/DL — LOW (ref 13–17)
MCHC RBC-ENTMCNC: 26.2 PG — LOW (ref 27–34)
MCHC RBC-ENTMCNC: 31.5 G/DL — LOW (ref 32–36)
MCV RBC AUTO: 83.1 FL — SIGNIFICANT CHANGE UP (ref 80–100)
NRBC # BLD: 0 /100 WBCS — SIGNIFICANT CHANGE UP (ref 0–0)
PLATELET # BLD AUTO: 154 K/UL — SIGNIFICANT CHANGE UP (ref 150–400)
RBC # BLD: 4.13 M/UL — LOW (ref 4.2–5.8)
RBC # FLD: 14.5 % — SIGNIFICANT CHANGE UP (ref 10.3–14.5)
WBC # BLD: 4.45 K/UL — SIGNIFICANT CHANGE UP (ref 3.8–10.5)
WBC # FLD AUTO: 4.45 K/UL — SIGNIFICANT CHANGE UP (ref 3.8–10.5)

## 2023-01-14 PROCEDURE — 99232 SBSQ HOSP IP/OBS MODERATE 35: CPT

## 2023-01-14 RX ORDER — POLYETHYLENE GLYCOL 3350 17 G/17G
17 POWDER, FOR SOLUTION ORAL DAILY
Refills: 0 | Status: DISCONTINUED | OUTPATIENT
Start: 2023-01-14 | End: 2023-01-15

## 2023-01-14 RX ORDER — LACTULOSE 10 G/15ML
20 SOLUTION ORAL ONCE
Refills: 0 | Status: COMPLETED | OUTPATIENT
Start: 2023-01-14 | End: 2023-01-14

## 2023-01-14 RX ORDER — FAMOTIDINE 10 MG/ML
20 INJECTION INTRAVENOUS DAILY
Refills: 0 | Status: DISCONTINUED | OUTPATIENT
Start: 2023-01-14 | End: 2023-01-15

## 2023-01-14 RX ADMIN — POLYETHYLENE GLYCOL 3350 17 GRAM(S): 17 POWDER, FOR SOLUTION ORAL at 12:32

## 2023-01-14 RX ADMIN — LISINOPRIL 20 MILLIGRAM(S): 2.5 TABLET ORAL at 06:02

## 2023-01-14 RX ADMIN — LACTULOSE 20 GRAM(S): 10 SOLUTION ORAL at 12:32

## 2023-01-14 RX ADMIN — ATORVASTATIN CALCIUM 40 MILLIGRAM(S): 80 TABLET, FILM COATED ORAL at 22:48

## 2023-01-14 RX ADMIN — FAMOTIDINE 20 MILLIGRAM(S): 10 INJECTION INTRAVENOUS at 12:32

## 2023-01-14 NOTE — PROGRESS NOTE ADULT - ASSESSMENT
77M with a PMH of diverticulitis, CAD, HLD who presents to the ED for rectal bleeding.  Patient states that from this morning he has had three episodes of maroon colored stools.  Denies history of weakness, dizziness, chest pain, palpitations, or dyspnea.  Had a colonscopy 5+ years ago when he was diagnosed with diverticulosis.  Admits to chronic constipation for the last 3 years.  Otherwise, patient complains of an inguinal hernia that he feels is worsening.  States that the hernia is often painful and becomes difficult to reduce.  No other complaints. Vitals stable, labs benign.        #LGIB (gastrointestinal bleeding). Likely diverticular.   - CTA No active gastrointestinal bleed. Severe colonic diverticulosis.  + hematochezia this morning. Monitor CBC closely.   advance diet.   If hematochezia continues, Urgent GI consult. reviewed anemia work up.     #: Essential hypertension.   ·  controlled. cont current management. Monitor.     #anemia of chronic disease. at risk for blood loss anemia.  - Monitor closely while patient having LGIB.     # Hyperlipidemia.   ·  Plan: continue  lipitor.    Full code  SCDs     
77M with a PMH of diverticulitis, CAD, HLD who presents to the ED for rectal bleeding.  Patient states that from this morning he has had three episodes of maroon colored stools.  Denies history of weakness, dizziness, chest pain, palpitations, or dyspnea.  Had a colonscopy 5+ years ago when he was diagnosed with diverticulosis.  Admits to chronic constipation for the last 3 years.  Otherwise, patient complains of an inguinal hernia that he feels is worsening.  States that the hernia is often painful and becomes difficult to reduce.  No other complaints. Vitals stable, labs benign.        #LGIB (gastrointestinal bleeding). Likely diverticular.   - CTA No active gastrointestinal bleed. Severe colonic diverticulosis.  No BM in last 24 hours. reported blood on tissue paper this morning.    cont current diet  start laxatives. H and H slowly declining. Trend CBC.   If hematochezia continues, Urgent GI consult. reviewed anemia work up.     #: Essential hypertension.   ·  controlled. cont current management. Monitor.     #anemia of chronic disease. at risk for blood loss anemia.  - Monitor closely while patient having LGIB.     # Hyperlipidemia.   ·  Plan: continue  lipitor.    Full code  SCDs

## 2023-01-14 NOTE — PROGRESS NOTE ADULT - SUBJECTIVE AND OBJECTIVE BOX
CHIEF COMPLAINT: Follow up of LGIB.   No BM in last 24 hours.   Blood on tissue paper this morning  no dizziness or chest pain or sob  low grade fever  no cough  no diarrhea  no abd pain   no hematuria       PHYSICAL EXAM:    GENERAL: Overweight, no acute distress   CHEST/LUNG: Clear to ausculation bilaterally, no wheezing, no crackles   HEART: Regular rate and rhythm; No murmurs, rubs  ABDOMEN: Soft, Nontender, Nondistended; Bowel sounds present  EXTREMITIES:  Moving all four extremities spontaneously, No clubbing, cyanosis, or edema  NERVOUS SYSTEM:  Grossly non focal.  Psychiatry: AAO x 3, mood is appropriate       OBJECTIVE DATA:     Vital Signs Last 24 Hrs  T(C): 36.7 (14 Jan 2023 12:44), Max: 36.7 (13 Jan 2023 17:14)  T(F): 98.1 (14 Jan 2023 12:44), Max: 98.1 (13 Jan 2023 17:14)  HR: 69 (14 Jan 2023 12:44) (58 - 69)  BP: 115/69 (14 Jan 2023 12:44) (115/69 - 144/73)  BP(mean): --  RR: 17 (14 Jan 2023 12:44) (17 - 18)  SpO2: 99% (14 Jan 2023 12:44) (97% - 99%)    Parameters below as of 14 Jan 2023 05:31  Patient On (Oxygen Delivery Method): room air               Daily     Daily   LABS:                        10.8   4.45  )-----------( 154      ( 14 Jan 2023 06:35 )             34.3             01-13    142  |  109<H>  |  17  ----------------------------<  90  3.6   |  24  |  1.01    Ca    8.2<L>      13 Jan 2023 07:54    TPro  6.3  /  Alb  3.1<L>  /  TBili  0.9  /  DBili  x   /  AST  23  /  ALT  21  /  AlkPhos  58  01-13              PT/INR - ( 12 Jan 2023 15:20 )   PT: 12.8 sec;   INR: 1.07 ratio         PTT - ( 12 Jan 2023 15:20 )  PTT:26.7 sec         MEDICATIONS  (STANDING):  atorvastatin 40 milliGRAM(s) Oral at bedtime  famotidine    Tablet 20 milliGRAM(s) Oral daily  lisinopril 20 milliGRAM(s) Oral daily  polyethylene glycol 3350 17 Gram(s) Oral daily    MEDICATIONS  (PRN):  acetaminophen     Tablet .. 650 milliGRAM(s) Oral every 6 hours PRN Temp greater or equal to 38C (100.4F), Mild Pain (1 - 3)  aluminum hydroxide/magnesium hydroxide/simethicone Suspension 30 milliLiter(s) Oral every 4 hours PRN Dyspepsia  melatonin 3 milliGRAM(s) Oral at bedtime PRN Insomnia  ondansetron Injectable 4 milliGRAM(s) IV Push every 8 hours PRN Nausea and/or Vomiting      
CHIEF COMPLAINT: Follow up of LGIB.   + hematochezia this morning  no dizziness or chest pain or sob  low grade fever  no cough  no diarrhea  no abd pain   no hematuria       PHYSICAL EXAM:    GENERAL: Overweight, no acute distress   CHEST/LUNG: Clear to ausculation bilaterally, no wheezing, no crackles   HEART: Regular rate and rhythm; No murmurs, rubs  ABDOMEN: Soft, Nontender, Nondistended; Bowel sounds present  EXTREMITIES:  Moving all four extremities spontaneously, No clubbing, cyanosis, or edema  NERVOUS SYSTEM:  Grossly non focal.  Psychiatry: AAO x 3, mood is appropriate       OBJECTIVE DATA:   Vital Signs Last 24 Hrs  T(C): 37.3 (2023 11:30), Max: 37.8 (2023 05:00)  T(F): 99.1 (2023 11:30), Max: 100 (2023 05:00)  HR: 77 (2023 11:30) (63 - 78)  BP: 122/69 (2023 11:30) (121/71 - 185/83)  BP(mean): --  RR: 18 (2023 11:30) (16 - 18)  SpO2: 95% (2023 11:30) (95% - 98%)    Parameters below as of 2023 11:30  Patient On (Oxygen Delivery Method): room air               Daily Height in cm: 157.48 (2023 22:14)    Daily Weight in k.5 (2023 22:14)  LABS:                        11.3   4.13  )-----------( 150      ( 2023 07:54 )             35.6             01-13    142  |  109<H>  |  17  ----------------------------<  90  3.6   |  24  |  1.01    Ca    8.2<L>      2023 07:54    TPro  6.3  /  Alb  3.1<L>  /  TBili  0.9  /  DBili  x   /  AST  23  /  ALT  21  /  AlkPhos  58  01-13              PT/INR - ( 2023 15:20 )   PT: 12.8 sec;   INR: 1.07 ratio         PTT - ( 2023 15:20 )  PTT:26.7 sec        Interval Radiology studies: reviewed by me    < from: Xray Chest 1 View- PORTABLE-Urgent (23 @ 17:44) >    IMPRESSION: Clear lungs with no acute cardiopulmonary abnormalities.    < end of copied text >      MEDICATIONS  (STANDING):  atorvastatin 40 milliGRAM(s) Oral at bedtime  lisinopril 20 milliGRAM(s) Oral daily    MEDICATIONS  (PRN):  acetaminophen     Tablet .. 650 milliGRAM(s) Oral every 6 hours PRN Temp greater or equal to 38C (100.4F), Mild Pain (1 - 3)  aluminum hydroxide/magnesium hydroxide/simethicone Suspension 30 milliLiter(s) Oral every 4 hours PRN Dyspepsia  melatonin 3 milliGRAM(s) Oral at bedtime PRN Insomnia  ondansetron Injectable 4 milliGRAM(s) IV Push every 8 hours PRN Nausea and/or Vomiting

## 2023-01-15 VITALS
OXYGEN SATURATION: 98 % | HEART RATE: 68 BPM | TEMPERATURE: 98 F | DIASTOLIC BLOOD PRESSURE: 72 MMHG | SYSTOLIC BLOOD PRESSURE: 143 MMHG | RESPIRATION RATE: 16 BRPM

## 2023-01-15 LAB
HCT VFR BLD CALC: 33.7 % — LOW (ref 39–50)
HGB BLD-MCNC: 10.6 G/DL — LOW (ref 13–17)
MCHC RBC-ENTMCNC: 26.2 PG — LOW (ref 27–34)
MCHC RBC-ENTMCNC: 31.5 G/DL — LOW (ref 32–36)
MCV RBC AUTO: 83.4 FL — SIGNIFICANT CHANGE UP (ref 80–100)
NRBC # BLD: 0 /100 WBCS — SIGNIFICANT CHANGE UP (ref 0–0)
PLATELET # BLD AUTO: 145 K/UL — LOW (ref 150–400)
RBC # BLD: 4.04 M/UL — LOW (ref 4.2–5.8)
RBC # FLD: 14.3 % — SIGNIFICANT CHANGE UP (ref 10.3–14.5)
WBC # BLD: 4.01 K/UL — SIGNIFICANT CHANGE UP (ref 3.8–10.5)
WBC # FLD AUTO: 4.01 K/UL — SIGNIFICANT CHANGE UP (ref 3.8–10.5)

## 2023-01-15 PROCEDURE — 99239 HOSP IP/OBS DSCHRG MGMT >30: CPT

## 2023-01-15 RX ORDER — FAMOTIDINE 10 MG/ML
1 INJECTION INTRAVENOUS
Qty: 0 | Refills: 0 | DISCHARGE
Start: 2023-01-15

## 2023-01-15 RX ADMIN — FAMOTIDINE 20 MILLIGRAM(S): 10 INJECTION INTRAVENOUS at 11:48

## 2023-01-15 RX ADMIN — LISINOPRIL 20 MILLIGRAM(S): 2.5 TABLET ORAL at 06:14

## 2023-01-15 RX ADMIN — POLYETHYLENE GLYCOL 3350 17 GRAM(S): 17 POWDER, FOR SOLUTION ORAL at 11:48

## 2023-01-15 NOTE — DISCHARGE NOTE NURSING/CASE MANAGEMENT/SOCIAL WORK - NSDCPEFALRISK_GEN_ALL_CORE
For information on Fall & Injury Prevention, visit: [email or web address] OR  [email or web address] OR  https://www.cdc.gov/steadi/patient.html

## 2023-01-15 NOTE — DISCHARGE NOTE PROVIDER - HOSPITAL COURSE
HPI: 77M with a PMH of diverticulitis, CAD, HLD who presents to the ED for rectal bleeding.  Patient states that from this morning he has had three episodes of maroon colored stools.  Denies history of weakness, dizziness, chest pain, palpitations, or dyspnea.  Had a colonscopy 5+ years ago when he was diagnosed with diverticulosis.  Admits to chronic constipation for the last 3 years.  Otherwise, patient complains of an inguinal hernia that he feels is worsening.  States that the hernia is often painful and becomes difficult to reduce.  No other complaints. Vitals stable, labs benign.      Course:     #LGIB (gastrointestinal bleeding). Likely diverticular with chronic blood loss anemia superimposed on anemia of chronic disease (POA)   - CTA No active gastrointestinal bleed. Severe colonic diverticulosis.  Hematochezia stopped. no n/v/abd pain.   H and H overall stable. recommended to follow up with PCP and GI in 1-2 weeks.       #: Essential hypertension.   ·  controlled. cont current management. Monitor.       # Hyperlipidemia.   ·  Plan: continue statin.     GERD: recommended pepcid x 2 weeks and avoid alcohol and NSAIDs (ibuprofen and advil etc.)    Seen and examined by me today. Vitals stable.   I have discussed all the inpatient radiographic findings with the patient and stressed that patient follows with the PCP for further outpatient care. I have educated patient to use [facility] [employer] patient portal (as instructed on the discharge paperwork) to access medical records outside the hospital.   All questions welcomed and answered appropriately. Patient verbalized understanding of post discharge physician's follows up and discharge instructions.   DC time spent by me excluding face to face billable procedures 38 mins

## 2023-01-15 NOTE — DISCHARGE NOTE PROVIDER - CARE PROVIDER_API CALL
Your PCP in a week,   Phone: (   )    -  Fax: (   )    -  Follow Up Time:     Azeb Gutierrez)  Gastroenterology; Internal Medicine  [address] [place] [address]  Phone: [phone]  Fax: [phone]  Follow Up Time: 2 weeks

## 2023-01-15 NOTE — DISCHARGE NOTE NURSING/CASE MANAGEMENT/SOCIAL WORK - PATIENT PORTAL LINK FT
You can access the FollowMyHealth Patient Portal offered by [facility] by registering at the following website: [email or web address]. By joining [facility]’s FollowMyHealth portal, you will also be able to view your health information using other applications (apps) compatible with our system.

## 2023-01-15 NOTE — DISCHARGE NOTE NURSING/CASE MANAGEMENT/SOCIAL WORK - NSDCVIVACCINE_GEN_ALL_CORE_FT
influenza, injectable, quadrivalent, preservative free; 10-Nov-2014 16:49; Rio Moore (NORMAN); [employer]; [id number]; IntraMuscular; Deltoid Right.; 0.5 milliLiter(s);   pneumococcal polysaccharide PPV23; 10-Nov-2014 16:54; Rio Moore (RN); [employer] [id number]; IntraMuscular; Deltoid Left.; 0.5 milliLiter(s);

## 2023-01-15 NOTE — DISCHARGE NOTE PROVIDER - NSDCMRMEDTOKEN_GEN_ALL_CORE_FT
aspirin 81 mg oral tablet, chewable: 1 tab(s) orally once a day  atorvastatin 40 mg oral tablet: 1 tab(s) orally once a day  famotidine 20 mg oral tablet: 1 tab(s) orally once a day  lisinopril-hydrochlorothiazide 20 mg-25 mg oral tablet: 1 tab(s) orally once a day  metoprolol tartrate 25 mg oral tablet: 1 tab(s) orally once a day

## 2023-01-15 NOTE — DISCHARGE NOTE PROVIDER - NSDCCPCAREPLAN_GEN_ALL_CORE_FT
PRINCIPAL DISCHARGE DIAGNOSIS  Diagnosis: Bright red rectal bleeding  Assessment and Plan of Treatment:

## 2023-01-15 NOTE — DISCHARGE NOTE PROVIDER - PROVIDER TOKENS
FREE:[LAST:[Your PCP in a week],PHONE:[(   )    -],FAX:[(   )    -]],PROVIDER:[TOKEN:[[id number]],FOLLOWUP:[2 weeks]]

## 2023-01-15 NOTE — DISCHARGE NOTE PROVIDER - NSDCFUADDINST_GEN_ALL_CORE_FT
1) It is important to see your primary physician as well as other necessary consultants within next 7-10 days to perform a comprehensive medical review.  Call their offices for an appointment as soon as you leave the hospital.  If you do not have a primary physician or unable to reach your PCP, contact the [facility] Physician Referral Service at [phone].  Your medical issues appear to be stable at this time, but if your symptoms recur or worsen, contact your physicians and/or return to the hospital if necessary.  If you encounter any issues or questions with your medication, call your physicians before stopping the medication.    2) Please access [facility] Patient portal (as instructed on the discharge paperwork) to access your medical records at any time after discharge.     3) avoid alcohol and NSAIDs (ibuprofen and advil etc.)    4) Follow up with GI for possible outpatient elective colonoscopy.

## 2023-01-20 DIAGNOSIS — K57.31 DIVERTICULOSIS OF LARGE INTESTINE WITHOUT PERFORATION OR ABSCESS WITH BLEEDING: ICD-10-CM

## 2023-01-20 DIAGNOSIS — Z88.2 ALLERGY STATUS TO SULFONAMIDES: ICD-10-CM

## 2023-01-20 DIAGNOSIS — I25.10 ATHEROSCLEROTIC HEART DISEASE OF NATIVE CORONARY ARTERY WITHOUT ANGINA PECTORIS: ICD-10-CM

## 2023-01-20 DIAGNOSIS — Z79.82 LONG TERM (CURRENT) USE OF ASPIRIN: ICD-10-CM

## 2023-01-20 DIAGNOSIS — I10 ESSENTIAL (PRIMARY) HYPERTENSION: ICD-10-CM

## 2023-01-20 DIAGNOSIS — E78.5 HYPERLIPIDEMIA, UNSPECIFIED: ICD-10-CM

## 2023-01-30 NOTE — INPATIENT CERTIFICATION FOR MEDICARE PATIENTS - CURRENT MEDICAL NEEDS AND CARE PLANS
"Sandra Steven" Janak was seen and treated in our emergency department on 1/30/2023.  She may return to work on 02/01/2023.       If you have any questions or concerns, please don't hesitate to call.      Kristan Schuler NP"
"Sandra Steven" Janak was seen and treated in our emergency department on 1/30/2023.  She may return to work on 02/01/2023.       If you have any questions or concerns, please don't hesitate to call.      Kristan Schuler NP"
Other:

## 2023-05-04 NOTE — PATIENT PROFILE ADULT - FUNCTIONAL ASSESSMENT - DAILY ACTIVITY 4.
Patient was given Depo Provera in the Left ventrogluteal  NDC# 76586-996-04  LOT# [id number]  Exp date- 06/30/2024  Patient's last injection was 02/16/2023. Patient's last annual exam was on 12/01/2023. Patient tolerated well without difficulty. 4 = No assist / stand by assistance

## 2023-06-13 NOTE — PATIENT PROFILE ADULT - FUNCTIONAL SCREEN CURRENT LEVEL: COMMUNICATION, MLM
Family Medicine Clinic Note    Chief Complaint   Patient presents with   • Rash     Rash bilateral lower legs x 2 weeks    • Office Visit     HISTORY OF PRESENT ILLNESS     Patient is a 59 year old female who presents with bug bites BL LE.    About two weeks ago was camping in [place], noticed bug bites on lower BL legs, believes them to be mosquito bites, they were itching and she scratched them and now they are very red and superficial skin is excoriated. Is having itching at night, believes she is scratching them in her sleep. No fevers or chills, does not feel sick. Overall things stable. Has tried antibiotic ointment, anti-itch creams, hydrocortisone, hydrogen peroxide. The past few days has been using antibiotic ointment with pain reliever and hydrogen peroxide.     Missed specialist appointments and labs as she was down in [place] helping her mother who was sick.     She does have pain management appointment later today, video visit as she this provider is in [place].     MEDICAL, FAMILY AND SOCIAL HISTORY     Problem List:  Patient Active Problem List   Diagnosis   • Anemia   • Anxiety disorder, unspecified   • Insomnia due to mental disorder   • Arthralgia of temporomandibular joint   • Backache   • Chronic kidney disease, unspecified   • Chronic pain disorder   • Migraine   • Mixed hyperlipidemia   • Nicotine dependence, unspecified, uncomplicated   • Other depressive disorder   • Restless legs syndrome   • Ulcerative colitis without complications (CMD)   • Vitamin D insufficiency   • Gastroesophageal reflux disease without esophagitis   • Elevated blood pressure reading   • Hematuria   • Wound of left leg   • Wound of right leg     Past Medical/Surgical History:  Past Medical History:   Diagnosis Date   • Hematuria, microscopic 9/26/2016   • Renal failure, acute (CMD) 1/1/2012    Formatting of this note might be different from the original. Isabella Cahrlton creat normalized by 4-2012     History  reviewed. No pertinent surgical history.    Family History:  Family History   Problem Relation Age of Onset   • Diabetes Mother    • Hypertension Mother    • Hyperlipidemia Mother    • Alcohol Abuse Father    • Cancer, Lung Sister    • Alcohol Abuse Sister    • Cancer Maternal Grandmother    • Bipolar disorder Paternal Grandmother      Social History:  Social History     Tobacco Use   • Smoking status: Every Day     Current packs/day: 0.50     Types: Cigarettes   • Smokeless tobacco: Never   Substance Use Topics   • Alcohol use: Not Currently     Medications:  Current Outpatient Medications   Medication Sig Dispense Refill   • Ubrelvy 50 MG tablet      • gabapentin (NEURONTIN) 600 MG tablet      • hydrOXYzine (ATARAX) 25 MG tablet Take 1 tablet by mouth every 8 hours as needed for Anxiety. 90 tablet 1   • imipramine (TOFRANIL) 50 MG tablet Take 3 tablets by mouth nightly. 270 tablet 0   • omeprazole (PrilOSEC) 20 MG capsule Take 1 capsule by mouth daily. 90 capsule 0   • nicotine polacrilex (NICORETTE) 4 MG gum Take 1 each by mouth every hour as needed for Smoking cessation (Max 20 per day.). 20 each 2   • cetirizine (ZyrTEC) 10 MG tablet Take 10 mg by mouth daily.     • topiramate 100 MG extended-release capsule TAKE 1 CAPSULE BY MOUTH EVERY DAY     • diclofenac (VOLTAREN) 1 % gel APPLY TOPICALLY TO THE AFFECTED AREA THREE TIMES DAILY     • atorvastatin (LIPITOR) 20 MG tablet Take 1 tablet by mouth daily. 90 tablet 3   • carisoprodol (SOMA) 350 MG tablet Take 350 mg by mouth in the morning and 350 mg at noon and 350 mg in the evening.     • HYDROcodone-acetaminophen (NORCO)  MG per tablet Take 1 tablet by mouth 4 times daily.     • naLOXone (NARCAN) 4 MG/0.1ML nasal spray CALL 911. SPR CONTENTS OF ONE SPRAYER (0.1ML) INTO ONE NOSTRIL. REPEAT IN 2-3 MIN IF SYMPTOMS OF OPIOID EMERGENCY PERSIST, ALTERNATE NOSTRILS     • prochlorperazine (COMPAZINE) 5 MG tablet      • SUMAtriptan (IMITREX) 20 MG/ACT nasal spray  TAKE 1 PUFF AT ONSET OF MIGRAINE     • sumatriptan (IMITREX) 100 MG tablet TAKE 1 TABLET EVERY HEADACHE AS NEEDED MAY REPEAT IN 2 HOURS IF SYMPTOMS PERSIST     • SUMAtriptan (IMITREX STATDOSE) 6 MG/0.5ML auto-injector INJECT 0.5 ML EVERY HOUR SUBCUTANEOUS AS NEEDED FOR AT BEDTIME     • zolpidem (AMBIEN) 10 MG tablet Take 10 mg by mouth at bedtime.       No current facility-administered medications for this visit.     Allergies:  ALLERGIES:   Allergen Reactions   • Morphine Other (See Comments)     Avoids due to suspected with relation to renal failure, tolerated hydrocodone and dilaudid without adverse effect.     REVIEW OF SYSTEMS     Review of Systems    See HPI for pertinent positives and negatives.     PHYSICAL EXAM   Vitals:  Visit Vitals  BP (!) 148/96 (BP Location: LUE - Left upper extremity, Patient Position: Sitting, Cuff Size: Regular)   Pulse 87   Temp 98.8 °F (37.1 °C) (Temporal)   Ht 5' 7.5\" (1.715 m)   Wt 76.1 kg (167 lb 11.2 oz)   SpO2 97%   BMI 25.88 kg/m²       Physical Exam  Vitals and nursing note reviewed.   Constitutional:       General: She is not in acute distress.     Appearance: She is not ill-appearing.   HENT:      Head: Normocephalic and atraumatic.   Eyes:      Extraocular Movements: Extraocular movements intact.   Cardiovascular:      Rate and Rhythm: Normal rate and regular rhythm.      Pulses: Normal pulses.      Heart sounds: Normal heart sounds.   Pulmonary:      Effort: Pulmonary effort is normal.      Breath sounds: Normal breath sounds.   Musculoskeletal:      Right lower leg: Edema present.      Left lower leg: Edema present.      Comments: 1+ pitting edema BL LE   Skin:     Capillary Refill: Capillary refill takes less than 2 seconds.      Comments: See pictures as well. Areas pictured are non-tender, no warmth or drainage   Neurological:      Mental Status: She is alert.      Comments: Alert and oriented to person, place, time, situation.      Psychiatric:         Attention  and Perception: Attention and perception normal.         Mood and Affect: Mood and affect normal.         Speech: Speech normal.         Behavior: Behavior normal. Behavior is cooperative.         Thought Content: Thought content normal.         Cognition and Memory: Cognition and memory normal.                   ASSESSMENT/PLAN     Patient is a 59 year old female who presents with bug bites BL LE.      1. Wound of left lower extremity, initial encounter  Not infected, likely started as bug bites she scratched too much, complicated by use of hydrogen peroxide. Instructed patient: Gently was legs with soap and water each day and pat dry with paper towel. Then put plain Vaseline on the wound and keep it covered. Do this process once every 24 hours. No hydrogen peroxide on the wound, just use the plain Vaseline.     2. Wound of right lower extremity, initial encounter  See above    3. Anxiety disorder, unspecified type  Needs a refill, did well on this med. States mood is good. Crisis resources provided. Declined working with therapy.  - hydrOXYzine (ATARAX) 25 MG tablet; Take 1 tablet by mouth every 8 hours as needed for Anxiety.  Dispense: 90 tablet; Refill: 1    4. Gastroesophageal reflux disease without esophagitis  Stable, refill provided  - omeprazole (PrilOSEC) 20 MG capsule; Take 1 capsule by mouth daily.  Dispense: 90 capsule; Refill: 0    5. Mixed hyperlipidemia  - Lipid Panel With Reflex; Future    6. Vitamin D insufficiency  - Vitamin D -25 Hydroxy; Future    7. Ulcerative colitis without complications, unspecified location (CMD)  Lost to follow-up with GI  - SERVICE TO GASTROENTEROLOGY    8. Chronic kidney disease, unspecified CKD stage  Lost to follow-up with nephrology, discussed hydration and avoid nephrotoxins, will discuss discontinuing PPI at next visit.  - SERVICE TO NEPHROLOGY  - Comprehensive Metabolic Panel; Future    9. Anemia, unspecified type  Noted on prior labs  - CBC with Automated  Differential; Future    10. Insomnia due to mental disorder  Stable, needs refill, denied any cardiac symptoms/issues.  - imipramine (TOFRANIL) 50 MG tablet; Take 3 tablets by mouth nightly.  Dispense: 270 tablet; Refill: 0    11. Cigarette nicotine dependence without complication  Counseling provided  - nicotine polacrilex (NICORETTE) 4 MG gum; Take 1 each by mouth as needed for Smoking cessation.  Dispense: 20 each; Refill: 1    12. Encounter for screening mammogram for malignant neoplasm of breast  - MAMMO SCREENING BILATERAL W ISAEL; Future    13. Hematuria, unspecified type  Noted previously, was seeing nephrology for this.  - Urinalysis & Reflex Microscopy; Future    14. Elevated blood pressure reading  Discussed cutting back on salt and smoking cessation, likely meds next visit.    15. Chronic pain disorder  Follows with pain management in [place].     16. Migraine without status migrainosus, not intractable, unspecified migraine type  Follows with pain management in [place].         I spent a total of 50 minutes on the day of the visit.  This includes chart review, documenting, referring/communicating with other health care professionals and time with patient.    Discussed medications (including side effects and proper use), return precautions, and plan of care. All questions answered, patient in agreement with plan.     Return in about 2 weeks (around 6/27/2023) for Yearly physical, sooner if concerns, Labs today, Imaging today.         Neil Naylor MD  Family Medicine   0 = understands/communicates without difficulty

## 2023-07-26 NOTE — ED PROVIDER NOTE - PATIENT PORTAL LINK FT
You can access the FollowMyHealth Patient Portal offered by [facility] by registering at the following website: [email or web address]. By joining [facility]’s FollowMyHealth portal, you will also be able to view your health information using other applications (apps) compatible with our system. Adbry Counseling: I discussed with the patient the risks of tralokinumab including but not limited to eye infection and irritation, cold sores, injection site reactions, worsening of asthma, allergic reactions and increased risk of parasitic infection.  Live vaccines should be avoided while taking tralokinumab. The patient understands that monitoring is required and they must alert us or the primary physician if symptoms of infection or other concerning signs are noted.

## 2025-03-30 ENCOUNTER — INPATIENT (INPATIENT)
Facility: HOSPITAL | Age: 80
LOS: 6 days | Discharge: HOME | End: 2025-04-06
Attending: FAMILY MEDICINE | Admitting: FAMILY MEDICINE
Payer: MEDICARE

## 2025-03-30 VITALS
HEART RATE: 18 BPM | DIASTOLIC BLOOD PRESSURE: 72 MMHG | TEMPERATURE: 98 F | SYSTOLIC BLOOD PRESSURE: 125 MMHG | OXYGEN SATURATION: 98 % | RESPIRATION RATE: 18 BRPM | WEIGHT: 149.91 LBS | HEIGHT: 61 IN

## 2025-03-30 DIAGNOSIS — K40.90 UNILATERAL INGUINAL HERNIA, WITHOUT OBSTRUCTION OR GANGRENE, NOT SPECIFIED AS RECURRENT: Chronic | ICD-10-CM

## 2025-03-30 LAB
ALBUMIN SERPL ELPH-MCNC: 3.2 G/DL — LOW (ref 3.3–5)
ALP SERPL-CCNC: 64 U/L — SIGNIFICANT CHANGE UP (ref 40–120)
ALT FLD-CCNC: 20 U/L — SIGNIFICANT CHANGE UP (ref 12–78)
ANION GAP SERPL CALC-SCNC: 3 MMOL/L — LOW (ref 5–17)
APTT BLD: 18.6 SEC — LOW (ref 24.5–35.6)
AST SERPL-CCNC: 29 U/L — SIGNIFICANT CHANGE UP (ref 15–37)
BASOPHILS # BLD AUTO: 0.03 K/UL — SIGNIFICANT CHANGE UP (ref 0–0.2)
BASOPHILS NFR BLD AUTO: 0.6 % — SIGNIFICANT CHANGE UP (ref 0–2)
BILIRUB SERPL-MCNC: 0.9 MG/DL — SIGNIFICANT CHANGE UP (ref 0.2–1.2)
BLD GP AB SCN SERPL QL: SIGNIFICANT CHANGE UP
BUN SERPL-MCNC: 17 MG/DL — SIGNIFICANT CHANGE UP (ref 7–23)
CALCIUM SERPL-MCNC: 8.7 MG/DL — SIGNIFICANT CHANGE UP (ref 8.5–10.1)
CHLORIDE SERPL-SCNC: 109 MMOL/L — HIGH (ref 96–108)
CO2 SERPL-SCNC: 28 MMOL/L — SIGNIFICANT CHANGE UP (ref 22–31)
CREAT SERPL-MCNC: 1.22 MG/DL — SIGNIFICANT CHANGE UP (ref 0.5–1.3)
EGFR: 59 ML/MIN/1.73M2 — LOW
EGFR: 59 ML/MIN/1.73M2 — LOW
EOSINOPHIL # BLD AUTO: 0.53 K/UL — HIGH (ref 0–0.5)
EOSINOPHIL NFR BLD AUTO: 11.1 % — HIGH (ref 0–6)
GLUCOSE SERPL-MCNC: 122 MG/DL — HIGH (ref 70–99)
HCT VFR BLD CALC: 34.8 % — LOW (ref 39–50)
HGB BLD-MCNC: 11.7 G/DL — LOW (ref 13–17)
IMM GRANULOCYTES NFR BLD AUTO: 0.2 % — SIGNIFICANT CHANGE UP (ref 0–0.9)
INR BLD: 1.04 RATIO — SIGNIFICANT CHANGE UP (ref 0.85–1.16)
LYMPHOCYTES # BLD AUTO: 1.13 K/UL — SIGNIFICANT CHANGE UP (ref 1–3.3)
LYMPHOCYTES # BLD AUTO: 23.6 % — SIGNIFICANT CHANGE UP (ref 13–44)
MCHC RBC-ENTMCNC: 29.4 PG — SIGNIFICANT CHANGE UP (ref 27–34)
MCHC RBC-ENTMCNC: 33.6 G/DL — SIGNIFICANT CHANGE UP (ref 32–36)
MCV RBC AUTO: 87.4 FL — SIGNIFICANT CHANGE UP (ref 80–100)
MONOCYTES # BLD AUTO: 0.46 K/UL — SIGNIFICANT CHANGE UP (ref 0–0.9)
MONOCYTES NFR BLD AUTO: 9.6 % — SIGNIFICANT CHANGE UP (ref 2–14)
NEUTROPHILS # BLD AUTO: 2.63 K/UL — SIGNIFICANT CHANGE UP (ref 1.8–7.4)
NEUTROPHILS NFR BLD AUTO: 54.9 % — SIGNIFICANT CHANGE UP (ref 43–77)
NRBC BLD AUTO-RTO: 0 /100 WBCS — SIGNIFICANT CHANGE UP (ref 0–0)
PLATELET # BLD AUTO: 153 K/UL — SIGNIFICANT CHANGE UP (ref 150–400)
POTASSIUM SERPL-MCNC: 3.8 MMOL/L — SIGNIFICANT CHANGE UP (ref 3.5–5.3)
POTASSIUM SERPL-SCNC: 3.8 MMOL/L — SIGNIFICANT CHANGE UP (ref 3.5–5.3)
PROT SERPL-MCNC: 6.4 GM/DL — SIGNIFICANT CHANGE UP (ref 6–8.3)
PROTHROM AB SERPL-ACNC: 11.8 SEC — SIGNIFICANT CHANGE UP (ref 9.9–13.4)
RBC # BLD: 3.98 M/UL — LOW (ref 4.2–5.8)
RBC # FLD: 14.1 % — SIGNIFICANT CHANGE UP (ref 10.3–14.5)
SODIUM SERPL-SCNC: 140 MMOL/L — SIGNIFICANT CHANGE UP (ref 135–145)
WBC # BLD: 4.79 K/UL — SIGNIFICANT CHANGE UP (ref 3.8–10.5)
WBC # FLD AUTO: 4.79 K/UL — SIGNIFICANT CHANGE UP (ref 3.8–10.5)

## 2025-03-30 PROCEDURE — 99285 EMERGENCY DEPT VISIT HI MDM: CPT

## 2025-03-30 PROCEDURE — 74174 CTA ABD&PLVS W/CONTRAST: CPT | Mod: 26

## 2025-03-30 PROCEDURE — 99223 1ST HOSP IP/OBS HIGH 75: CPT

## 2025-03-30 RX ORDER — METOPROLOL SUCCINATE 50 MG/1
25 TABLET, EXTENDED RELEASE ORAL DAILY
Refills: 0 | Status: DISCONTINUED | OUTPATIENT
Start: 2025-03-30 | End: 2025-04-01

## 2025-03-30 RX ORDER — ATORVASTATIN CALCIUM 80 MG/1
40 TABLET, FILM COATED ORAL AT BEDTIME
Refills: 0 | Status: DISCONTINUED | OUTPATIENT
Start: 2025-03-30 | End: 2025-04-06

## 2025-03-30 RX ADMIN — METOPROLOL SUCCINATE 25 MILLIGRAM(S): 50 TABLET, EXTENDED RELEASE ORAL at 18:21

## 2025-03-30 RX ADMIN — ATORVASTATIN CALCIUM 40 MILLIGRAM(S): 80 TABLET, FILM COATED ORAL at 22:52

## 2025-03-30 RX ADMIN — Medication 100 MILLILITER(S): at 18:22

## 2025-03-30 RX ADMIN — Medication 40 MILLIGRAM(S): at 18:21

## 2025-03-30 RX ADMIN — Medication 100 MILLILITER(S): at 20:57

## 2025-03-30 NOTE — H&P ADULT - HISTORY OF PRESENT ILLNESS
80-year-old male PMH of CAD with stents on Aspirin and Plavix, hyperlipidemia, diverticulitis, with history of recurrent GI bleeds due to diverticular bleeding, last admitted in our system January 2023 for similar episode of GI bleeding. Patient comes in today for concern of more than 6 episodes of bright red blood per rectum ongoing since yesterday.  Denies any lightheadedness or dizziness.  Reports occasional shortness of breath.  Denies any chest pain.  Denies any abdominal pain.     CT of abdomen notes no active GI bleeding.  80-year-old male PMH of CAD with 2 stents approximately 6 years ago, on Aspirin and Plavix intermittently, hyperlipidemia, diverticulitis, with history of recurrent GI bleeds due to diverticular bleeding, last admitted in our system January 2023 for similar episode of GI bleeding. Patient comes in today for concern of more than 6 episodes of bright red blood per rectum ongoing since yesterday.  Denies any lightheadedness or dizziness.  Reports occasional shortness of breath.  Denies any chest pain.  Denies any abdominal pain. Denies melena.     CT of abdomen notes no active GI bleeding.

## 2025-03-30 NOTE — H&P ADULT - ASSESSMENT
80-year-old male PMH of CAD with 2 stents approximately 6 years ago, on Aspirin and Plavix intermittently, hyperlipidemia, diverticulitis, with history of recurrent GI bleeds due to diverticular bleeding, last admitted in our system January 2023 for similar episode of GI bleeding. Patient comes in today for concern of more than 6 episodes of bright red blood per rectum ongoing since yesterday.  Denies any lightheadedness or dizziness.  Reports occasional shortness of breath.  Denies any chest pain.  Denies any abdominal pain. Denies melena.       Plan:  Admit to medicine for likely LGI bleeding from diverticulosis. HGB acceptable on arrival at 11.7, follow repeat in AM. CT of abdomen notes no active GI bleeding.     Will start clear liquid diet, IVF and IV Protonix daily. Hold ASA for now, last stent was 6 years ago. Last colonoscopy was over 5 years ago.     Continue home meds: Lipitor 40 mg/day, and Toprol 25 mg/day. Hold Lisinoprol 20 mg/day and HCTZ 25 mg per day as BP low in ER     Will ask GI Dr. Bernabe Small to eval in AM for repeat colonoscopy.    80-year-old male PMH of CAD with 2 stents approximately 6 years ago, on Aspirin and Plavix intermittently, hyperlipidemia, diverticulitis, with history of recurrent GI bleeds due to diverticular bleeding, last admitted in our system January 2023 for similar episode of GI bleeding. Patient comes in today for concern of more than 6 episodes of bright red blood per rectum ongoing since yesterday.  Denies any lightheadedness or dizziness.  Reports occasional shortness of breath.  Denies any chest pain.  Denies any abdominal pain. Denies melena.       Plan:  Admit to medicine for likely LGI bleeding from diverticulosis. HGB acceptable on arrival at 11.7, follow repeat in AM. No need for PRBC now.     CT of abdomen notes no active GI bleeding. Will start clear liquid diet, IVF and IV Protonix daily. Hold ASA for now, last stent was 6 years ago. Last colonoscopy was over 5 years ago.     No need for Plavix as last stent was 6 years ago.      Continue home meds: Lipitor 40 mg/day, and Toprol 25 mg/day. Hold Lisinoprol 20 mg/day and HCTZ 25 mg per day as BP low in ER     Will ask GI Dr. Bernabe mSall to pollo in AM for repeat colonoscopy.

## 2025-03-30 NOTE — ED ADULT TRIAGE NOTE - CHIEF COMPLAINT QUOTE
pt c/o bright red blood in his stool since yesterday. denies abdominal pain or diarrhea, history diverticulitis, cardiac stent and htn. takes Plavix and asprin

## 2025-03-30 NOTE — ED ADULT NURSE NOTE - OBJECTIVE STATEMENT
Pt presents to ED A&Ox3 ambulatory with equal steady gait c/o bloody stools x yesterday, pt reports more than 6 episodes of bright red bowel movement. Pt has a reported hx of diverticulitis. Pt reports intermittent SOB with exertion. Pt reports using aspirin and Plavix. Pt denies abd pain, N/V, fever, dizziness or chills.

## 2025-03-30 NOTE — ED PROVIDER NOTE - PHYSICAL EXAMINATION
Gen: aox3, nad,   Head: NCAT  ENT: Airway patent, moist mucous membranes, nasal passageways clear,   Cardiac: Normal rate, normal rhythm   Respiratory: Lungs CTA B/L  Gastrointestinal: Abdomen soft, nontender, nondistended, no rebound, no guarding  Rectal: chaperoned by Sean Patel RN, no hemorrhoids/fissures, scant red tinged stool on exam  MSK: No gross abnormalities, FROM of all four extremities, no edema  HEME: Extremities warm and well perfused    Skin: No rashes, no lesions  Neuro: No gross neurologic deficits

## 2025-03-30 NOTE — ED PROVIDER NOTE - CLINICAL SUMMARY MEDICAL DECISION MAKING FREE TEXT BOX
80-year-old male history of CAD with stents on aspirin and Plavix, hyperlipidemia, diverticulitis, with history of recurrent GI bleeds due to diverticular bleeding, last admitted in our system January 2023 for similar complaint.  Patient comes in today for concern of more than 6 episodes of bright red blood per rectum ongoing since yesterday.  Denies any lightheadedness or dizziness.  Reports occasional shortness of breath.  Denies any chest pain.  Denies any abdominal pain.  Reports sensation of abdominal discomfort like a gnawing sensation/hungry sensation.  Physical exam as above  Plan-patient with history of recurrent GI bleeds, at elevated risk of persistent bleeding and progressive anemia is on antiplatelet agents, will check CT angio to assess for any signs of active bleeding, eval CBC and type and screen, transfuse for hemoglobin less than 8 in setting of CAD.  Will require admission for further monitoring and GI eval

## 2025-03-30 NOTE — PATIENT PROFILE ADULT - FALL HARM RISK - HARM RISK INTERVENTIONS

## 2025-03-30 NOTE — ED ADULT NURSE NOTE - NSFALLHARMRISKINTERV_ED_ALL_ED

## 2025-03-30 NOTE — H&P ADULT - NSHPPHYSICALEXAM_GEN_ALL_CORE
PHYSICAL EXAMINATION:  Vital Signs Last 24 Hrs  T(C): 36.5 (30 Mar 2025 11:12), Max: 36.5 (30 Mar 2025 11:12)  T(F): 97.7 (30 Mar 2025 11:12), Max: 97.7 (30 Mar 2025 11:12)  HR: 18 (30 Mar 2025 11:12) (18 - 18)  BP: 125/72 (30 Mar 2025 11:12) (125/72 - 125/72)  BP(mean): --  RR: 18 (30 Mar 2025 11:12) (18 - 18)  SpO2: 98% (30 Mar 2025 11:12) (98% - 98%)    Parameters below as of 30 Mar 2025 11:12  Patient On (Oxygen Delivery Method): room air      CAPILLARY BLOOD GLUCOSE          GENERAL: NAD,   ENMT: mucous membranes moist  NECK: supple, No JVD  CHEST/LUNG: clear to auscultation bilaterally; no rales, rhonchi, or wheezing b/l  HEART: normal S1, S2  ABDOMEN: BS+, soft, ND, NT   EXTREMITIES:  pulses palpable; no clubbing, cyanosis, or edema b/l LEs  NEURO: awake, alert, interactive; moves all extremities PHYSICAL EXAMINATION:  Vital Signs Last 24 Hrs  T(C): 36.5 (30 Mar 2025 11:12), Max: 36.5 (30 Mar 2025 11:12)  T(F): 97.7 (30 Mar 2025 11:12), Max: 97.7 (30 Mar 2025 11:12)  HR: 18 (30 Mar 2025 11:12) (18 - 18)  BP: 125/72 (30 Mar 2025 11:12) (125/72 - 125/72)  BP(mean): --  RR: 18 (30 Mar 2025 11:12) (18 - 18)  SpO2: 98% (30 Mar 2025 11:12) (98% - 98%)    Parameters below as of 30 Mar 2025 11:12  Patient On (Oxygen Delivery Method): room air      CAPILLARY BLOOD GLUCOSE          GENERAL: NAD, seen in ER, comfortable, no melena, no BRBPR in ER  ENMT: mucous membranes moist  NECK: supple, No JVD  CHEST/LUNG: clear to auscultation bilaterally; no rales, rhonchi, or wheezing b/l  HEART: normal S1, S2  ABDOMEN: BS+, soft, ND, NT   EXTREMITIES:  pulses palpable; no clubbing, cyanosis, or edema b/l LEs  NEURO: awake, alert, interactive; moves all extremities

## 2025-03-30 NOTE — ED PROVIDER NOTE - INPATIENT RESIDENT/ACP NOTIFIED
Nursing Note by Zainab Rai RN at 10/07/18 11:02 AM     Author:  Zainab Rai, RN Service:  (none) Author Type:  Registered Nurse     Filed:  10/07/18 11:02 AM Encounter Date:  10/7/2018 Status:  Signed     :  Zainab Rai RN (Registered Nurse)            Patient taken to room in stable condition.    Name and birthdate verified by patient.    Antoinette Guardado was offered treatment for pain control:[KH1.1T] Yes.  Patient refused comfort measures to reduce pain.[KH1.1M]    Last visit with RADHA PHILLIP was on No match found  Last visit with [facility] was on 01/12/2018 at  5:00 PM in [facility]  Match done based on reference date of today 10/7/18[KH1.1T]            Revision History        User Key Date/Time User Provider Type Action    > KH1.1 10/07/18 11:02 AM Zainab Rai, RN Registered Nurse Sign    M - Manual, T - Template             d/w Dr Sim

## 2025-03-30 NOTE — H&P ADULT - NSHPLABSRESULTS_GEN_ALL_CORE
LABS:                        11.7   4.79  )-----------( 153      ( 30 Mar 2025 11:55 )             34.8     03-30    140  |  109[H]  |  17  ----------------------------<  122[H]  3.8   |  28  |  1.22    Ca    8.7      30 Mar 2025 11:55    TPro  6.4  /  Alb  3.2[L]  /  TBili  0.9  /  DBili  x   /  AST  29  /  ALT  20  /  AlkPhos  64  03-30    PT/INR - ( 30 Mar 2025 11:55 )   PT: 11.8 sec;   INR: 1.04 ratio         PTT - ( 30 Mar 2025 11:55 )  PTT:18.6 sec  Urinalysis Basic - ( 30 Mar 2025 11:55 )    Color: x / Appearance: x / SG: x / pH: x  Gluc: 122 mg/dL / Ketone: x  / Bili: x / Urobili: x   Blood: x / Protein: x / Nitrite: x   Leuk Esterase: x / RBC: x / WBC x   Sq Epi: x / Non Sq Epi: x / Bacteria: x          RADIOLOGY & ADDITIONAL TESTS:

## 2025-03-31 LAB
ALBUMIN SERPL ELPH-MCNC: 3.1 G/DL — LOW (ref 3.3–5)
ALP SERPL-CCNC: 64 U/L — SIGNIFICANT CHANGE UP (ref 40–120)
ALT FLD-CCNC: 20 U/L — SIGNIFICANT CHANGE UP (ref 12–78)
ANION GAP SERPL CALC-SCNC: 6 MMOL/L — SIGNIFICANT CHANGE UP (ref 5–17)
AST SERPL-CCNC: 28 U/L — SIGNIFICANT CHANGE UP (ref 15–37)
BILIRUB SERPL-MCNC: 1.1 MG/DL — SIGNIFICANT CHANGE UP (ref 0.2–1.2)
BUN SERPL-MCNC: 10 MG/DL — SIGNIFICANT CHANGE UP (ref 7–23)
CALCIUM SERPL-MCNC: 8.3 MG/DL — LOW (ref 8.5–10.1)
CHLORIDE SERPL-SCNC: 111 MMOL/L — HIGH (ref 96–108)
CO2 SERPL-SCNC: 26 MMOL/L — SIGNIFICANT CHANGE UP (ref 22–31)
CREAT SERPL-MCNC: 1 MG/DL — SIGNIFICANT CHANGE UP (ref 0.5–1.3)
EGFR: 75 ML/MIN/1.73M2 — SIGNIFICANT CHANGE UP
EGFR: 75 ML/MIN/1.73M2 — SIGNIFICANT CHANGE UP
GLUCOSE SERPL-MCNC: 90 MG/DL — SIGNIFICANT CHANGE UP (ref 70–99)
HCT VFR BLD CALC: 30.1 % — LOW (ref 39–50)
HCT VFR BLD CALC: 33 % — LOW (ref 39–50)
HGB BLD-MCNC: 10.1 G/DL — LOW (ref 13–17)
HGB BLD-MCNC: 10.7 G/DL — LOW (ref 13–17)
MCHC RBC-ENTMCNC: 28.3 PG — SIGNIFICANT CHANGE UP (ref 27–34)
MCHC RBC-ENTMCNC: 29.1 PG — SIGNIFICANT CHANGE UP (ref 27–34)
MCHC RBC-ENTMCNC: 32.4 G/DL — SIGNIFICANT CHANGE UP (ref 32–36)
MCHC RBC-ENTMCNC: 33.6 G/DL — SIGNIFICANT CHANGE UP (ref 32–36)
MCV RBC AUTO: 86.7 FL — SIGNIFICANT CHANGE UP (ref 80–100)
MCV RBC AUTO: 87.3 FL — SIGNIFICANT CHANGE UP (ref 80–100)
NRBC BLD AUTO-RTO: 0 /100 WBCS — SIGNIFICANT CHANGE UP (ref 0–0)
NRBC BLD AUTO-RTO: 0 /100 WBCS — SIGNIFICANT CHANGE UP (ref 0–0)
PLATELET # BLD AUTO: 136 K/UL — LOW (ref 150–400)
PLATELET # BLD AUTO: 145 K/UL — LOW (ref 150–400)
POTASSIUM SERPL-MCNC: 3.6 MMOL/L — SIGNIFICANT CHANGE UP (ref 3.5–5.3)
POTASSIUM SERPL-SCNC: 3.6 MMOL/L — SIGNIFICANT CHANGE UP (ref 3.5–5.3)
PROT SERPL-MCNC: 6.2 GM/DL — SIGNIFICANT CHANGE UP (ref 6–8.3)
RBC # BLD: 3.47 M/UL — LOW (ref 4.2–5.8)
RBC # BLD: 3.78 M/UL — LOW (ref 4.2–5.8)
RBC # FLD: 13.8 % — SIGNIFICANT CHANGE UP (ref 10.3–14.5)
RBC # FLD: 14.1 % — SIGNIFICANT CHANGE UP (ref 10.3–14.5)
SODIUM SERPL-SCNC: 143 MMOL/L — SIGNIFICANT CHANGE UP (ref 135–145)
WBC # BLD: 4.77 K/UL — SIGNIFICANT CHANGE UP (ref 3.8–10.5)
WBC # BLD: 5.3 K/UL — SIGNIFICANT CHANGE UP (ref 3.8–10.5)
WBC # FLD AUTO: 4.77 K/UL — SIGNIFICANT CHANGE UP (ref 3.8–10.5)
WBC # FLD AUTO: 5.3 K/UL — SIGNIFICANT CHANGE UP (ref 3.8–10.5)

## 2025-03-31 PROCEDURE — 99232 SBSQ HOSP IP/OBS MODERATE 35: CPT

## 2025-03-31 RX ORDER — BISACODYL 5 MG
10 TABLET, DELAYED RELEASE (ENTERIC COATED) ORAL ONCE
Refills: 0 | Status: COMPLETED | OUTPATIENT
Start: 2025-03-31 | End: 2025-03-31

## 2025-03-31 RX ORDER — POLYETHYLENE GLYCOL-3350 AND ELECTROLYTES 236; 6.74; 5.86; 2.97; 22.74 G/274.31G; G/274.31G; G/274.31G; G/274.31G; G/274.31G
4000 POWDER, FOR SOLUTION ORAL ONCE
Refills: 0 | Status: COMPLETED | OUTPATIENT
Start: 2025-03-31 | End: 2025-03-31

## 2025-03-31 RX ADMIN — Medication 60 MILLILITER(S): at 10:01

## 2025-03-31 RX ADMIN — Medication 10 MILLIGRAM(S): at 18:58

## 2025-03-31 RX ADMIN — Medication 100 MILLILITER(S): at 06:37

## 2025-03-31 RX ADMIN — ATORVASTATIN CALCIUM 40 MILLIGRAM(S): 80 TABLET, FILM COATED ORAL at 21:32

## 2025-03-31 RX ADMIN — POLYETHYLENE GLYCOL-3350 AND ELECTROLYTES 4000 MILLILITER(S): 236; 6.74; 5.86; 2.97; 22.74 POWDER, FOR SOLUTION ORAL at 17:06

## 2025-03-31 RX ADMIN — Medication 40 MILLIGRAM(S): at 11:35

## 2025-03-31 RX ADMIN — METOPROLOL SUCCINATE 25 MILLIGRAM(S): 50 TABLET, EXTENDED RELEASE ORAL at 05:55

## 2025-03-31 NOTE — PROGRESS NOTE ADULT - ASSESSMENT
80-year-old male PMH of CAD with 2 stents approximately 6 years ago, on Aspirin and Plavix intermittently, hyperlipidemia, diverticulitis, with history of recurrent GI bleeds due to diverticular bleeding, last admitted in our system January 2023 for similar episode of GI bleeding. Patient comes in today for concern of more than 6 episodes of bright red blood per rectum ongoing since yesterday.  Denies any lightheadedness or dizziness.  Reports occasional shortness of breath.  Denies any chest pain.  Denies any abdominal pain. Denies melena.       Plan:  Admit to medicine for likely LGI bleeding from diverticulosis. HGB acceptable on arrival at 11.7, follow repeat in AM. No need for PRBC now.     HGB lower 10.7 today.     CT of abdomen notes no active GI bleeding. Will start clear liquid diet, IVF and IV Protonix daily. Hold ASA for now, last stent was 6 years ago. Last colonoscopy was over 5 years ago.     No need for Plavix as last stent was 6 years ago.      Continue home meds: Lipitor 40 mg/day, and Toprol 25 mg/day. Hold Lisinoprol 20 mg/day and HCTZ 25 mg per day as BP low in ER     Asked GI Dr. Bernabe Small to eval in AM for repeat colonoscopy.    80-year-old male PMH of CAD with 2 stents approximately 6 years ago, on Aspirin and Plavix intermittently, hyperlipidemia, diverticulitis, with history of recurrent GI bleeds due to diverticular bleeding, last admitted in our system January 2023 for similar episode of GI bleeding. Patient comes in today for concern of more than 6 episodes of bright red blood per rectum ongoing since yesterday.  Denies any lightheadedness or dizziness.  Reports occasional shortness of breath.  Denies any chest pain.  Denies any abdominal pain. Denies melena.       Plan:  Admit to medicine for likely LGI bleeding from diverticulosis. HGB acceptable on arrival at 11.7, follow repeat in AM. No need for PRBC now.     HGB lower 10.7 today.     CT of abdomen notes no active GI bleeding. Will start clear liquid diet, IVF and IV Protonix daily. Hold ASA for now, last stent was 6 years ago. Last colonoscopy was over 5 years ago.     No need for Plavix as last stent was 6 years ago.      Continue home meds: Lipitor 40 mg/day, and Toprol 25 mg/day. Hold Lisinoprol 20 mg/day and HCTZ 25 mg per day as BP low in ER     Asked GI Dr. Bernabe Small to eval in AM for repeat colonoscopy here tomorrow if possible.

## 2025-03-31 NOTE — PROGRESS NOTE ADULT - SUBJECTIVE AND OBJECTIVE BOX
INTERVAL HPI/OVERNIGHT EVENTS:  Pt seen and examined at bedside.     Allergies/Intolerance: sulfa drugs (Unknown)      MEDICATIONS  (STANDING):  atorvastatin 40 milliGRAM(s) Oral at bedtime  metoprolol succinate ER 25 milliGRAM(s) Oral daily  pantoprazole  Injectable 40 milliGRAM(s) IV Push daily  sodium chloride 0.9%. 1000 milliLiter(s) (100 mL/Hr) IV Continuous <Continuous>    MEDICATIONS  (PRN):        ROS: all systems reviewed and wnl      PHYSICAL EXAMINATION:  Vital Signs Last 24 Hrs  T(C): 36.6 (31 Mar 2025 05:03), Max: 36.6 (30 Mar 2025 18:12)  T(F): 97.9 (31 Mar 2025 05:03), Max: 97.9 (30 Mar 2025 18:12)  HR: 63 (31 Mar 2025 05:03) (18 - 66)  BP: 120/72 (31 Mar 2025 05:03) (120/72 - 162/75)  BP(mean): --  RR: 18 (31 Mar 2025 05:03) (18 - 18)  SpO2: 98% (31 Mar 2025 05:03) (98% - 100%)    Parameters below as of 31 Mar 2025 05:03  Patient On (Oxygen Delivery Method): room air      CAPILLARY BLOOD GLUCOSE          03-30 @ 07:01  -  03-31 @ 07:00  --------------------------------------------------------  IN: 1100 mL / OUT: 700 mL / NET: 400 mL        GENERAL: stable, comfortable on RA, no melena or BRBPR here  NECK: supple, No JVD  CHEST/LUNG: clear to auscultation bilaterally; no rales, rhonchi, or wheezing b/l  HEART: normal S1, S2  ABDOMEN: BS+, soft, ND, NT   EXTREMITIES:  pulses palpable; no clubbing, cyanosis, or edema b/l LEs    LABS:                        10.7   4.77  )-----------( 145      ( 31 Mar 2025 08:20 )             33.0     03-30    140  |  109[H]  |  17  ----------------------------<  122[H]  3.8   |  28  |  1.22    Ca    8.7      30 Mar 2025 11:55    TPro  6.4  /  Alb  3.2[L]  /  TBili  0.9  /  DBili  x   /  AST  29  /  ALT  20  /  AlkPhos  64  03-30    PT/INR - ( 30 Mar 2025 11:55 )   PT: 11.8 sec;   INR: 1.04 ratio         PTT - ( 30 Mar 2025 11:55 )  PTT:18.6 sec  Urinalysis Basic - ( 30 Mar 2025 11:55 )    Color: x / Appearance: x / SG: x / pH: x  Gluc: 122 mg/dL / Ketone: x  / Bili: x / Urobili: x   Blood: x / Protein: x / Nitrite: x   Leuk Esterase: x / RBC: x / WBC x   Sq Epi: x / Non Sq Epi: x / Bacteria: x

## 2025-04-01 LAB
APTT BLD: 26.1 SEC — SIGNIFICANT CHANGE UP (ref 24.5–35.6)
HCT VFR BLD CALC: 27.2 % — LOW (ref 39–50)
HCT VFR BLD CALC: 27.8 % — LOW (ref 39–50)
HCT VFR BLD CALC: 28.8 % — LOW (ref 39–50)
HGB BLD-MCNC: 8.9 G/DL — LOW (ref 13–17)
HGB BLD-MCNC: 9.2 G/DL — LOW (ref 13–17)
HGB BLD-MCNC: 9.8 G/DL — LOW (ref 13–17)
INR BLD: 1.11 RATIO — SIGNIFICANT CHANGE UP (ref 0.85–1.16)
MCHC RBC-ENTMCNC: 28.8 PG — SIGNIFICANT CHANGE UP (ref 27–34)
MCHC RBC-ENTMCNC: 28.9 PG — SIGNIFICANT CHANGE UP (ref 27–34)
MCHC RBC-ENTMCNC: 29.3 PG — SIGNIFICANT CHANGE UP (ref 27–34)
MCHC RBC-ENTMCNC: 32.7 G/DL — SIGNIFICANT CHANGE UP (ref 32–36)
MCHC RBC-ENTMCNC: 33.1 G/DL — SIGNIFICANT CHANGE UP (ref 32–36)
MCHC RBC-ENTMCNC: 34 G/DL — SIGNIFICANT CHANGE UP (ref 32–36)
MCV RBC AUTO: 86.2 FL — SIGNIFICANT CHANGE UP (ref 80–100)
MCV RBC AUTO: 87.4 FL — SIGNIFICANT CHANGE UP (ref 80–100)
MCV RBC AUTO: 88 FL — SIGNIFICANT CHANGE UP (ref 80–100)
NRBC BLD AUTO-RTO: 0 /100 WBCS — SIGNIFICANT CHANGE UP (ref 0–0)
PLATELET # BLD AUTO: 117 K/UL — LOW (ref 150–400)
PLATELET # BLD AUTO: 119 K/UL — LOW (ref 150–400)
PLATELET # BLD AUTO: 121 K/UL — LOW (ref 150–400)
PROTHROM AB SERPL-ACNC: 12.9 SEC — SIGNIFICANT CHANGE UP (ref 9.9–13.4)
RBC # BLD: 3.09 M/UL — LOW (ref 4.2–5.8)
RBC # BLD: 3.18 M/UL — LOW (ref 4.2–5.8)
RBC # BLD: 3.34 M/UL — LOW (ref 4.2–5.8)
RBC # FLD: 13.9 % — SIGNIFICANT CHANGE UP (ref 10.3–14.5)
RBC # FLD: 14 % — SIGNIFICANT CHANGE UP (ref 10.3–14.5)
RBC # FLD: 14 % — SIGNIFICANT CHANGE UP (ref 10.3–14.5)
WBC # BLD: 4.36 K/UL — SIGNIFICANT CHANGE UP (ref 3.8–10.5)
WBC # BLD: 4.48 K/UL — SIGNIFICANT CHANGE UP (ref 3.8–10.5)
WBC # BLD: 5.33 K/UL — SIGNIFICANT CHANGE UP (ref 3.8–10.5)
WBC # FLD AUTO: 4.36 K/UL — SIGNIFICANT CHANGE UP (ref 3.8–10.5)
WBC # FLD AUTO: 4.48 K/UL — SIGNIFICANT CHANGE UP (ref 3.8–10.5)
WBC # FLD AUTO: 5.33 K/UL — SIGNIFICANT CHANGE UP (ref 3.8–10.5)

## 2025-04-01 PROCEDURE — 99223 1ST HOSP IP/OBS HIGH 75: CPT

## 2025-04-01 PROCEDURE — 74174 CTA ABD&PLVS W/CONTRAST: CPT | Mod: 26

## 2025-04-01 PROCEDURE — 99232 SBSQ HOSP IP/OBS MODERATE 35: CPT

## 2025-04-01 PROCEDURE — 99291 CRITICAL CARE FIRST HOUR: CPT

## 2025-04-01 RX ORDER — SODIUM CHLORIDE 9 G/1000ML
1000 INJECTION, SOLUTION INTRAVENOUS
Refills: 0 | Status: DISCONTINUED | OUTPATIENT
Start: 2025-04-01 | End: 2025-04-01

## 2025-04-01 RX ADMIN — SODIUM CHLORIDE 125 MILLILITER(S): 9 INJECTION, SOLUTION INTRAVENOUS at 17:33

## 2025-04-01 RX ADMIN — ATORVASTATIN CALCIUM 40 MILLIGRAM(S): 80 TABLET, FILM COATED ORAL at 21:28

## 2025-04-01 RX ADMIN — Medication 60 MILLILITER(S): at 06:08

## 2025-04-01 RX ADMIN — Medication 1 APPLICATION(S): at 21:27

## 2025-04-01 RX ADMIN — Medication 40 MILLIGRAM(S): at 13:02

## 2025-04-01 NOTE — CONSULT NOTE ADULT - NS ATTEST RISK PROBLEM GEN_ALL_CORE FT
Kieran: I have seen and examined the patient face to face, have reviewed and addended the HPI, PE and a/p as necessary.     81 yo M with CAD with 2 stents 6 years ago on Aspirin and plavix, hyperlipidemia, diverticulitis, with history of recurrent GI bleeds due to diverticular bleeding, last admitted in our system January 2023 for similar episode of GI bleeding a/w bright red blood per rectum on 3/30.  Pt noted to have 6 episodes with CT angio negative for active bleeding.  Pt noted to have colonoscopy with Dr. Small today.  Noted to have large blood in R colon from suspected diverticula however unable to visualize or treat source.  Hgb now noted to be  8.9. Pt ordered 2uprbc and ICU consulted for active bleeding.     Patient seen and examined in PACU.  No episodes of BRBPR since completion of colonoscopy.  Pt in NAD.  Denies chest pain, shortness of breath, abdominal pain, dizziness, lightheadedness.  ICU team ordered CT angio to localized bleeding.    Reports Aspirin and Plavix use, denies warfarin/DOAC use.     GEN - NAD; well appearing; A+O x3; non-toxic appearing  CARD -s1s2, RRR, no M,G,R;   PULM - CTA b/l, symmetric breath sounds;   ABD -  +BS, ND, NT, soft, no guarding, no rebound, no masses;   BACK - no CVA tenderness, Normal  spine;   EXT - symmetric pulses, 2+ dp, capillary refill < 2 seconds, no cyanosis, no edema;   NEURO - no focal neuro deficits, no slurred speech        CT angio Kieran: I have seen and examined the patient face to face, have reviewed and addended the HPI, PE and a/p as necessary.     81 yo M with CAD with 2 stents 6 years ago on Aspirin and plavix, hyperlipidemia, diverticulitis, with history of recurrent GI bleeds due to diverticular bleeding, last admitted in our system January 2023 for similar episode of GI bleeding a/w bright red blood per rectum on 3/30.  Pt noted to have 6 episodes with CT angio negative for active bleeding.  Pt noted to have colonoscopy with Dr. Small today.  Noted to have large blood in R colon from suspected diverticula however unable to visualize or treat source.  Hgb now noted to be  8.9. Pt ordered 2uprbc and ICU consulted for active bleeding.     Patient seen and examined in PACU.  No episodes of BRBPR since completion of colonoscopy.  Pt in NAD.  Denies chest pain, shortness of breath, abdominal pain, dizziness, lightheadedness.  ICU team ordered CT angio to localized bleeding.    Reports Aspirin and Plavix use, denies warfarin/DOAC use.     GEN - NAD; well appearing; A+O x3; non-toxic appearing  CARD -s1s2, RRR, no M,G,R;   PULM - CTA b/l, symmetric breath sounds;   ABD -  +BS, ND, NT, soft, no guarding, no rebound, no masses;   BACK - no CVA tenderness, Normal  spine;   EXT - symmetric pulses, 2+ dp, capillary refill < 2 seconds, no cyanosis, no edema;   NEURO - no focal neuro deficits, no slurred speech    Hemoglobin Trend: 8.9 g/dL (04-01-25 @ 14:05)  9.2 g/dL (04-01-25 @ 07:15)  10.1 g/dL (03-31-25 @ 21:45)  10.7 g/dL (03-31-25 @ 08:20)    Hematocrit Trend:   27.2 % (04-01-25 @ 14:05)  27.8 % (04-01-25 @ 07:15)  30.1 % (03-31-25 @ 21:45)  33.0 % (03-31-25 @ 08:20)    < from: CT Angio Abdomen and Pelvis w/ IV Cont (04.01.25 @ 16:59) >      ACC: [id number] EXAM:  CT ANGIO ABD PELV (W)AW IC   ORDERED BY: ZORAIDA IYER     PROCEDURE DATE:  04/01/2025          INTERPRETATION:  CLINICAL INFORMATION: Active lower GI bleed    COMPARISON: CT abdomen pelvis 3/30/2025.    CONTRAST/COMPLICATIONS:  IV Contrast: Omnipaque 350  90 cc administered   10 cc discarded  Oral Contrast: NONE  .    PROCEDURE:  CT of the Abdomen and Pelvis was performed.  Precontrast, Arterial and Delayed phases were performed.  Sagittal and coronal reformats were performed.    FINDINGS:  LOWER CHEST: Coronary artery stent. Aortic valve calcification.    LIVER: Stable ill-defined 2.4 cm right hepatic dome hypoattenuating   lesion, previously identified as hemangioma. Intrahepatic portosystemic   shunt.  BILE DUCTS: Normal caliber.  GALLBLADDER: Within normal limits.  SPLEEN: Within normal limits.  PANCREAS: Within normal limits.  ADRENALS: A 1.5 cm right adrenal adenoma.  KIDNEYS/URETERS: Bilateral renal subcentimeter low dense foci, too small   to characterize.No hydronephrosis. Symmetric renal enhancement.    BLADDER: Within normal limits.  REPRODUCTIVE ORGANS: Prostate within normal limits.    BOWEL: No bowel obstruction. Diffuse colonic diverticulosis. No evidence   of GI bleed. Normal appendix. Nonobstructive appendicolith measuring 0.3   cm.  PERITONEUM/RETROPERITONEUM: Within normal limits.  VESSELS: Atherosclerotic changes.  LYMPH NODES: No lymphadenopathy.  ABDOMINAL WALL: Within normal limits.  BONES: Degenerative changes.    IMPRESSION:  No acute GI bleed.  Diffuse colonic diverticulosis.        --- End of Report ---      MIHAI JACKSON MD  This document has been electronically signed. Apr 1 2025  5:21PM    < end of copied text >    A/P

## 2025-04-01 NOTE — PROGRESS NOTE ADULT - SUBJECTIVE AND OBJECTIVE BOX
see colonoscopy report    Imp: LGI Bleed; Diverticulosis (L and R colon) --> bleeding appears to be coming from R sided diverticulosis    Plan: Transfuse 1u prbc's; IR consult for possible embolization; CBC q4h; consider transfer to ICU. Case discussed with Dr Harrington

## 2025-04-01 NOTE — PROGRESS NOTE ADULT - ASSESSMENT
80-year-old male       h/o   CAD with 2 stents .  6 years ago, on  asa/ plavix   intermittently, HTN/  HLD,   diverticulitis,      h/o   recurrent   gi  bleed/ suspect    diverticular bleeding, last admitted  janu 2023 for similar   bleeding     admitted  with   6 episodes  brbpr,   /denies  lightheadedness . sob /cp  /  abdominal pain./  melena.      anemia    from   acute    gi    gi  bleeding from diverticulosis.      hb has  drifted   downwards   CT a/p,   active   go  bleed      ass  on hold    CAD, , stent was 6 years ago.     colonoscopy was over 5 years ago.    HTN/ HLD        Lipitor,  Toprol /  and  hctz/ lisinopril ah s been  held  by team     gi  dr dee,    for  colonoscopy     hb  9 today. follwo cbc in am    dvt ppx/ pas   total  encounter time   35  minutes        ad< from: CT Angio Abdomen and Pelvis w/ IV Cont (03.30.25 @ 14:17) >  IMPRESSION:  No CT evidence of active gastrointestinal bleeding.  --- End of Report ---  < end of copied text >       rad< from: TTE Echo Complete w/o Contrast w/ Doppler (04.25.22 @ 17:11) >  Summary:   1. Left ventricular ejection fraction, by visual estimation, is 55 to   60%.   2. Normal global left ventricular systolic function.   3. Mild mitral valve regurgitation.   4. Mild tricuspid regurgitation.   5. Mild aortic regurgitation.   6. Sclerotic aortic valve with normal opening.   7. Mild pulmonic valve regurgitation.   8. Estimated pulmonary artery systolic pressure is 35.7 mmHg assuming a   right atrial pressure of 5 mmHg, which is consistent with mild pulmonary   hypertension  < end of copied text >   80-year-old male       h/o   CAD with 2 stents .  6 years ago, on  asa/ plavix   intermittently, HTN/  HLD,   diverticulitis,      h/o   recurrent   gi  bleed/ suspect    diverticular bleeding, last admitted  janu 2023 for similar   bleeding     admitted  with   6 episodes  brbpr,   /denies  lightheadedness . sob /cp  /  abdominal pain./  melena.      anemia    from   acute    gi    gi  bleeding from diverticulosis.      hb has  drifted   downwards   CT a/p,   active   go  bleed      ass  on hold    CAD, , stent was 6 years ago.     colonoscopy was over 5 years ago.    HTN/ HLD        Lipitor,  Toprol /  and  hctz/ lisinopril ah s been  held  by team     gi  dr dee,    for  colonoscopy   today/ pt ha  brbpr  this  am    hb  9 today. follow  cbc     dvt ppx/ pas   total  encounter time   35  minutes        ad< from: CT Angio Abdomen and Pelvis w/ IV Cont (03.30.25 @ 14:17) >  IMPRESSION:  No CT evidence of active gastrointestinal bleeding.  --- End of Report ---  < end of copied text >       rad< from: TTE Echo Complete w/o Contrast w/ Doppler (04.25.22 @ 17:11) >  Summary:   1. Left ventricular ejection fraction, by visual estimation, is 55 to   60%.   2. Normal global left ventricular systolic function.   3. Mild mitral valve regurgitation.   4. Mild tricuspid regurgitation.   5. Mild aortic regurgitation.   6. Sclerotic aortic valve with normal opening.   7. Mild pulmonic valve regurgitation.   8. Estimated pulmonary artery systolic pressure is 35.7 mmHg assuming a   right atrial pressure of 5 mmHg, which is consistent with mild pulmonary   hypertension  < end of copied text >

## 2025-04-01 NOTE — CONSULT NOTE ADULT - ASSESSMENT
80-year-old male PMH of CAD with 2 stents approximately 6 years ago, on Aspirin and Plavix intermittently, hyperlipidemia, diverticulitis, with history of recurrent GI bleeds due to diverticular bleeding admitted for LGIB. Now s/p colonoscopy with active R colonic bleed without identifiable source. ICU consulted for active bleeding.    recs:  - pt currently HD stable without bleeding  - f/u post transfusion cbc   - get stat CTA to eval for active bleed    note incomplete 80-year-old male PMH of CAD with 2 stents approximately 6 years ago, on Aspirin and Plavix intermittently, hyperlipidemia, diverticulitis, with history of recurrent GI bleeds due to diverticular bleeding admitted for LGIB. Now s/p colonoscopy with active R colonic bleed without identifiable source. ICU consulted for active bleeding.    recs:  - LGIB with diverticular bleeding suspected from R colon during colonoscopy without identifiable source   - pt currently HD stable without bleeding since procedure  - currently getting prbc. f/u post transfusion cbc and trend cbc q6hr.  - maintain large bore PIV access x2 and maintain Hb>7  - stat CTA without active bleed for IR to intervene on, but can consult for evaluation/to have on board in case rebleeds  - cont to hold AC  - f/u GI recs  - currently has no icu needs. Please reconsult if pt becomes hypotensive or has recurrent significant LGIB or any other questions     d/w dr rdz and medicine ACP 80-year-old male PMH of CAD with 2 stents approximately 6 years ago, on Aspirin and Plavix intermittently, hyperlipidemia, diverticulitis, with history of recurrent GI bleeds due to diverticular bleeding admitted for LGIB. Now s/p colonoscopy with active R colonic bleed without identifiable source. ICU consulted for active bleeding.    recs:  - LGIB with diverticular bleeding suspected from R colon during colonoscopy without identifiable source   - pt currently HD stable without bleeding since procedure  - currently getting prbc. f/u post transfusion cbc and trend cbc q6hr.  - maintain large bore PIV access x2 and maintain Hb>7  - stat CTA without active bleed for IR to intervene on, but can consult for evaluation/to have on board in case rebleeds  - cont to hold AC  - f/u GI recs  - currently has no icu needs. Please reconsult if pt becomes hypotensive or has recurrent significant LGIB or any other questions     d/w dr rdz and medicine ACP    ================================================  Update 1816  Kieran  Patient noted to have acute bloody bowel movement after CT angio  - CT angio with no active source of bleeding  - clarified with patient that he last took aspirin and plavix 1 week ago because of recurrent bleeding  - transfuse 2 units prbc (increase rate to over 1 hour); trend cbc q6H  - Surgery evaluation/consult  - Admit to ICU for ongoing care

## 2025-04-01 NOTE — CONSULT NOTE ADULT - ASSESSMENT
79 Y/O male with PMHx of HTN, HLD, CAD s/p 2 stent placement on ASA/Plavix, recurrent GI bleeds admitted with LGIB. Underwent colonoscopy with Dr. Small on 4/1 and found to have right-sided diverticular bleed. Upgraded to CCU, currently receiving 2u PRBCs. Pending IR embolization on Wednesday.     PLAN:    - NO acute surgical intervention at this time; will continue to follow   - F/U IR embolization   - Trend H/H; transfuse PRN; serial CBC Q4hrs   - Hold ASA/Plavix per primary team   - Continue care per CCU   - Discussed with Dr. Yin

## 2025-04-01 NOTE — CONSULT NOTE ADULT - CRITICAL CARE ATTENDING COMMENT
Kieran: I have seen and examined the patient face to face, have reviewed and addended the HPI, PE and a/p as necessary.     79 yo M with CAD with 2 stents 6 years ago on Aspirin and plavix, hyperlipidemia, diverticulitis, with history of recurrent GI bleeds due to diverticular bleeding, last admitted in our system January 2023 for similar episode of GI bleeding a/w bright red blood per rectum on 3/30.  Pt noted to have 6 episodes with CT angio negative for active bleeding.  Pt noted to have colonoscopy with Dr. Small today.  Noted to have large blood in R colon from suspected diverticula however unable to visualize or treat source.  Hgb now noted to be  8.9. Pt ordered 2uprbc and ICU consulted for active bleeding.     Patient seen and examined in PACU.  No episodes of BRBPR since completion of colonoscopy.  Pt in NAD.  Denies chest pain, shortness of breath, abdominal pain, dizziness, lightheadedness.  ICU team ordered CT angio to localized bleeding.    Reports Aspirin and Plavix use, denies warfarin/DOAC use.     GEN - NAD; well appearing; A+O x3; non-toxic appearing  CARD -s1s2, RRR, no M,G,R;   PULM - CTA b/l, symmetric breath sounds;   ABD -  +BS, ND, NT, soft, no guarding, no rebound, no masses;   BACK - no CVA tenderness, Normal  spine;   EXT - symmetric pulses, 2+ dp, capillary refill < 2 seconds, no cyanosis, no edema;   NEURO - no focal neuro deficits, no slurred speech    Hemoglobin Trend: 8.9 g/dL (04-01-25 @ 14:05)  9.2 g/dL (04-01-25 @ 07:15)  10.1 g/dL (03-31-25 @ 21:45)  10.7 g/dL (03-31-25 @ 08:20)    Hematocrit Trend:   27.2 % (04-01-25 @ 14:05)  27.8 % (04-01-25 @ 07:15)  30.1 % (03-31-25 @ 21:45)  33.0 % (03-31-25 @ 08:20)    < from: CT Angio Abdomen and Pelvis w/ IV Cont (04.01.25 @ 16:59) >  ACC: [id number] EXAM:  CT ANGIO ABD PELV (W)AW IC   ORDERED BY: ZORAIDA IYER     PROCEDURE DATE:  04/01/2025      INTERPRETATION:  CLINICAL INFORMATION: Active lower GI bleed    COMPARISON: CT abdomen pelvis 3/30/2025.    CONTRAST/COMPLICATIONS:  IV Contrast: Omnipaque 350  90 cc administered   10 cc discarded  Oral Contrast: NONE.    PROCEDURE:  CT of the Abdomen and Pelvis was performed. Precontrast, Arterial and Delayed phases were performed. Sagittal and coronal reformats were performed.    FINDINGS:  LOWER CHEST: Coronary artery stent. Aortic valve calcification.    LIVER: Stable ill-defined 2.4 cm right hepatic dome hypoattenuating lesion, previously identified as hemangioma. Intrahepatic portosystemic   shunt.  BILE DUCTS: Normal caliber.  GALLBLADDER: Within normal limits.  SPLEEN: Within normal limits.  PANCREAS: Within normal limits.  ADRENALS: A 1.5 cm right adrenal adenoma.  KIDNEYS/URETERS: Bilateral renal subcentimeter low dense foci, too small to characterize. No hydronephrosis. Symmetric renal enhancement.  BLADDER: Within normal limits.  REPRODUCTIVE ORGANS: Prostate within normal limits.  BOWEL: No bowel obstruction. Diffuse colonic diverticulosis. No evidence of GI bleed. Normal appendix. Nonobstructive appendicolith measuring 0.3 cm.  PERITONEUM/RETROPERITONEUM: Within normal limits.  VESSELS: Atherosclerotic changes.  LYMPH NODES: No lymphadenopathy.  ABDOMINAL WALL: Within normal limits.  BONES: Degenerative changes.    IMPRESSION:  No acute GI bleed. Diffuse colonic diverticulosis.    --- End of Report ---    MIHAI JACKSON MD  This document has been electronically signed. Apr 1 2025  5:21PM    < end of copied text >    A/P    79 yo M with CAD with 2 stents approximately 6 years ago, on Aspirin and Plavix intermittently, hyperlipidemia, diverticulitis, with history of recurrent GI bleeds due to diverticular bleeding admitted for LGIB. Now s/p colonoscopy with active R colonic bleed without identifiable source. ICU consulted for active bleeding.    Patient noted to have large bloody bowel movement after CT Angio, which was negative.      Patient remains HD stable, though now with bleeding.    - C/w transfusing 2 units PRBC.   -  f/u post transfusion cbc and trend cbc q6hr.  - maintain large bore PIV access x2 and maintain Hb>7  - Place a-line to trend BPs.   - CTA without active bleed for IR to intervene on; will consult surgery  - cont to hold AC  - f/u GI recs    Admit to ICU for ongoing evaluation and active bleeding.

## 2025-04-01 NOTE — PROGRESS NOTE ADULT - SUBJECTIVE AND OBJECTIVE BOX
date of service: 04-01-25 @ 12:11  [facility]  REVIEW OF SYSTEMS:  CONSTITUTIONAL: No fever,  no  weight loss  ENT:  No  tinnitus,   no   vertigo  NECK: No pain or stiffness  RESPIRATORY: No cough, wheezing, chills or hemoptysis;    No Shortness of Breath  CARDIOVASCULAR: No chest pain, palpitations, dizziness  GASTROINTESTINAL: No abdominal or epigastric pain. No nausea, vomiting, or hematemesis; No diarrhea  No melena or hematochezia.  GENITOURINARY: No dysuria, frequency, hematuria, or incontinence  NEUROLOGICAL: No headaches  SKIN: No itching,  no   rash  LYMPH Nodes: No enlarged glands  ENDOCRINE: No heat or cold intolerance  MUSCULOSKELETAL: No joint pain or swelling  PSYCHIATRIC: No depression, anxiety  HEME/LYMPH: No easy bruising, or bleeding gums  ALLERGY AND IMMUNOLOGIC: No hives or eczema	    MEDICATIONS  (STANDING):  atorvastatin 40 milliGRAM(s) Oral at bedtime  metoprolol succinate ER 25 milliGRAM(s) Oral daily  pantoprazole  Injectable 40 milliGRAM(s) IV Push daily  sodium chloride 0.9%. 1000 milliLiter(s) (60 mL/Hr) IV Continuous <Continuous>    MEDICATIONS  (PRN):      Vital Signs Last 24 Hrs  T(C): 36.6 (01 Apr 2025 10:50), Max: 36.8 (31 Mar 2025 17:10)  T(F): 97.8 (01 Apr 2025 10:50), Max: 98.2 (31 Mar 2025 17:10)  HR: 67 (01 Apr 2025 10:50) (64 - 70)  BP: 126/74 (01 Apr 2025 10:50) (109/62 - 150/71)  BP(mean): --  RR: 17 (01 Apr 2025 10:50) (17 - 18)  SpO2: 99% (01 Apr 2025 10:50) (97% - 99%)    Parameters below as of 01 Apr 2025 10:50  Patient On (Oxygen Delivery Method): room air      CAPILLARY BLOOD GLUCOSE        I&O's Summary    31 Mar 2025 07:01  -  01 Apr 2025 07:00  --------------------------------------------------------  IN: 1520 mL / OUT: 0 mL / NET: 1520 mL          Appearance: Normal	  HEENT:   Normal oral mucosa, PERRL, EOMI	  Lymphatic: No lymphadenopathy  Cardiovascular: Normal S1 S2, No JVD  Respiratory: Lungs clear to auscultation	  Gastrointestinal:  Soft, Non-tender, + BS	  Skin: No rash, No ecchymoses	  Extremities:     LABS:                        9.2    4.48  )-----------( 119      ( 01 Apr 2025 07:15 )             27.8     03-31    143  |  111[H]  |  10  ----------------------------<  90  3.6   |  26  |  1.00    Ca    8.3[L]      31 Mar 2025 21:45    TPro  6.2  /  Alb  3.1[L]  /  TBili  1.1  /  DBili  x   /  AST  28  /  ALT  20  /  AlkPhos  64  03-31          Urinalysis Basic - ( 31 Mar 2025 21:45 )    Color: x / Appearance: x / SG: x / pH: x  Gluc: 90 mg/dL / Ketone: x  / Bili: x / Urobili: x   Blood: x / Protein: x / Nitrite: x   Leuk Esterase: x / RBC: x / WBC x   Sq Epi: x / Non Sq Epi: x / Bacteria: x                      Consultant(s) Notes Reviewed:      Care Discussed with Consultants/Other Providers:

## 2025-04-01 NOTE — PRE-OP CHECKLIST - LAST TOOK
clears full note to follow    plan for [facility] tomorrow 58year old male PMH htn, Smoker x 30 years, and CAD presenting for elevated LFTs. For the last couple weeks pt has had cough, difficulty breathing while lying down, and feeling of abdominal bloating;  went to his PCP and was told his LFTs were elevated and if feeling sick to go to hospital.  Patient states he had  chest pain 2 years, presented to [facility] and had a C with stents, unknown vessel.  Patient states he can walk up 5 blocks and can go up 2 flights of stairs with SOB.    Patient states he did not follow up with Doctor.  Patient has a medication list with brilinta on it.  Patient told ED that he is on it, but states to me he was only taking aspirin.  Patient + for social ETOH.  Hepatitis panel outpatient was negative.

## 2025-04-02 LAB
ANION GAP SERPL CALC-SCNC: 6 MMOL/L — SIGNIFICANT CHANGE UP (ref 5–17)
BUN SERPL-MCNC: 9 MG/DL — SIGNIFICANT CHANGE UP (ref 7–23)
CALCIUM SERPL-MCNC: 7.9 MG/DL — LOW (ref 8.5–10.1)
CHLORIDE SERPL-SCNC: 112 MMOL/L — HIGH (ref 96–108)
CO2 SERPL-SCNC: 24 MMOL/L — SIGNIFICANT CHANGE UP (ref 22–31)
CREAT SERPL-MCNC: 0.9 MG/DL — SIGNIFICANT CHANGE UP (ref 0.5–1.3)
EGFR: 85 ML/MIN/1.73M2 — SIGNIFICANT CHANGE UP
EGFR: 85 ML/MIN/1.73M2 — SIGNIFICANT CHANGE UP
FIBRINOGEN PPP-MCNC: 190 MG/DL — LOW (ref 200–480)
GLUCOSE SERPL-MCNC: 102 MG/DL — HIGH (ref 70–99)
HCT VFR BLD CALC: 23 % — LOW (ref 39–50)
HCT VFR BLD CALC: 24.3 % — LOW (ref 39–50)
HCT VFR BLD CALC: 26.4 % — LOW (ref 39–50)
HCT VFR BLD CALC: 26.7 % — LOW (ref 39–50)
HGB BLD-MCNC: 8.1 G/DL — LOW (ref 13–17)
HGB BLD-MCNC: 8.1 G/DL — LOW (ref 13–17)
HGB BLD-MCNC: 8.8 G/DL — LOW (ref 13–17)
HGB BLD-MCNC: 9.2 G/DL — LOW (ref 13–17)
MAGNESIUM SERPL-MCNC: 1.7 MG/DL — SIGNIFICANT CHANGE UP (ref 1.6–2.6)
MCHC RBC-ENTMCNC: 29.2 PG — SIGNIFICANT CHANGE UP (ref 27–34)
MCHC RBC-ENTMCNC: 29.9 PG — SIGNIFICANT CHANGE UP (ref 27–34)
MCHC RBC-ENTMCNC: 33.3 G/DL — SIGNIFICANT CHANGE UP (ref 32–36)
MCHC RBC-ENTMCNC: 33.3 G/DL — SIGNIFICANT CHANGE UP (ref 32–36)
MCHC RBC-ENTMCNC: 34.5 G/DL — SIGNIFICANT CHANGE UP (ref 32–36)
MCHC RBC-ENTMCNC: 35.2 G/DL — SIGNIFICANT CHANGE UP (ref 32–36)
MCV RBC AUTO: 84.8 FL — SIGNIFICANT CHANGE UP (ref 80–100)
MCV RBC AUTO: 84.9 FL — SIGNIFICANT CHANGE UP (ref 80–100)
MCV RBC AUTO: 87.7 FL — SIGNIFICANT CHANGE UP (ref 80–100)
MCV RBC AUTO: 87.7 FL — SIGNIFICANT CHANGE UP (ref 80–100)
NRBC BLD AUTO-RTO: 0 /100 WBCS — SIGNIFICANT CHANGE UP (ref 0–0)
PHOSPHATE SERPL-MCNC: 3.6 MG/DL — SIGNIFICANT CHANGE UP (ref 2.5–4.5)
PLATELET # BLD AUTO: 116 K/UL — LOW (ref 150–400)
PLATELET # BLD AUTO: 123 K/UL — LOW (ref 150–400)
PLATELET # BLD AUTO: 124 K/UL — LOW (ref 150–400)
PLATELET # BLD AUTO: 130 K/UL — LOW (ref 150–400)
POTASSIUM SERPL-MCNC: 3.3 MMOL/L — LOW (ref 3.5–5.3)
POTASSIUM SERPL-SCNC: 3.3 MMOL/L — LOW (ref 3.5–5.3)
RBC # BLD: 2.71 M/UL — LOW (ref 4.2–5.8)
RBC # BLD: 2.77 M/UL — LOW (ref 4.2–5.8)
RBC # BLD: 3.01 M/UL — LOW (ref 4.2–5.8)
RBC # BLD: 3.15 M/UL — LOW (ref 4.2–5.8)
RBC # FLD: 13.9 % — SIGNIFICANT CHANGE UP (ref 10.3–14.5)
RBC # FLD: 14.1 % — SIGNIFICANT CHANGE UP (ref 10.3–14.5)
SODIUM SERPL-SCNC: 142 MMOL/L — SIGNIFICANT CHANGE UP (ref 135–145)
WBC # BLD: 6.02 K/UL — SIGNIFICANT CHANGE UP (ref 3.8–10.5)
WBC # BLD: 6.3 K/UL — SIGNIFICANT CHANGE UP (ref 3.8–10.5)
WBC # BLD: 7.72 K/UL — SIGNIFICANT CHANGE UP (ref 3.8–10.5)
WBC # BLD: 8.05 K/UL — SIGNIFICANT CHANGE UP (ref 3.8–10.5)
WBC # FLD AUTO: 6.02 K/UL — SIGNIFICANT CHANGE UP (ref 3.8–10.5)
WBC # FLD AUTO: 6.3 K/UL — SIGNIFICANT CHANGE UP (ref 3.8–10.5)
WBC # FLD AUTO: 7.72 K/UL — SIGNIFICANT CHANGE UP (ref 3.8–10.5)
WBC # FLD AUTO: 8.05 K/UL — SIGNIFICANT CHANGE UP (ref 3.8–10.5)

## 2025-04-02 PROCEDURE — 99232 SBSQ HOSP IP/OBS MODERATE 35: CPT | Mod: FS

## 2025-04-02 PROCEDURE — 99233 SBSQ HOSP IP/OBS HIGH 50: CPT

## 2025-04-02 RX ORDER — MAGNESIUM SULFATE 500 MG/ML
2 SYRINGE (ML) INJECTION ONCE
Refills: 0 | Status: COMPLETED | OUTPATIENT
Start: 2025-04-02 | End: 2025-04-02

## 2025-04-02 RX ADMIN — Medication 100 MILLIEQUIVALENT(S): at 04:42

## 2025-04-02 RX ADMIN — Medication 100 MILLIEQUIVALENT(S): at 05:40

## 2025-04-02 RX ADMIN — Medication 100 MILLIEQUIVALENT(S): at 03:56

## 2025-04-02 RX ADMIN — Medication 1 APPLICATION(S): at 07:45

## 2025-04-02 RX ADMIN — ATORVASTATIN CALCIUM 40 MILLIGRAM(S): 80 TABLET, FILM COATED ORAL at 21:10

## 2025-04-02 RX ADMIN — Medication 40 MILLIGRAM(S): at 07:45

## 2025-04-02 RX ADMIN — Medication 25 GRAM(S): at 03:56

## 2025-04-02 NOTE — PROGRESS NOTE ADULT - SUBJECTIVE AND OBJECTIVE BOX
Pt received 1u prbc's yesterday  Stools now dark  Hgb 8.1      MEDICATIONS  (STANDING):  atorvastatin 40 milliGRAM(s) Oral at bedtime  chlorhexidine 2% Cloths 1 Application(s) Topical daily    MEDICATIONS  (PRN):      Allergies    sulfa drugs (Unknown)    Intolerances        Vital Signs Last 24 Hrs  T(C): 36.7 (02 Apr 2025 15:46), Max: 36.7 (02 Apr 2025 15:46)  T(F): 98.1 (02 Apr 2025 15:46), Max: 98.1 (02 Apr 2025 15:46)  HR: 77 (02 Apr 2025 17:00) (60 - 118)  BP: 148/82 (02 Apr 2025 17:00) (86/58 - 148/82)  BP(mean): 101 (02 Apr 2025 17:00) (61 - 197)  RR: 16 (02 Apr 2025 17:00) (13 - 23)  SpO2: 100% (02 Apr 2025 17:00) (99% - 100%)    Parameters below as of 02 Apr 2025 07:30  Patient On (Oxygen Delivery Method): room air        PHYSICAL EXAM:  General: NAD.  CVS: S1, S2  Chest: air entry bilaterally present  Abd: BS present, soft, non-tender      LABS:                        8.1    8.05  )-----------( 123      ( 02 Apr 2025 14:30 )             24.3     04-02    142  |  112[H]  |  9   ----------------------------<  102[H]  3.3[L]   |  24  |  0.90    Ca    7.9[L]      02 Apr 2025 01:55  Phos  3.6     04-02  Mg     1.7     04-02    TPro  6.2  /  Alb  3.1[L]  /  TBili  1.1  /  DBili  x   /  AST  28  /  ALT  20  /  AlkPhos  64  03-31    PT/INR - ( 01 Apr 2025 20:15 )   PT: 12.9 sec;   INR: 1.11 ratio         PTT - ( 01 Apr 2025 20:15 )  PTT:26.1 sec    continue clear liquids  CBC in AM  start protonix 40mg PO daily           Pt received 1u prbc's yesterday  Stools now dark  Hgb 8.1      MEDICATIONS  (STANDING):  atorvastatin 40 milliGRAM(s) Oral at bedtime  chlorhexidine 2% Cloths 1 Application(s) Topical daily    MEDICATIONS  (PRN):      Allergies    sulfa drugs (Unknown)    Intolerances        Vital Signs Last 24 Hrs  T(C): 36.7 (02 Apr 2025 15:46), Max: 36.7 (02 Apr 2025 15:46)  T(F): 98.1 (02 Apr 2025 15:46), Max: 98.1 (02 Apr 2025 15:46)  HR: 77 (02 Apr 2025 17:00) (60 - 118)  BP: 148/82 (02 Apr 2025 17:00) (86/58 - 148/82)  BP(mean): 101 (02 Apr 2025 17:00) (61 - 197)  RR: 16 (02 Apr 2025 17:00) (13 - 23)  SpO2: 100% (02 Apr 2025 17:00) (99% - 100%)    Parameters below as of 02 Apr 2025 07:30  Patient On (Oxygen Delivery Method): room air        PHYSICAL EXAM:  General: NAD.  CVS: S1, S2  Chest: air entry bilaterally present  Abd: BS present, soft, non-tender      LABS:                        8.1    8.05  )-----------( 123      ( 02 Apr 2025 14:30 )             24.3     04-02    142  |  112[H]  |  9   ----------------------------<  102[H]  3.3[L]   |  24  |  0.90    Ca    7.9[L]      02 Apr 2025 01:55  Phos  3.6     04-02  Mg     1.7     04-02    TPro  6.2  /  Alb  3.1[L]  /  TBili  1.1  /  DBili  x   /  AST  28  /  ALT  20  /  AlkPhos  64  03-31    PT/INR - ( 01 Apr 2025 20:15 )   PT: 12.9 sec;   INR: 1.11 ratio         PTT - ( 01 Apr 2025 20:15 )  PTT:26.1 sec    continue clear liquids  CBC in AM  continue protonix 40mg PO daily

## 2025-04-02 NOTE — CONSULT NOTE ADULT - SUBJECTIVE AND OBJECTIVE BOX
Chief Complaint:  Patient is a 80y old  Male who presents with a chief complaint of GI bleed (01 Apr 2025 16:29)      HPI:  80-year-old male PMH of CAD with 2 stents approximately 6 years ago, on Aspirin and Plavix intermittently, hyperlipidemia, diverticulitis, with history of recurrent GI bleeds due to diverticular bleeding, last admitted in our system January 2023 for similar episode of GI bleeding. Patient comes in today for concern of more than 6 episodes of bright red blood per rectum ongoing since yesterday.  Denies any lightheadedness or dizziness.  Reports occasional shortness of breath.  Denies any chest pain.  Denies any abdominal pain. Denies melena.     CT of abdomen notes no active GI bleeding.  (30 Mar 2025 17:00)    Interval Hx:     81 Y/O male with PMHx of HTN, HLD, CAD s/p 2 stent placement on ASA/Plavix, recurrent GI bleeds admitted with LGIB. Patient presented with >6 episodes of BRBPR. Underwent colonoscopy today and found to have right-sided diverticular bleed. Upgraded to CCU, currently receiving 2u PRBCs. Pending IR embolization on Wednesday. Denies chest pain, SOB, N/V, calf tenderness.       PMH/PSH:PAST MEDICAL & SURGICAL HISTORY:  Hypertension      Diverticulosis      CAD (coronary artery disease)      Hernia, inguinal, right  1990          Allergies:  sulfa drugs (Unknown)      Medications:  atorvastatin 40 milliGRAM(s) Oral at bedtime  chlorhexidine 2% Cloths 1 Application(s) Topical daily  pantoprazole  Injectable 40 milliGRAM(s) IV Push daily      Review of Systems:  Negative except for HPI    Relevant Family History:   FAMILY HISTORY:  FH: HTN (hypertension)          Physical Exam:    Vital Signs:  Vital Signs Last 24 Hrs  T(C): 36.6 (01 Apr 2025 17:35), Max: 36.6 (01 Apr 2025 03:25)  T(F): 97.9 (01 Apr 2025 17:35), Max: 97.9 (01 Apr 2025 03:25)  HR: 72 (01 Apr 2025 18:00) (63 - 118)  BP: 131/70 (01 Apr 2025 18:00) (92/50 - 161/64)  BP(mean): 90 (01 Apr 2025 18:00) (62 - 197)  RR: 21 (01 Apr 2025 18:00) (17 - 22)  SpO2: 100% (01 Apr 2025 18:00) (97% - 100%)    Parameters below as of 01 Apr 2025 16:02  Patient On (Oxygen Delivery Method): room air      Daily     Daily     General:  Appears stated age, no distress  HEENT:  NC/AT,  conjunctivae clear and pink  Chest:  Full & symmetric excursion, no increased effort  Cardiovascular:  Regular rhythm  Abdomen:  Soft, non tender, non distended  Extremities:  No cyanosis, clubbing or edema  Skin:  No rash/erythema/ecchymoses  Neuro/Psych:  Alert, oriented, no asterixis, no tremor, no encephalopathy    Laboratory:                          8.9    4.36  )-----------( 121      ( 01 Apr 2025 14:05 )             27.2     03-31    143  |  111[H]  |  10  ----------------------------<  90  3.6   |  26  |  1.00    Ca    8.3[L]      31 Mar 2025 21:45    TPro  6.2  /  Alb  3.1[L]  /  TBili  1.1  /  DBili  x   /  AST  28  /  ALT  20  /  AlkPhos  64  03-31    LIVER FUNCTIONS - ( 31 Mar 2025 21:45 )  Alb: 3.1 g/dL / Pro: 6.2 gm/dL / ALK PHOS: 64 U/L / ALT: 20 U/L / AST: 28 U/L / GGT: x             Urinalysis Basic - ( 31 Mar 2025 21:45 )    Color: x / Appearance: x / SG: x / pH: x  Gluc: 90 mg/dL / Ketone: x  / Bili: x / Urobili: x   Blood: x / Protein: x / Nitrite: x   Leuk Esterase: x / RBC: x / WBC x   Sq Epi: x / Non Sq Epi: x / Bacteria: x          Intake and Output    03-31-25 @ 07:01  -  04-01-25 @ 07:00  --------------------------------------------------------  IN: 1520 mL / OUT: 0 mL / NET: 1520 mL    04-01-25 @ 07:01  -  04-01-25 @ 18:36  --------------------------------------------------------  IN: 0 mL / OUT: 200 mL / NET: -200 mL        
Interventional Radiology    Evaluate for Procedure:     HPI: 80y Male with PMH of CAD with 2 stents approximately 6 years ago, on Aspirin and Plavix intermittently, hyperlipidemia, diverticulitis, with history of recurrent GI bleeds due to diverticular bleeding, last admitted in our system January 2023 for similar episode of GI bleeding. Patient comes in 3/30 for concern of more than 6 episodes of bright red blood per rectum. CTA negative for active bleeding. Pt was admitted to medicine with plan for colonoscopy with GI. Initial Hb 11 but drifted to ~9. s/p colonoscopy yesterday with Dr Small who reported large amount of bright red blood in the colon which he surmises was coming from some bleeding diverticula in the right colon but was unable to visualize source nor treat. Hb drifted to 8.9. 1u PRBC given and pt upgraded to ICU. IR consulted for angio/embo.       Allergies: sulfa drugs (Unknown)    Medications (Abx/Cardiac/Anticoagulation/Blood Products)      Data:    64.9  T(C): 36.2  HR: 88  BP: 90/58  RR: 14  SpO2: 100%    -WBC 6.30 / HgB 8.8 / Hct 26.4 / Plt 124  -Na 142 / Cl 112 / BUN 9 / Glucose 102  -K 3.3 / CO2 24 / Cr 0.90  -ALT -- / Alk Phos -- / T.Bili --  -INR 1.11 / PTT 26.1    Radiology: Reviewed with Dr. Bridges    Assessment/Plan:   -80y Male with PMH of CAD with 2 stents approximately 6 years ago, on Aspirin and Plavix intermittently, hyperlipidemia, diverticulitis, with history of recurrent GI bleeds due to diverticular bleeding, last admitted in our system January 2023 for similar episode of GI bleeding. Patient comes in 3/30 for concern of more than 6 episodes of bright red blood per rectum. CTA negative for active bleeding. Pt was admitted to medicine with plan for colonoscopy with GI. Initial Hb 11 but drifted to ~9. s/p colonoscopy yesterday with Dr Small who reported large amount of bright red blood in the colon which he surmises was coming from some bleeding diverticula in the right colon but was unable to visualize source nor treat. Hb drifted to 8.9. 1u PRBC given and pt upgraded to ICU. IR consulted for angio/embo.       - case and imaging reviewed with Dr. Bridges, CTA negative yesterday. No immediate IR Intervention.   - Consider repeat CTA if concern for continued bleeding, to further localize source of bleeding.   - Tolerating clears now, continue to monitor for repeat episodes, trend serial CBCs, continue transfusions as needed  - No IR coverage in-house tomorrow, limited anesthesia availability on Friday. If patient requires intervention, recommend transfer to [facility]  - d/w ICU      Interventional Radiology  ------------------------------------  For emergent consults, please call [phone], or call or message on TEAMs.   For non-emergent consults, consults after hours or over the weekend, please place IR Consult order. 
CHIEF COMPLAINT: BRBPR    HPI:  80-year-old male PMH of CAD with 2 stents approximately 6 years ago, on Aspirin and Plavix intermittently, hyperlipidemia, diverticulitis, with history of recurrent GI bleeds due to diverticular bleeding, last admitted in our system January 2023 for similar episode of GI bleeding. Patient comes in 3/30 for concern of more than 6 episodes of bright red blood per rectum. CTA negative for active bleeding. Pt was admitted to medicine with plan for colonoscopy with GI. Initial Hb 11 but drifted to ~9  this AM. Today pt had colonoscopy with dr dee who reported large amount of bright red blood in the colon which he surmises was coming from some bleeding diverticula in the right colon but was unable to visualize source nor treat. Hb drifted to 8.9. Pt ordered 2uprbc and ICU consulted for active bleeding.     Subjective: PT seen and examined at bedside. Pt awake and alert in NAD. Pt denies any lightheadedness, dizziness, chest pain, sob, abd pain, n/v or any current BRBPR. Pt denies requiring IR intervention in the past. Pt denies having any hematochezia since arrival to PACU.   He was on DAPT at home but denies any DOAC or coumadin use.   VS: HR 67 satting 100% on RA with BP 100s/50s    PAST MEDICAL & SURGICAL HISTORY:  Hypertension      Diverticulosis      CAD (coronary artery disease)      Hernia, inguinal, right  1990          FAMILY HISTORY:  FH: HTN (hypertension)        SOCIAL HISTORY:  Smoking: denies   EtOH Use: denies   Marital Status:  Occupation:  Recent Travel: denies   Country of Birth:  Advance Directives:    Allergies    sulfa drugs (Unknown)    Intolerances        HOME MEDICATIONS:    REVIEW OF SYSTEMS:    [x ] All other systems negative  [ ] Unable to assess ROS because ________    OBJECTIVE:  ICU Vital Signs Last 24 Hrs  T(C): 36.6 (01 Apr 2025 16:02), Max: 36.8 (31 Mar 2025 17:10)  T(F): 97.8 (01 Apr 2025 16:02), Max: 98.2 (31 Mar 2025 17:10)  HR: 66 (01 Apr 2025 16:18) (63 - 72)  BP: 106/86 (01 Apr 2025 16:18) (92/50 - 160/80)  BP(mean): 93 (01 Apr 2025 16:18) (62 - 93)  ABP: --  ABP(mean): --  RR: 19 (01 Apr 2025 16:18) (17 - 20)  SpO2: 100% (01 Apr 2025 16:18) (97% - 100%)    O2 Parameters below as of 01 Apr 2025 16:02  Patient On (Oxygen Delivery Method): room air              03-31 @ 07:01  -  04-01 @ 07:00  --------------------------------------------------------  IN: 1520 mL / OUT: 0 mL / NET: 1520 mL      CAPILLARY BLOOD GLUCOSE          PHYSICAL EXAM:  GENERAL: NAD, lying in bed comfortably  HEAD:  Atraumatic, normocephalic  EYES: EOMI, PERRL  NECK: Supple, trachea midline, no JVD  HEART: Regular rate and rhythm  LUNGS: Unlabored respirations.  Clear to auscultation bilaterally, no crackles, wheezing, or rhonchi  ABDOMEN: Soft, nontender, nondistended  EXTREMITIES: warm, no LE edema  NERVOUS SYSTEM:  A&Ox3, moving all extremities, no focal deficits         HOSPITAL MEDICATIONS:  MEDICATIONS  (STANDING):  atorvastatin 40 milliGRAM(s) Oral at bedtime  lactated ringers. 1000 milliLiter(s) (125 mL/Hr) IV Continuous <Continuous>  pantoprazole  Injectable 40 milliGRAM(s) IV Push daily  sodium chloride 0.9%. 1000 milliLiter(s) (60 mL/Hr) IV Continuous <Continuous>    MEDICATIONS  (PRN):      LABS:                        8.9    4.36  )-----------( 121      ( 01 Apr 2025 14:05 )             27.2     03-31    143  |  111[H]  |  10  ----------------------------<  90  3.6   |  26  |  1.00    Ca    8.3[L]      31 Mar 2025 21:45    TPro  6.2  /  Alb  3.1[L]  /  TBili  1.1  /  DBili  x   /  AST  28  /  ALT  20  /  AlkPhos  64  03-31      Urinalysis Basic - ( 31 Mar 2025 21:45 )    Color: x / Appearance: x / SG: x / pH: x  Gluc: 90 mg/dL / Ketone: x  / Bili: x / Urobili: x   Blood: x / Protein: x / Nitrite: x   Leuk Esterase: x / RBC: x / WBC x   Sq Epi: x / Non Sq Epi: x / Bacteria: x            MICROBIOLOGY: reviewed     RADIOLOGY:  [x ] Reviewed and interpreted by me    EKG: reviewed 
full consult dictated    Plan:  81 yo M with multiple medical problems including ASHD, hyperlipidemia, diverticulitis and recurrent GI bleeding secondary to diverticulosis. Last admitted in 1/2023 for similar episode. Pt is also s/p 2 stents approximately 6 years ago for CAD and is, on ASA and Plavix. Pt admitted after 6 episodes of BRBPR x 1-2 days. No CP, SOB; no melena or abd pain. CT of abdomen is negatie for GI bleeding. Pt on clear liquids; tentatively scheduled for colonoscopy tomorrow. Colon prep ordered. Follow CBC. Hold ASA and plavix.

## 2025-04-02 NOTE — PROGRESS NOTE ADULT - ASSESSMENT
Pt is an 81 yo M with h/o CAD with 2 stents 6 years ago on Aspirin and Plavix, HLD,  diverticulitis, with h/o recurrent LGIB due to diverticular bleeding, last admitted in our system January 2023 for similar episode of LGIB/ BRBPR on 3/30.  Pt noted to have 6 episodes with CT angio negative for active bleeding. 4/1 s/p colonoscopy: large blood in R colon from suspected diverticula however unable to visualize or treat source.  4/1 s/p CTA abd/pelvis: No acute GI bleed. Diffuse colonic diverticulosis. ICU dx: Acute blood loss anemia 2 to LGIB (diverticular bleed). This while standing up pt felt dizzy with a drop in BP    CVS: Mild vasovagal rxn this am/ Trend BP  HEME: DVT prophylaxis with Venodynes/ Trend Hb  FEN: ? Clears as pt became symptomatic this am/ Replace K; pt hypokalemic  GI: F/u as per GI, IR and Surg  Endo: Follow Glu  Social: Pt to remain in the ICU symptomatic 2 to LGIB  Pt is an 81 yo M with h/o CAD with 2 stents 6 years ago on Aspirin and Plavix, HLD,  diverticulitis, with h/o recurrent LGIB due to diverticular bleeding, last admitted in our system January 2023 for similar episode of LGIB/ BRBPR on 3/30.  Pt noted to have 6 episodes with CT angio negative for active bleeding. 4/1 s/p colonoscopy: large blood in R colon from suspected diverticula however unable to visualize or treat source.  4/1 s/p CTA abd/pelvis: No acute GI bleed. Diffuse colonic diverticulosis. ICU dx: Acute blood loss anemia 2 to LGIB (diverticular bleed). This am while standing up pt felt dizzy with a drop in BP    CVS: Mild vasovagal rxn this am/ Trend BP  HEME: DVT prophylaxis with Venodynes/ Trend Hb  FEN: ? Clears as pt became symptomatic this am/ Replace K; pt hypokalemic  GI: F/u as per GI, IR and Surg  Endo: Follow Glu  Social: Pt to remain in the ICU symptomatic 2 to LGIB

## 2025-04-02 NOTE — PHARMACOTHERAPY INTERVENTION NOTE - COMMENTS
Per policy, pantoprazole 40 mg IVP daily transitioned to oral formulation since the patient is tolerating feeding and/or other medications enterally.

## 2025-04-02 NOTE — PROGRESS NOTE ADULT - ASSESSMENT
81 Y/O male with PMHx of HTN, HLD, CAD s/p 2 stent placement on ASA/Plavix, recurrent GI bleeds admitted with LGIB. Underwent colonoscopy with Dr. Small on 4/1 and found to have right-sided diverticular bleed. Upgraded to CCU, received 1u PRBC overnight. Pending IR embolization today.     PLAN:    - NO acute surgical intervention at this time; will continue to follow   - F/U IR embolization   - Trend H/H; transfuse PRN; serial CBC Q4hrs   - Hold ASA/Plavix per primary team   - Continue care per CCU   - Discussed with Dr. Yin

## 2025-04-02 NOTE — CONSULT NOTE ADULT - REASON FOR ADMISSION
GI bleed, BRBPR.
Received message from patient's nurse stating:    Hgb 6.9         Discussion / orders:    Per GI provider documentation, he recommends to \"maintain hgb of 6-8 g/dL but not higher to maintain lower oncotic pressure. \"    · Will hold off on blood transfusion at this time. Please note that this note was dictated using Dragon computer voice recognition software. Quite often unanticipated grammatical, syntax, homophones, and other interpretive errors are inadvertently transcribed by the computer software. Please disregard these errors. Please excuse any errors that have escaped final proofreading.
GI bleed, BRBPR.
GI bleed, BRBPR.
GI bleed

## 2025-04-02 NOTE — PROGRESS NOTE ADULT - SUBJECTIVE AND OBJECTIVE BOX
HPI:  Pt is an 81 yo M with h/o CAD with 2 stents 6 years ago on Aspirin and Plavix, HLD,  diverticulitis, with h/o recurrent LGIB due to diverticular bleeding, last admitted in our system 2023 for similar episode of LGIB/ BRBPR on 3/30.  Pt noted to have 6 episodes with CT angio negative for active bleeding.  s/p colonoscopy: large blood in R colon from suspected diverticula however unable to visualize or treat source.   s/p CTA abd/pelvis: No acute GI bleed. Diffuse colonic diverticulosis. ICU dx: Acute blood loss anemia 2 to LGIB (diverticular bleed). This while standing up pt felt dizzy with a drop in BP      ## Labs:  CBC:                        8.8    6.30  )-----------( 124      ( 2025 07:50 )             26.4     Chem:  04-    142  |  112[H]  |  9   ----------------------------<  102[H]  3.3[L]   |  24  |  0.90    Ca    7.9[L]      2025 01:55  Phos  3.6     04-02  Mg     1.7     04-02    TPro  6.2  /  Alb  3.1[L]  /  TBili  1.1  /  DBili  x   /  AST  28  /  ALT  20  /  AlkPhos  64  03-31    Coags:  PT/INR - ( 2025 20:15 )   PT: 12.9 sec;   INR: 1.11 ratio         PTT - ( 2025 20:15 )  PTT:26.1 sec        ## Imaging:    ## Medications:        atorvastatin 40 milliGRAM(s) Oral at bedtime            ## Vitals:  T(C): 36.2 (25 @ 07:30), Max: 36.6 (25 @ 10:50)  HR: 60 (25 @ 09:15) (60 - 118)  BP: 130/49 (25 @ 09:15) (86/58 - 161/64)  BP(mean): 74 (25 @ 09:15) (62 - 197)  RR: 15 (25 @ 09:15) (13 - 23)  SpO2: 100% (25 @ 09:15) (99% - 100%)  Wt(kg): --  Vent:   AB-01 @ 07:01  -   @ 07:00  --------------------------------------------------------  IN: 936 mL / OUT: 600 mL / NET: 336 mL     @ 07:01  -   @ 10:12  --------------------------------------------------------  IN: 0 mL / OUT: 200 mL / NET: -200 mL          ## P/E:  Gen: sitting comfortably in chair in no apparent distress  Lungs: CTA  Heart: RRR  Abd: Soft/+BS/ Non-tender  Ext: No edema  Neuro: Awake, alert    CENTRAL LINE: [ ] YES [ ] NO  LOCATION:   DATE INSERTED:  REMOVE: [ ] YES [ ] NO      [place]: [ ] YES [ ] NO    DATE INSERTED:  REMOVE:  [ ] YES [ ] NO      A-LINE:  [ ] YES [ ] NO  LOCATION:   DATE INSERTED:  REMOVE:  [ ] YES [ ] NO  EXPLAIN:      CODE STATUS: [x ] full code  [ ] DNR  [ ] DNI  [ ] [facility]  Goals of care discussion: [ ] yes  HPI:  Pt is an 79 yo M with h/o CAD with 2 stents 6 years ago on Aspirin and Plavix, HLD,  diverticulitis, with h/o recurrent LGIB due to diverticular bleeding, last admitted in our system 2023 for similar episode of LGIB/ BRBPR on 3/30.  Pt noted to have 6 episodes with CT angio negative for active bleeding.  s/p colonoscopy: large blood in R colon from suspected diverticula however unable to visualize or treat source.   s/p CTA abd/pelvis: No acute GI bleed. Diffuse colonic diverticulosis. ICU dx: Acute blood loss anemia 2 to LGIB (diverticular bleed). This am while standing up pt felt dizzy with a drop in BP      ## Labs:  CBC:                        8.8    6.30  )-----------( 124      ( 2025 07:50 )             26.4     Chem:  04-02    142  |  112[H]  |  9   ----------------------------<  102[H]  3.3[L]   |  24  |  0.90    Ca    7.9[L]      2025 01:55  Phos  3.6     04-02  Mg     1.7     04-02    TPro  6.2  /  Alb  3.1[L]  /  TBili  1.1  /  DBili  x   /  AST  28  /  ALT  20  /  AlkPhos  64  03-31    Coags:  PT/INR - ( 2025 20:15 )   PT: 12.9 sec;   INR: 1.11 ratio         PTT - ( 2025 20:15 )  PTT:26.1 sec        ## Imaging:    ## Medications:        atorvastatin 40 milliGRAM(s) Oral at bedtime            ## Vitals:  T(C): 36.2 (25 @ 07:30), Max: 36.6 (25 @ 10:50)  HR: 60 (25 @ 09:15) (60 - 118)  BP: 130/49 (25 @ 09:15) (86/58 - 161/64)  BP(mean): 74 (25 @ 09:15) (62 - 197)  RR: 15 (25 @ 09:15) ( - 23)  SpO2: 100% (25 @ 09:15) (99% - 100%)  Wt(kg): --  Vent:   AB-01 @ 07:01  -   @ 07:00  --------------------------------------------------------  IN: 936 mL / OUT: 600 mL / NET: 336 mL     @ 07:01  -   @ 10:12  --------------------------------------------------------  IN: 0 mL / OUT: 200 mL / NET: -200 mL          ## P/E:  Gen: sitting comfortably in chair in no apparent distress  Lungs: CTA  Heart: RRR  Abd: Soft/+BS/ Non-tender  Ext: No edema  Neuro: Awake, alert    CENTRAL LINE: [ ] YES [ ] NO  LOCATION:   DATE INSERTED:  REMOVE: [ ] YES [ ] NO      [place]: [ ] YES [ ] NO    DATE INSERTED:  REMOVE:  [ ] YES [ ] NO      A-LINE:  [ ] YES [ ] NO  LOCATION:   DATE INSERTED:  REMOVE:  [ ] YES [ ] NO  EXPLAIN:      CODE STATUS: [x ] full code  [ ] DNR  [ ] DNI  [ ] [facility]  Goals of care discussion: [ ] yes

## 2025-04-02 NOTE — PROGRESS NOTE ADULT - SUBJECTIVE AND OBJECTIVE BOX
Patient seen and examined at bedside in CCU with Dr. Yin offering no complaints.   Tolerating clear liquid diet.  Admits to 2 episodes of melena overnight.   Denies pain, nausea/ vomiting, chills, SOB, chest pain, calf tenderness.          Vital Signs Last 24 Hrs  T(F): 97 (04-02-25 @ 11:00), Max: 97.9 (04-01-25 @ 15:00)  HR: 76 (04-02-25 @ 13:00)  BP: 105/44 (04-02-25 @ 13:00)  RR: 18 (04-02-25 @ 13:00)  SpO2: 100% (04-02-25 @ 13:00)  Wt(kg): --   CAPILLARY BLOOD GLUCOSE          GENERAL: Alert, NAD  CHEST/LUNG: Respirations non labored, good inspiratory effort  HEART: S1S2  HEENT: NCAT, EOMI, conjunctiva clear   ABDOMEN: Nondistended, + bowel sounds, nontender  EXTREMITIES:  No calf tenderness, no edema    I&O's Detail    01 Apr 2025 07:01  -  02 Apr 2025 07:00  --------------------------------------------------------  IN:    IV PiggyBack: 350 mL    Lactated Ringers: 250 mL    PRBCs (Packed Red Blood Cells): 336 mL  Total IN: 936 mL    OUT:    Voided (mL): 600 mL  Total OUT: 600 mL    Total NET: 336 mL      02 Apr 2025 07:01  -  02 Apr 2025 13:21  --------------------------------------------------------  IN:  Total IN: 0 mL    OUT:    Voided (mL): 200 mL  Total OUT: 200 mL    Total NET: -200 mL          LABS:                        8.8    6.30  )-----------( 124      ( 02 Apr 2025 07:50 )             26.4     04-02    142  |  112[H]  |  9   ----------------------------<  102[H]  3.3[L]   |  24  |  0.90    Ca    7.9[L]      02 Apr 2025 01:55  Phos  3.6     04-02  Mg     1.7     04-02    TPro  6.2  /  Alb  3.1[L]  /  TBili  1.1  /  DBili  x   /  AST  28  /  ALT  20  /  AlkPhos  64  03-31    PT/INR - ( 01 Apr 2025 20:15 )   PT: 12.9 sec;   INR: 1.11 ratio         PTT - ( 01 Apr 2025 20:15 )  PTT:26.1 sec

## 2025-04-03 LAB
ALBUMIN SERPL ELPH-MCNC: 2.3 G/DL — LOW (ref 3.3–5)
ALP SERPL-CCNC: 47 U/L — SIGNIFICANT CHANGE UP (ref 40–120)
ALT FLD-CCNC: 17 U/L — SIGNIFICANT CHANGE UP (ref 12–78)
ANION GAP SERPL CALC-SCNC: 4 MMOL/L — LOW (ref 5–17)
AST SERPL-CCNC: 20 U/L — SIGNIFICANT CHANGE UP (ref 15–37)
BASOPHILS # BLD AUTO: 0.02 K/UL — SIGNIFICANT CHANGE UP (ref 0–0.2)
BASOPHILS NFR BLD AUTO: 0.3 % — SIGNIFICANT CHANGE UP (ref 0–2)
BILIRUB SERPL-MCNC: 1 MG/DL — SIGNIFICANT CHANGE UP (ref 0.2–1.2)
BLD GP AB SCN SERPL QL: SIGNIFICANT CHANGE UP
BUN SERPL-MCNC: 10 MG/DL — SIGNIFICANT CHANGE UP (ref 7–23)
CALCIUM SERPL-MCNC: 7.9 MG/DL — LOW (ref 8.5–10.1)
CHLORIDE SERPL-SCNC: 111 MMOL/L — HIGH (ref 96–108)
CO2 SERPL-SCNC: 26 MMOL/L — SIGNIFICANT CHANGE UP (ref 22–31)
CREAT SERPL-MCNC: 0.92 MG/DL — SIGNIFICANT CHANGE UP (ref 0.5–1.3)
EGFR: 83 ML/MIN/1.73M2 — SIGNIFICANT CHANGE UP
EGFR: 83 ML/MIN/1.73M2 — SIGNIFICANT CHANGE UP
EOSINOPHIL # BLD AUTO: 0.56 K/UL — HIGH (ref 0–0.5)
EOSINOPHIL NFR BLD AUTO: 8.7 % — HIGH (ref 0–6)
GLUCOSE SERPL-MCNC: 94 MG/DL — SIGNIFICANT CHANGE UP (ref 70–99)
HCT VFR BLD CALC: 21.1 % — LOW (ref 39–50)
HCT VFR BLD CALC: 21.3 % — LOW (ref 39–50)
HCT VFR BLD CALC: 23.5 % — LOW (ref 39–50)
HGB BLD-MCNC: 7.2 G/DL — LOW (ref 13–17)
HGB BLD-MCNC: 7.4 G/DL — LOW (ref 13–17)
HGB BLD-MCNC: 8 G/DL — LOW (ref 13–17)
IMM GRANULOCYTES NFR BLD AUTO: 0.2 % — SIGNIFICANT CHANGE UP (ref 0–0.9)
LYMPHOCYTES # BLD AUTO: 1.19 K/UL — SIGNIFICANT CHANGE UP (ref 1–3.3)
LYMPHOCYTES # BLD AUTO: 18.6 % — SIGNIFICANT CHANGE UP (ref 13–44)
MAGNESIUM SERPL-MCNC: 1.9 MG/DL — SIGNIFICANT CHANGE UP (ref 1.6–2.6)
MCHC RBC-ENTMCNC: 29.5 PG — SIGNIFICANT CHANGE UP (ref 27–34)
MCHC RBC-ENTMCNC: 29.6 PG — SIGNIFICANT CHANGE UP (ref 27–34)
MCHC RBC-ENTMCNC: 29.6 PG — SIGNIFICANT CHANGE UP (ref 27–34)
MCHC RBC-ENTMCNC: 34 G/DL — SIGNIFICANT CHANGE UP (ref 32–36)
MCHC RBC-ENTMCNC: 34.1 G/DL — SIGNIFICANT CHANGE UP (ref 32–36)
MCHC RBC-ENTMCNC: 34.7 G/DL — SIGNIFICANT CHANGE UP (ref 32–36)
MCV RBC AUTO: 85.2 FL — SIGNIFICANT CHANGE UP (ref 80–100)
MCV RBC AUTO: 86.7 FL — SIGNIFICANT CHANGE UP (ref 80–100)
MCV RBC AUTO: 86.8 FL — SIGNIFICANT CHANGE UP (ref 80–100)
MONOCYTES # BLD AUTO: 0.7 K/UL — SIGNIFICANT CHANGE UP (ref 0–0.9)
MONOCYTES NFR BLD AUTO: 10.9 % — SIGNIFICANT CHANGE UP (ref 2–14)
NEUTROPHILS # BLD AUTO: 3.93 K/UL — SIGNIFICANT CHANGE UP (ref 1.8–7.4)
NEUTROPHILS NFR BLD AUTO: 61.3 % — SIGNIFICANT CHANGE UP (ref 43–77)
NRBC BLD AUTO-RTO: 0 /100 WBCS — SIGNIFICANT CHANGE UP (ref 0–0)
PHOSPHATE SERPL-MCNC: 2.5 MG/DL — SIGNIFICANT CHANGE UP (ref 2.5–4.5)
PLATELET # BLD AUTO: 108 K/UL — LOW (ref 150–400)
PLATELET # BLD AUTO: 114 K/UL — LOW (ref 150–400)
PLATELET # BLD AUTO: 131 K/UL — LOW (ref 150–400)
POTASSIUM SERPL-MCNC: 3.4 MMOL/L — LOW (ref 3.5–5.3)
POTASSIUM SERPL-SCNC: 3.4 MMOL/L — LOW (ref 3.5–5.3)
PROT SERPL-MCNC: 4.7 GM/DL — LOW (ref 6–8.3)
RBC # BLD: 2.43 M/UL — LOW (ref 4.2–5.8)
RBC # BLD: 2.5 M/UL — LOW (ref 4.2–5.8)
RBC # BLD: 2.71 M/UL — LOW (ref 4.2–5.8)
RBC # FLD: 14.1 % — SIGNIFICANT CHANGE UP (ref 10.3–14.5)
RBC # FLD: 14.2 % — SIGNIFICANT CHANGE UP (ref 10.3–14.5)
RBC # FLD: 14.3 % — SIGNIFICANT CHANGE UP (ref 10.3–14.5)
SODIUM SERPL-SCNC: 141 MMOL/L — SIGNIFICANT CHANGE UP (ref 135–145)
WBC # BLD: 4.76 K/UL — SIGNIFICANT CHANGE UP (ref 3.8–10.5)
WBC # BLD: 6.04 K/UL — SIGNIFICANT CHANGE UP (ref 3.8–10.5)
WBC # BLD: 6.41 K/UL — SIGNIFICANT CHANGE UP (ref 3.8–10.5)
WBC # FLD AUTO: 4.76 K/UL — SIGNIFICANT CHANGE UP (ref 3.8–10.5)
WBC # FLD AUTO: 6.04 K/UL — SIGNIFICANT CHANGE UP (ref 3.8–10.5)
WBC # FLD AUTO: 6.41 K/UL — SIGNIFICANT CHANGE UP (ref 3.8–10.5)

## 2025-04-03 PROCEDURE — 99233 SBSQ HOSP IP/OBS HIGH 50: CPT

## 2025-04-03 RX ADMIN — Medication 100 MILLIEQUIVALENT(S): at 07:02

## 2025-04-03 RX ADMIN — Medication 40 MILLIGRAM(S): at 11:10

## 2025-04-03 RX ADMIN — Medication 100 MILLIEQUIVALENT(S): at 06:13

## 2025-04-03 RX ADMIN — Medication 100 MILLIEQUIVALENT(S): at 05:24

## 2025-04-03 RX ADMIN — Medication 1 APPLICATION(S): at 11:09

## 2025-04-03 RX ADMIN — ATORVASTATIN CALCIUM 40 MILLIGRAM(S): 80 TABLET, FILM COATED ORAL at 21:20

## 2025-04-03 NOTE — DIETITIAN INITIAL EVALUATION ADULT - PERTINENT MEDS FT
MEDICATIONS  (STANDING):  atorvastatin 40 milliGRAM(s) Oral at bedtime  chlorhexidine 2% Cloths 1 Application(s) Topical daily  pantoprazole   Suspension 40 milliGRAM(s) Oral daily    MEDICATIONS  (PRN):

## 2025-04-03 NOTE — PROGRESS NOTE ADULT - SUBJECTIVE AND OBJECTIVE BOX
Patient seen and examined with Dr. Hitesh Martines bedside in CCU resting comfortably.  No complaints offered. Last BM was last night, dark in color.   Denies nausea and vomiting. Tolerating clear liquid diet.  Denies chest pain, dyspnea, cough.    T(F): 98.2 (04-03-25 @ 11:31), Max: 98.2 (04-02-25 @ 20:00)  HR: 82 (04-03-25 @ 12:00) (62 - 88)  BP: 133/55 (04-03-25 @ 12:00) (90/58 - 148/82)  RR: 16 (04-03-25 @ 12:00) (13 - 25)  SpO2: 100% (04-03-25 @ 12:00) (99% - 100%)  Wt(kg): --  CAPILLARY BLOOD GLUCOSE          PHYSICAL EXAM:  General: NAD  Neuro:  Alert & oriented x 3  HEENT: NCAT, EOMI, conjunctiva clear  CV: +S1+S2 regular rate and rhythm  Lung: respirations nonlabored, good inspiratory effort  Abdomen: soft, nondistended, nontender  Extremities: no pedal edema or calf tenderness noted     LABS:                        8.0    6.04  )-----------( 131      ( 03 Apr 2025 10:40 )             23.5     04-03    141  |  111[H]  |  10  ----------------------------<  94  3.4[L]   |  26  |  0.92    Ca    7.9[L]      03 Apr 2025 03:30  Phos  2.5     04-03  Mg     1.9     04-03    TPro  4.7[L]  /  Alb  2.3[L]  /  TBili  1.0  /  DBili  x   /  AST  20  /  ALT  17  /  AlkPhos  47  04-03    PT/INR - ( 01 Apr 2025 20:15 )   PT: 12.9 sec;   INR: 1.11 ratio         PTT - ( 01 Apr 2025 20:15 )  PTT:26.1 sec    I&O:         Impression: 80y Male with PMHx of HTN, HLD, CAD s/p 2 stent placement on ASA/Plavix, recurrent GI bleeds admitted with LGIB.   Underwent colonoscopy with Dr. Small on 4/1 and found to have right-sided diverticular bleed.   CTA negative, per IR no immediate IR intervention    Plan as discussed with Dr. Hitesh Martines:  - No acute surgical intervention  - Monitor H&H, transfuse PRN  - Continue to hold AC if no contraindication  - Will continue to follow   - Continue care per GI and CCU

## 2025-04-03 NOTE — DIETITIAN INITIAL EVALUATION ADULT - OTHER CALCULATIONS
03/30, 67.8 kg-> 04/01, 64.9 kg(drug dose wt.)  04/01, 64.9 kg(daily wt.), wt. loss of 2.9 kg since adm/4 days noted

## 2025-04-03 NOTE — DIETITIAN INITIAL EVALUATION ADULT - PERTINENT LABORATORY DATA
04-03    141  |  111[H]  |  10  ----------------------------<  94  3.4[L]   |  26  |  0.92    Ca    7.9[L]      03 Apr 2025 03:30  Phos  2.5     04-03  Mg     1.9     04-03    TPro  4.7[L]  /  Alb  2.3[L]  /  TBili  1.0  /  DBili  x   /  AST  20  /  ALT  17  /  AlkPhos  47  04-03

## 2025-04-03 NOTE — PROGRESS NOTE ADULT - SUBJECTIVE AND OBJECTIVE BOX
Pt still with slow drop in H/H  Colonoscopy showed R sided diverticular bleed    MEDICATIONS  (STANDING):  atorvastatin 40 milliGRAM(s) Oral at bedtime  chlorhexidine 2% Cloths 1 Application(s) Topical daily  pantoprazole   Suspension 40 milliGRAM(s) Oral daily    MEDICATIONS  (PRN):      Allergies    sulfa drugs (Unknown)    Intolerances        Vital Signs Last 24 Hrs  T(C): 36.4 (03 Apr 2025 07:32), Max: 36.8 (02 Apr 2025 20:00)  T(F): 97.6 (03 Apr 2025 07:32), Max: 98.2 (02 Apr 2025 20:00)  HR: 68 (03 Apr 2025 08:00) (60 - 88)  BP: 130/55 (03 Apr 2025 08:00) (90/58 - 148/82)  BP(mean): 74 (03 Apr 2025 08:00) (61 - 101)  RR: 17 (03 Apr 2025 08:00) (13 - 21)  SpO2: 99% (03 Apr 2025 08:00) (99% - 100%)    Parameters below as of 03 Apr 2025 08:00  Patient On (Oxygen Delivery Method): room air        PHYSICAL EXAM:  General: NAD.  CVS: S1, S2  Chest: air entry bilaterally present  Abd: BS present, soft, non-tender      LABS:                        7.4    6.41  )-----------( 108      ( 03 Apr 2025 03:30 )             21.3     04-03    141  |  111[H]  |  10  ----------------------------<  94  3.4[L]   |  26  |  0.92    Ca    7.9[L]      03 Apr 2025 03:30  Phos  2.5     04-03  Mg     1.9     04-03    TPro  4.7[L]  /  Alb  2.3[L]  /  TBili  1.0  /  DBili  x   /  AST  20  /  ALT  17  /  AlkPhos  47  04-03    PT/INR - ( 01 Apr 2025 20:15 )   PT: 12.9 sec;   INR: 1.11 ratio         PTT - ( 01 Apr 2025 20:15 )  PTT:26.1 sec    continue clear liquids  follow cbc  If bleeding becomes more active would consider IR

## 2025-04-03 NOTE — PROGRESS NOTE ADULT - ASSESSMENT
Pt is an 79 yo M with h/o CAD with 2 stents 6 years ago on Aspirin and Plavix, HLD,  diverticulitis, with h/o recurrent LGIB due to diverticular bleeding, last admitted in our system January 2023 for similar episode of LGIB/ BRBPR on 3/30.  Pt noted to have 6 episodes with CT angio negative for active bleeding. 4/1 s/p colonoscopy: large blood in R colon from suspected diverticula however unable to visualize or treat source.  4/1 s/p CTA abd/pelvis: No acute GI bleed. Diffuse colonic diverticulosis. ICU dx: Acute blood loss anemia 2 to LGIB (diverticular bleed). 4/2 am while standing up pt felt dizzy with a drop in BP    CVS: Mild vasovagal rxn 4/2 am  HEME: DVT prophylaxis with Venodynes/ Most recent Hb 8.0  FEN: Cont clears/ Replace K; pt hypokalemic  GI: F/u as per GI, IR and Surg  Endo: Follow Glu  Social: Pt to remain in the ICU 2 to decrease in Hb

## 2025-04-03 NOTE — PROGRESS NOTE ADULT - SUBJECTIVE AND OBJECTIVE BOX
HPI:  Pt is an 81 yo M with h/o CAD with 2 stents 6 years ago on Aspirin and Plavix, HLD,  diverticulitis, with h/o recurrent LGIB due to diverticular bleeding, last admitted in our system 2023 for similar episode of LGIB/ BRBPR on 3/30.  Pt noted to have 6 episodes with CT angio negative for active bleeding.  s/p colonoscopy: large blood in R colon from suspected diverticula however unable to visualize or treat source.   s/p CTA abd/pelvis: No acute GI bleed. Diffuse colonic diverticulosis. ICU dx: Acute blood loss anemia 2 to LGIB (diverticular bleed). 4/ am while standing up pt felt dizzy with a drop in BP      ## Labs:  CBC:                        8.0    6.04  )-----------( 131      ( 2025 10:40 )             23.5     Chem:  -    141  |  111[H]  |  10  ----------------------------<  94  3.4[L]   |  26  |  0.92    Ca    7.9[L]      2025 03:30  Phos  2.5     04-03  Mg     1.9     04-03    TPro  4.7[L]  /  Alb  2.3[L]  /  TBili  1.0  /  DBili  x   /  AST  20  /  ALT  17  /  AlkPhos  47  04-03    Coags:  PT/INR - ( 2025 20:15 )   PT: 12.9 sec;   INR: 1.11 ratio         PTT - ( 2025 20:15 )  PTT:26.1 sec        ## Imaging:    ## Medications:        atorvastatin 40 milliGRAM(s) Oral at bedtime      pantoprazole   Suspension 40 milliGRAM(s) Oral daily        ## Vitals:  T(C): 36.8 (25 @ 11:31), Max: 36.8 (25 @ 20:00)  HR: 80 (25 @ 17:00) (62 - 87)  BP: 96/82 (25 @ 17:00) (96/82 - 140/64)  BP(mean): 86 (25 @ 17:00) (65 - 89)  RR: 19 (25 @ 17:00) (13 - 25)  SpO2: 100% (25 @ 17:00) (98% - 100%)  Wt(kg): --  Vent:   AB-02 @ 07:  -   @ 07:00  --------------------------------------------------------  IN: 200 mL / OUT: 600 mL / NET: -400 mL     @ 07:  -   @ 17:31  --------------------------------------------------------  IN: 800 mL / OUT: 500 mL / NET: 300 mL          ## P/E:  Gen: lying comfortably in bed in no apparent distress  Lungs: CTA  Heart: RRR  Abd: Soft/+BS/ Non-tender  Ext: No edema  Neuro: Awake, alert    CENTRAL LINE: [ ] YES [ ] NO  LOCATION:   DATE INSERTED:  REMOVE: [ ] YES [ ] NO      MERLY: [ ] YES [ ] NO    DATE INSERTED:  REMOVE:  [ ] YES [ ] NO      A-LINE:  [ ] YES [ ] NO  LOCATION:   DATE INSERTED:  REMOVE:  [ ] YES [ ] NO  EXPLAIN:      CODE STATUS: [x ] full code  [ ] DNR  [ ] DNI  [ ] [facility]  Goals of care discussion: [ ] yes

## 2025-04-03 NOTE — DIETITIAN INITIAL EVALUATION ADULT - ORAL INTAKE PTA/DIET HISTORY
Pt seen sitting out of bed in chair, pt's wife @ bedside.  They both stated that pt had good appetite PTA.  Pt/wife did the shopping/cooking.  Diet PTA: low sodium.  Pt without report of chewing/swallowing difficulty/food allergies/intolerances.

## 2025-04-03 NOTE — DIETITIAN INITIAL EVALUATION ADULT - OTHER INFO
Per chart review, pt c acute blood loss anemia secondary to lower GI bleed, no surgical intervention as per Surgery note.  Pt is receiving Ensure Clear 8 oz 3x/day (720 rene, 24 gm pro) on clear fluid diet as oral supplement.

## 2025-04-03 NOTE — PROGRESS NOTE ADULT - NS ATTEND AMEND GEN_ALL_CORE FT
H/h stable, IR recs reviewed, no bloody BM since yesterday. Continuing to trend labs and for more BMs. Discussed current clinical status and plans w/ pt and pt's family at bedside. Spent more than 50 minutes reviewing this patient's chart and treating the patient. H/h stable, IR recs reviewed, no bloody BM since yesterday. Continuing to trend labs and for more BMs. Pt tolerating diet. Spent more than 50 minutes reviewing this patient's chart and treating the patient.

## 2025-04-04 LAB
ALBUMIN SERPL ELPH-MCNC: 2.3 G/DL — LOW (ref 3.3–5)
ALP SERPL-CCNC: 49 U/L — SIGNIFICANT CHANGE UP (ref 40–120)
ALT FLD-CCNC: 17 U/L — SIGNIFICANT CHANGE UP (ref 12–78)
ANION GAP SERPL CALC-SCNC: 4 MMOL/L — LOW (ref 5–17)
AST SERPL-CCNC: 23 U/L — SIGNIFICANT CHANGE UP (ref 15–37)
BASOPHILS # BLD AUTO: 0.02 K/UL — SIGNIFICANT CHANGE UP (ref 0–0.2)
BASOPHILS NFR BLD AUTO: 0.4 % — SIGNIFICANT CHANGE UP (ref 0–2)
BILIRUB SERPL-MCNC: 0.9 MG/DL — SIGNIFICANT CHANGE UP (ref 0.2–1.2)
BUN SERPL-MCNC: 5 MG/DL — LOW (ref 7–23)
CALCIUM SERPL-MCNC: 7.7 MG/DL — LOW (ref 8.5–10.1)
CHLORIDE SERPL-SCNC: 112 MMOL/L — HIGH (ref 96–108)
CO2 SERPL-SCNC: 28 MMOL/L — SIGNIFICANT CHANGE UP (ref 22–31)
CREAT SERPL-MCNC: 0.99 MG/DL — SIGNIFICANT CHANGE UP (ref 0.5–1.3)
EGFR: 76 ML/MIN/1.73M2 — SIGNIFICANT CHANGE UP
EGFR: 76 ML/MIN/1.73M2 — SIGNIFICANT CHANGE UP
EOSINOPHIL # BLD AUTO: 0.62 K/UL — HIGH (ref 0–0.5)
EOSINOPHIL NFR BLD AUTO: 12.1 % — HIGH (ref 0–6)
GLUCOSE SERPL-MCNC: 96 MG/DL — SIGNIFICANT CHANGE UP (ref 70–99)
HCT VFR BLD CALC: 24.6 % — LOW (ref 39–50)
HGB BLD-MCNC: 8.4 G/DL — LOW (ref 13–17)
IMM GRANULOCYTES NFR BLD AUTO: 0.6 % — SIGNIFICANT CHANGE UP (ref 0–0.9)
LYMPHOCYTES # BLD AUTO: 1.12 K/UL — SIGNIFICANT CHANGE UP (ref 1–3.3)
LYMPHOCYTES # BLD AUTO: 21.9 % — SIGNIFICANT CHANGE UP (ref 13–44)
MAGNESIUM SERPL-MCNC: 1.8 MG/DL — SIGNIFICANT CHANGE UP (ref 1.6–2.6)
MCHC RBC-ENTMCNC: 29.7 PG — SIGNIFICANT CHANGE UP (ref 27–34)
MCHC RBC-ENTMCNC: 34.1 G/DL — SIGNIFICANT CHANGE UP (ref 32–36)
MCV RBC AUTO: 86.9 FL — SIGNIFICANT CHANGE UP (ref 80–100)
MONOCYTES # BLD AUTO: 0.56 K/UL — SIGNIFICANT CHANGE UP (ref 0–0.9)
MONOCYTES NFR BLD AUTO: 11 % — SIGNIFICANT CHANGE UP (ref 2–14)
NEUTROPHILS # BLD AUTO: 2.76 K/UL — SIGNIFICANT CHANGE UP (ref 1.8–7.4)
NEUTROPHILS NFR BLD AUTO: 54 % — SIGNIFICANT CHANGE UP (ref 43–77)
NRBC BLD AUTO-RTO: 0 /100 WBCS — SIGNIFICANT CHANGE UP (ref 0–0)
PHOSPHATE SERPL-MCNC: 3.1 MG/DL — SIGNIFICANT CHANGE UP (ref 2.5–4.5)
PLATELET # BLD AUTO: 120 K/UL — LOW (ref 150–400)
POTASSIUM SERPL-MCNC: 3.7 MMOL/L — SIGNIFICANT CHANGE UP (ref 3.5–5.3)
POTASSIUM SERPL-SCNC: 3.7 MMOL/L — SIGNIFICANT CHANGE UP (ref 3.5–5.3)
PROT SERPL-MCNC: 4.7 GM/DL — LOW (ref 6–8.3)
RBC # BLD: 2.83 M/UL — LOW (ref 4.2–5.8)
RBC # FLD: 14 % — SIGNIFICANT CHANGE UP (ref 10.3–14.5)
SODIUM SERPL-SCNC: 144 MMOL/L — SIGNIFICANT CHANGE UP (ref 135–145)
WBC # BLD: 5.11 K/UL — SIGNIFICANT CHANGE UP (ref 3.8–10.5)
WBC # FLD AUTO: 5.11 K/UL — SIGNIFICANT CHANGE UP (ref 3.8–10.5)

## 2025-04-04 PROCEDURE — 99233 SBSQ HOSP IP/OBS HIGH 50: CPT

## 2025-04-04 RX ORDER — MAGNESIUM SULFATE 500 MG/ML
2 SYRINGE (ML) INJECTION ONCE
Refills: 0 | Status: COMPLETED | OUTPATIENT
Start: 2025-04-04 | End: 2025-04-04

## 2025-04-04 RX ADMIN — ATORVASTATIN CALCIUM 40 MILLIGRAM(S): 80 TABLET, FILM COATED ORAL at 22:32

## 2025-04-04 RX ADMIN — Medication 100 MILLIEQUIVALENT(S): at 07:15

## 2025-04-04 RX ADMIN — Medication 100 MILLIEQUIVALENT(S): at 05:35

## 2025-04-04 RX ADMIN — Medication 25 GRAM(S): at 05:16

## 2025-04-04 RX ADMIN — Medication 1 APPLICATION(S): at 05:35

## 2025-04-04 RX ADMIN — Medication 40 MILLIGRAM(S): at 11:38

## 2025-04-04 RX ADMIN — Medication 100 MILLIEQUIVALENT(S): at 05:13

## 2025-04-04 NOTE — PROGRESS NOTE ADULT - NS ATTEST RISK PROBLEM GEN_ALL_CORE FT
Acute blood loss anemia 2 to LGIB
Symptomatic LGIB
Acute blood loss anemia 2 to LGIB (diverticular bleed).

## 2025-04-04 NOTE — PROGRESS NOTE ADULT - NS ATTEND AMEND GEN_ALL_CORE FT
No BM past 48h, got 1u yesterday w/ improved H/h this AM.  Continue ICU care, trend H/h and transfuse as needed.

## 2025-04-04 NOTE — PROGRESS NOTE ADULT - SUBJECTIVE AND OBJECTIVE BOX
Pt stable  transfused 1u prbc's yesterday  Pt likely with R sided diverticular bleed  started on regular diet      MEDICATIONS  (STANDING):  atorvastatin 40 milliGRAM(s) Oral at bedtime  chlorhexidine 2% Cloths 1 Application(s) Topical daily  pantoprazole   Suspension 40 milliGRAM(s) Oral daily    MEDICATIONS  (PRN):      Allergies    sulfa drugs (Unknown)    Intolerances        Vital Signs Last 24 Hrs  T(C): 37 (04 Apr 2025 07:21), Max: 37 (04 Apr 2025 07:21)  T(F): 98.6 (04 Apr 2025 07:21), Max: 98.6 (04 Apr 2025 07:21)  HR: 84 (04 Apr 2025 10:00) (67 - 99)  BP: 132/77 (04 Apr 2025 10:00) (96/82 - 157/68)  BP(mean): 91 (04 Apr 2025 10:00) (66 - 95)  RR: 15 (04 Apr 2025 10:00) (15 - 26)  SpO2: 100% (04 Apr 2025 10:00) (98% - 100%)    Parameters below as of 04 Apr 2025 07:00  Patient On (Oxygen Delivery Method): room air        PHYSICAL EXAM:  General: NAD.  CVS: S1, S2  Chest: air entry bilaterally present  Abd: BS present, soft, non-tender      LABS:                        8.4    5.11  )-----------( 120      ( 04 Apr 2025 04:10 )             24.6     04-04    144  |  112[H]  |  5[L]  ----------------------------<  96  3.7   |  28  |  0.99    Ca    7.7[L]      04 Apr 2025 04:10  Phos  3.1     04-04  Mg     1.8     04-04    TPro  4.7[L]  /  Alb  2.3[L]  /  TBili  0.9  /  DBili  x   /  AST  23  /  ALT  17  /  AlkPhos  49  04-04        follow CBC  might need IR procedure if bleeding continues

## 2025-04-04 NOTE — PROGRESS NOTE ADULT - ASSESSMENT
Pt is an 81 yo M with h/o CAD with 2 stents 6 years ago on Aspirin and Plavix, HLD,  diverticulitis, with h/o recurrent LGIB due to diverticular bleeding, last admitted in our system January 2023 for similar episode of LGIB/ BRBPR on 3/30.  Pt noted to have 6 episodes with CT angio negative for active bleeding. 4/1 s/p colonoscopy: large blood in R colon from suspected diverticula however unable to visualize or treat source.  4/1 s/p CTA abd/pelvis: No acute GI bleed. Diffuse colonic diverticulosis. ICU dx: Acute blood loss anemia 2 to LGIB (diverticular bleed). 4/2 am while standing up pt felt dizzy with a drop in BP. Last pm Hb 7.2; transfused PRBC. Hb today 8.4    CVS: Mild vasovagal rxn 4/2 am  HEME: DVT prophylaxis with Venodynes/ Hb 8.4  FEN: Regular po diet  GI: F/u as per GI, IR and Surg  Endo: Follow Glu  Social: May transfer to [facility]

## 2025-04-04 NOTE — PROGRESS NOTE ADULT - SUBJECTIVE AND OBJECTIVE BOX
Patient seen and examined bedside with Dr. Hitesh Martines resting comfortably.  No complaints offered. Denies BM since yesterday.    Denies abdominal pain.   Denies nausea and vomiting. Tolerating diet.  Denies chest pain, dyspnea, cough.    T(F): 98.6 (04-04-25 @ 07:21), Max: 98.6 (04-04-25 @ 07:21)  HR: 84 (04-04-25 @ 10:00) (67 - 99)  BP: 132/77 (04-04-25 @ 10:00) (96/82 - 157/68)  RR: 15 (04-04-25 @ 10:00) (15 - 26)  SpO2: 100% (04-04-25 @ 10:00) (98% - 100%)  Wt(kg): --  CAPILLARY BLOOD GLUCOSE          PHYSICAL EXAM:  General: NAD  Neuro:  Alert & oriented x 3  HEENT: NCAT, EOMI, conjunctiva clear  CV: +S1+S2 regular rate and rhythm  Lung: respirations nonlabored, good inspiratory effort  Abdomen: soft, NTND.   Extremities: no pedal edema or calf tenderness noted     LABS:                        8.4    5.11  )-----------( 120      ( 04 Apr 2025 04:10 )             24.6     04-04    144  |  112[H]  |  5[L]  ----------------------------<  96  3.7   |  28  |  0.99    Ca    7.7[L]      04 Apr 2025 04:10  Phos  3.1     04-04  Mg     1.8     04-04    TPro  4.7[L]  /  Alb  2.3[L]  /  TBili  0.9  /  DBili  x   /  AST  23  /  ALT  17  /  AlkPhos  49  04-04        I&O:         Impression: 80y Male 80y Male with PMHx of HTN, HLD, CAD s/p 2 stent placement on ASA/Plavix, recurrent GI bleeds admitted with LGIB.   Underwent colonoscopy with Dr. Small on 4/1 and found to have right-sided diverticular bleed.   CTA negative, per IR no immediate IR intervention  Received 1uPRBC yesterday with appropriate H&H response   Vitals stable    Plan as discussed with Dr. Hitesh Martines:  - No acute surgical intervention  - Monitor H&H, transfuse PRN  - Continue to hold AC if no contraindication  - Will continue to follow   - Continue care per GI and CCU

## 2025-04-04 NOTE — PROGRESS NOTE ADULT - SUBJECTIVE AND OBJECTIVE BOX
HPI:  Pt is an 79 yo M with h/o CAD with 2 stents 6 years ago on Aspirin and Plavix, HLD,  diverticulitis, with h/o recurrent LGIB due to diverticular bleeding, last admitted in our system 2023 for similar episode of LGIB/ BRBPR on 3/30.  Pt noted to have 6 episodes with CT angio negative for active bleeding.  s/p colonoscopy: large blood in R colon from suspected diverticula however unable to visualize or treat source.   s/p CTA abd/pelvis: No acute GI bleed. Diffuse colonic diverticulosis. ICU dx: Acute blood loss anemia 2 to LGIB (diverticular bleed). 4 am while standing up pt felt dizzy with a drop in BP. Last pm Hb 7.2; transfused PRBC. Hb today 8.4      ## Labs:  CBC:                        8.4    5.11  )-----------( 120      ( 2025 04:10 )             24.6     Chem:      144  |  112[H]  |  5[L]  ----------------------------<  96  3.7   |  28  |  0.99    Ca    7.7[L]      2025 04:10  Phos  3.1       Mg     1.8         TPro  4.7[L]  /  Alb  2.3[L]  /  TBili  0.9  /  DBili  x   /  AST  23  /  ALT  17  /  AlkPhos  49  -    Coags:          ## Imaging:    ## Medications:        atorvastatin 40 milliGRAM(s) Oral at bedtime      pantoprazole    Tablet 40 milliGRAM(s) Oral before breakfast        ## Vitals:  T(C): 36.3 (25 @ 11:25), Max: 37 (25 @ 07:21)  HR: 84 (25 @ 10:00) (67 - 99)  BP: 132/77 (25 @ 10:00) (96/82 - 157/68)  BP(mean): 91 (25 @ 10:00) (66 - 95)  RR: 15 (25 @ 10:00) (15 - 26)  SpO2: 100% (25 @ 10:00) (98% - 100%)  Wt(kg): --  Vent:   AB-03 @ 07:01  -   @ 07:00  --------------------------------------------------------  IN: 2060 mL / OUT: 1950 mL / NET: 110 mL     @ 07:  -   @ 12:46  --------------------------------------------------------  IN: 250 mL / OUT: 150 mL / NET: 100 mL          ## P/E:  Gen: lying comfortably in bed in no apparent distress  Lungs: CTA  Heart: RRR  Abd: Soft/+BS/ Non-tender  Ext: No edema  Neuro: AAo x3    CENTRAL LINE: [ ] YES [ ] NO  LOCATION:   DATE INSERTED:  REMOVE: [ ] YES [ ] NO      [place]: [ ] YES [ ] NO    DATE INSERTED:  REMOVE:  [ ] YES [ ] NO      A-LINE:  [ ] YES [ ] NO  LOCATION:   DATE INSERTED:  REMOVE:  [ ] YES [ ] NO  EXPLAIN:      CODE STATUS: [x ] full code  [ ] DNR  [ ] DNI  [ ] [facility]  Goals of care discussion: [ ] yes

## 2025-04-04 NOTE — CHART NOTE - NSCHARTNOTEFT_GEN_A_CORE
Interventional Radiology Follow Up Note    80y Male with PMH of CAD with 2 stents approximately 6 years ago, on Aspirin and Plavix intermittently, hyperlipidemia, diverticulitis, with history of recurrent GI bleeds due to diverticular bleeding, last admitted in our system January 2023 for similar episode of GI bleeding. Patient comes in 3/30 for concern of more than 6 episodes of bright red blood per rectum. CTA negative for active bleeding. Pt was admitted to medicine with plan for colonoscopy with GI. Initial Hb 11 but drifted to ~9. s/p colonoscopy yesterday with Dr Small who reported large amount of bright red blood in the colon which he surmises was coming from some bleeding diverticula in the right colon but was unable to visualize source nor treat. Hb drifted to 8.9. 1u PRBC given and pt upgraded to ICU.     Case reviewed with covering attending Dr. Ayala.  CTA negative x2. 1u PRBC given yesterday with appropriate response. Hgb this AM 8.4. Patient without any episodes of active bleeding since 4/2.   Continue to monitor H/H and transfuse as needed.   No IR intervention planned at this time. IR will sign off for now, may reconsult as needed.     Kennedi Harrington PA-C   Interventional Radiology   Available on TEAMs

## 2025-04-04 NOTE — CHART NOTE - NSCHARTNOTEFT_GEN_A_CORE
79 yo M with h/o CAD with 2 stents 6 years ago on Aspirin and Plavix, HLD,  diverticulitis, with h/o recurrent LGIB due to diverticular bleeding, last admitted in our system January 2023 for similar episode of LGIB/ BRBPR on 3/30.  Pt noted to have 6 episodes with CT angio negative for active bleeding. 4/1 s/p colonoscopy: large blood in R colon from suspected diverticula however unable to visualize or treat source.  4/1 s/p CTA abd/pelvis: No acute GI bleed. Diffuse colonic diverticulosis. ICU dx: Acute blood loss anemia 2 to LGIB (diverticular bleed).   Patient HD stable, no signs of visible GI bleeding, awake and alert, out of bed to chair.     Plan:   - Hg drop this AM got 1u PRBC now stable 8.4   - continue to trend H/H   - appreciate surgical & GI recommendations   - PPI QD   - advanced diet today from clears to regular.   - OOB   - PT   - SCDs         Discussed with ICU attending Dr. Suh. Discussed with hospitalist. 81 yo M with h/o CAD with 2 stents 6 years ago on Aspirin and Plavix, HLD,  diverticulitis, with h/o recurrent LGIB due to diverticular bleeding, last admitted in our system January 2023 for similar episode of LGIB/ BRBPR on 3/30.  Pt noted to have 6 episodes with CT angio negative for active bleeding. 4/1 s/p colonoscopy: large blood in R colon from suspected diverticula however unable to visualize or treat source.  4/1 s/p CTA abd/pelvis: No acute GI bleed. Diffuse colonic diverticulosis. ICU dx: Acute blood loss anemia 2 to LGIB (diverticular bleed).   Patient HD stable, no signs of visible GI bleeding, awake and alert, out of bed to chair.     Plan:   - Hg drop this AM got 1u PRBC now stable 8.4   - continue to trend H/H   - appreciate surgical & GI recommendations   - No IR intervention at this time   - PPI QD   - advanced diet today from clears to regular.   - OOB   - PT   - SCDs         Discussed with ICU attending Dr. Suh. Discussed with hospitalist.

## 2025-04-05 LAB
ALBUMIN SERPL ELPH-MCNC: 2.3 G/DL — LOW (ref 3.3–5)
ALP SERPL-CCNC: 54 U/L — SIGNIFICANT CHANGE UP (ref 40–120)
ALT FLD-CCNC: 20 U/L — SIGNIFICANT CHANGE UP (ref 12–78)
ANION GAP SERPL CALC-SCNC: 5 MMOL/L — SIGNIFICANT CHANGE UP (ref 5–17)
AST SERPL-CCNC: 26 U/L — SIGNIFICANT CHANGE UP (ref 15–37)
BILIRUB SERPL-MCNC: 0.6 MG/DL — SIGNIFICANT CHANGE UP (ref 0.2–1.2)
BUN SERPL-MCNC: 9 MG/DL — SIGNIFICANT CHANGE UP (ref 7–23)
CALCIUM SERPL-MCNC: 7.9 MG/DL — LOW (ref 8.5–10.1)
CHLORIDE SERPL-SCNC: 109 MMOL/L — HIGH (ref 96–108)
CO2 SERPL-SCNC: 26 MMOL/L — SIGNIFICANT CHANGE UP (ref 22–31)
CREAT SERPL-MCNC: 0.97 MG/DL — SIGNIFICANT CHANGE UP (ref 0.5–1.3)
EGFR: 78 ML/MIN/1.73M2 — SIGNIFICANT CHANGE UP
EGFR: 78 ML/MIN/1.73M2 — SIGNIFICANT CHANGE UP
GLUCOSE SERPL-MCNC: 92 MG/DL — SIGNIFICANT CHANGE UP (ref 70–99)
HCT VFR BLD CALC: 25 % — LOW (ref 39–50)
HGB BLD-MCNC: 8.5 G/DL — LOW (ref 13–17)
MAGNESIUM SERPL-MCNC: 2 MG/DL — SIGNIFICANT CHANGE UP (ref 1.6–2.6)
MCHC RBC-ENTMCNC: 29.6 PG — SIGNIFICANT CHANGE UP (ref 27–34)
MCHC RBC-ENTMCNC: 34 G/DL — SIGNIFICANT CHANGE UP (ref 32–36)
MCV RBC AUTO: 87.1 FL — SIGNIFICANT CHANGE UP (ref 80–100)
NRBC BLD AUTO-RTO: 0 /100 WBCS — SIGNIFICANT CHANGE UP (ref 0–0)
PHOSPHATE SERPL-MCNC: 3.3 MG/DL — SIGNIFICANT CHANGE UP (ref 2.5–4.5)
PLATELET # BLD AUTO: 127 K/UL — LOW (ref 150–400)
POTASSIUM SERPL-MCNC: 3.7 MMOL/L — SIGNIFICANT CHANGE UP (ref 3.5–5.3)
POTASSIUM SERPL-SCNC: 3.7 MMOL/L — SIGNIFICANT CHANGE UP (ref 3.5–5.3)
PROT SERPL-MCNC: 5 GM/DL — LOW (ref 6–8.3)
RBC # BLD: 2.87 M/UL — LOW (ref 4.2–5.8)
RBC # FLD: 14 % — SIGNIFICANT CHANGE UP (ref 10.3–14.5)
SODIUM SERPL-SCNC: 140 MMOL/L — SIGNIFICANT CHANGE UP (ref 135–145)
WBC # BLD: 5.04 K/UL — SIGNIFICANT CHANGE UP (ref 3.8–10.5)
WBC # FLD AUTO: 5.04 K/UL — SIGNIFICANT CHANGE UP (ref 3.8–10.5)

## 2025-04-05 PROCEDURE — 99232 SBSQ HOSP IP/OBS MODERATE 35: CPT

## 2025-04-05 RX ORDER — POLYETHYLENE GLYCOL 3350 17 G/17G
17 POWDER, FOR SOLUTION ORAL ONCE
Refills: 0 | Status: COMPLETED | OUTPATIENT
Start: 2025-04-05 | End: 2025-04-05

## 2025-04-05 RX ORDER — POLYETHYLENE GLYCOL 3350 17 G/17G
17 POWDER, FOR SOLUTION ORAL DAILY
Refills: 0 | Status: DISCONTINUED | OUTPATIENT
Start: 2025-04-05 | End: 2025-04-06

## 2025-04-05 RX ADMIN — POLYETHYLENE GLYCOL 3350 17 GRAM(S): 17 POWDER, FOR SOLUTION ORAL at 10:46

## 2025-04-05 RX ADMIN — ATORVASTATIN CALCIUM 40 MILLIGRAM(S): 80 TABLET, FILM COATED ORAL at 21:54

## 2025-04-05 RX ADMIN — Medication 40 MILLIGRAM(S): at 05:36

## 2025-04-05 RX ADMIN — Medication 1 APPLICATION(S): at 11:06

## 2025-04-05 NOTE — PROGRESS NOTE ADULT - ASSESSMENT
80-year-old male with a history of coronary artery disease (CAD) with two stents placed six years prior (currently on aspirin and Plavix), hyperlipidemia (HLD), and diverticulitis with recurrent lower gastrointestinal bleeding (LGIB) due to diverticular bleeding, presented on 3/30 with another episode of LGIB/bright red blood per rectum (BRBPR). This is similar to his last admission in January 2023. He reported six episodes of bleeding. CT angiography was negative for active bleeding. Colonoscopy on 4/1 revealed a large amount of blood in the right colon, suspected to be from diverticula, but the source could not be visualized or treated. CTA abdomen/pelvis on 4/1 showed no acute GI bleed and diffuse colonic diverticulosis. On 4/2, the patient experienced dizziness and a drop in blood pressure upon standing. His hemoglobin (Hb) on the evening of 4/1 was 7.2 g/dL, for which he received a packed red blood cell (PRBC) transfusion. Current Hb is 8.4 g/dL.    Plan:    GI: Continue to monitor for further bleeding episodes. Follow up with Gastroenterology (GI), Interventional Radiology (IR), and Surgery as recommended. Discuss options for long-term management of diverticular bleeding, considering the recurrent nature and difficulty in localizing the source. Appreciate Surgery recs today   Constipation- bowel regiment     Cardiac: Continue current aspirin and Plavix regimen. Monitor for orthostatic hypotension and assess for any potential cardiac contribution to his dizziness episode.    Hematology: Continue DVT prophylaxis with Venodynes. Monitor hemoglobin and hematocrit levels. Consider further transfusion if necessary.    Endocrine: Monitor blood glucose levels.    [id number]-compression devices

## 2025-04-05 NOTE — PROGRESS NOTE ADULT - NS ATTEND AMEND GEN_ALL_CORE FT
Patient seen and examined with PA  Doing well; no nausea, vomiting, fever or chills  No bloody BM    On exam: awake, alert and oriented  Breathing comfortably on room air; no cough  Abd is soft, not distended, and not tender  No rebound, no guarding    H/H stable    Diverticular bleeding, resolved  - no emergent surgical intervention required at this time  - can follow up as outpatient after discharge  - will sign off, please reconsult as needed

## 2025-04-05 NOTE — PROGRESS NOTE ADULT - ASSESSMENT
80M admitted with LGIB.   Underwent colonoscopy with Dr. Small on 4/1 and found to have right-sided diverticular bleed.   CTA negative, per IR no immediate IR intervention  No BM last 48h    No acute surgical intervention  Monitor H&H, transfuse PRN

## 2025-04-05 NOTE — PROGRESS NOTE ADULT - SUBJECTIVE AND OBJECTIVE BOX
Patient is a 80y old  Male who presents with a chief complaint of GI bleed, BRBPR. (05 Apr 2025 07:31)      INTERVAL HPI/OVERNIGHT EVENTS: pt doing well, denies abdominal pain but states that feels that needs to have a BM and can't. Otherwise denies bleeding per rectum or any other associated complaints.     MEDICATIONS  (STANDING):  atorvastatin 40 milliGRAM(s) Oral at bedtime  chlorhexidine 2% Cloths 1 Application(s) Topical daily  pantoprazole    Tablet 40 milliGRAM(s) Oral before breakfast    MEDICATIONS  (PRN):      Allergies    sulfa drugs (Unknown)    Intolerances        REVIEW OF SYSTEMS:  negative except as above    Vital Signs Last 24 Hrs  T(C): 36.7 (05 Apr 2025 16:43), Max: 36.8 (04 Apr 2025 18:01)  T(F): 98 (05 Apr 2025 16:43), Max: 98.2 (04 Apr 2025 18:01)  HR: 72 (05 Apr 2025 16:43) (71 - 94)  BP: 161/74 (05 Apr 2025 16:43) (132/65 - 161/74)  BP(mean): 94 (05 Apr 2025 11:27) (94 - 94)  RR: 18 (05 Apr 2025 16:43) (18 - 18)  SpO2: 99% (05 Apr 2025 16:43) (97% - 99%)    Parameters below as of 05 Apr 2025 11:27  Patient On (Oxygen Delivery Method): room air        PHYSICAL EXAM:  GENERAL: NAD, well-groomed, well-developed  HEAD:  Atraumatic, Normocephalic  EYES: EOMI, PERRLA, conjunctiva and sclera clear  ENMT: No tonsillar erythema, exudates, or enlargement; Moist mucous membranes, Good dentition, No lesions  NECK: Supple, No JVD, Normal thyroid  NERVOUS SYSTEM:  Alert & Oriented X3, Good concentration; Motor Strength 5/5 B/L upper and lower extremities; DTRs 2+ intact and symmetric  CHEST/LUNG: Clear to percussion bilaterally; No rales, rhonchi, wheezing, or rubs  HEART: Regular rate and rhythm; No murmurs, rubs, or gallops  ABDOMEN: Soft, Nontender, Nondistended; Bowel sounds present  EXTREMITIES:  2+ Peripheral Pulses, No clubbing, cyanosis, or edema  LYMPH: No lymphadenopathy noted  SKIN: No rashes or lesions    LABS:                        8.5    5.04  )-----------( 127      ( 05 Apr 2025 06:52 )             25.0     04-05    140  |  109[H]  |  9   ----------------------------<  92  3.7   |  26  |  0.97    Ca    7.9[L]      05 Apr 2025 06:52  Phos  3.3     04-05  Mg     2.0     04-05    TPro  5.0[L]  /  Alb  2.3[L]  /  TBili  0.6  /  DBili  x   /  AST  26  /  ALT  20  /  AlkPhos  54  04-05      Urinalysis Basic - ( 05 Apr 2025 06:52 )    Color: x / Appearance: x / SG: x / pH: x  Gluc: 92 mg/dL / Ketone: x  / Bili: x / Urobili: x   Blood: x / Protein: x / Nitrite: x   Leuk Esterase: x / RBC: x / WBC x   Sq Epi: x / Non Sq Epi: x / Bacteria: x      CAPILLARY BLOOD GLUCOSE          RADIOLOGY & ADDITIONAL TESTS:    Imaging Personally Reviewed:  [ ] YES  [ ] NO    Consultant(s) Notes Reviewed:  [ ] YES  [ ] NO    Care Discussed with Consultants/Other Providers [ ] YES  [ ] NO

## 2025-04-05 NOTE — PROGRESS NOTE ADULT - SUBJECTIVE AND OBJECTIVE BOX
SURGERY PROGRESS HPI:  Pt seen and examined at bedside. Pt denies pain.  No GI function overnight      Vital Signs Last 24 Hrs  T(C): 36.6 (05 Apr 2025 05:04), Max: 36.8 (04 Apr 2025 18:01)  T(F): 97.9 (05 Apr 2025 05:04), Max: 98.2 (04 Apr 2025 18:01)  HR: 84 (05 Apr 2025 05:04) (70 - 90)  BP: 132/65 (05 Apr 2025 05:04) (115/66 - 159/73)  BP(mean): 90 (04 Apr 2025 16:16) (76 - 95)  RR: 18 (05 Apr 2025 05:04) (15 - 26)  SpO2: 97% (05 Apr 2025 05:04) (97% - 100%)    Parameters below as of 05 Apr 2025 05:04  Patient On (Oxygen Delivery Method): room air          PHYSICAL EXAM:    GENERAL: NAD  CHEST/LUNG: breathing normally  ABDOMEN: non distended, soft, non tender, no guarding

## 2025-04-06 VITALS
HEART RATE: 75 BPM | SYSTOLIC BLOOD PRESSURE: 156 MMHG | DIASTOLIC BLOOD PRESSURE: 74 MMHG | OXYGEN SATURATION: 95 % | RESPIRATION RATE: 17 BRPM | TEMPERATURE: 98 F

## 2025-04-06 LAB
ANION GAP SERPL CALC-SCNC: 3 MMOL/L — LOW (ref 5–17)
BUN SERPL-MCNC: 12 MG/DL — SIGNIFICANT CHANGE UP (ref 7–23)
CALCIUM SERPL-MCNC: 8.3 MG/DL — LOW (ref 8.5–10.1)
CHLORIDE SERPL-SCNC: 108 MMOL/L — SIGNIFICANT CHANGE UP (ref 96–108)
CO2 SERPL-SCNC: 30 MMOL/L — SIGNIFICANT CHANGE UP (ref 22–31)
CREAT SERPL-MCNC: 1.04 MG/DL — SIGNIFICANT CHANGE UP (ref 0.5–1.3)
EGFR: 72 ML/MIN/1.73M2 — SIGNIFICANT CHANGE UP
EGFR: 72 ML/MIN/1.73M2 — SIGNIFICANT CHANGE UP
GLUCOSE SERPL-MCNC: 103 MG/DL — HIGH (ref 70–99)
HCT VFR BLD CALC: 25.7 % — LOW (ref 39–50)
HGB BLD-MCNC: 8.8 G/DL — LOW (ref 13–17)
MCHC RBC-ENTMCNC: 29.3 PG — SIGNIFICANT CHANGE UP (ref 27–34)
MCHC RBC-ENTMCNC: 34.2 G/DL — SIGNIFICANT CHANGE UP (ref 32–36)
MCV RBC AUTO: 85.7 FL — SIGNIFICANT CHANGE UP (ref 80–100)
NRBC BLD AUTO-RTO: 0 /100 WBCS — SIGNIFICANT CHANGE UP (ref 0–0)
PLATELET # BLD AUTO: 143 K/UL — LOW (ref 150–400)
POTASSIUM SERPL-MCNC: 3.8 MMOL/L — SIGNIFICANT CHANGE UP (ref 3.5–5.3)
POTASSIUM SERPL-SCNC: 3.8 MMOL/L — SIGNIFICANT CHANGE UP (ref 3.5–5.3)
RBC # BLD: 3 M/UL — LOW (ref 4.2–5.8)
RBC # FLD: 14 % — SIGNIFICANT CHANGE UP (ref 10.3–14.5)
SODIUM SERPL-SCNC: 141 MMOL/L — SIGNIFICANT CHANGE UP (ref 135–145)
WBC # BLD: 5.56 K/UL — SIGNIFICANT CHANGE UP (ref 3.8–10.5)
WBC # FLD AUTO: 5.56 K/UL — SIGNIFICANT CHANGE UP (ref 3.8–10.5)

## 2025-04-06 PROCEDURE — 99239 HOSP IP/OBS DSCHRG MGMT >30: CPT

## 2025-04-06 RX ORDER — LISINOPRIL AND HYDROCHLOROTHIAZIDE 12.5; 2 MG/1; MG/1
1 TABLET ORAL
Qty: 30 | Refills: 0
Start: 2025-04-06 | End: 2025-05-05

## 2025-04-06 RX ORDER — SIMETHICONE 80 MG
80 TABLET,CHEWABLE ORAL ONCE
Refills: 0 | Status: COMPLETED | OUTPATIENT
Start: 2025-04-06 | End: 2025-04-06

## 2025-04-06 RX ORDER — ASPIRIN 325 MG
81 TABLET ORAL DAILY
Refills: 0 | Status: DISCONTINUED | OUTPATIENT
Start: 2025-04-06 | End: 2025-04-06

## 2025-04-06 RX ADMIN — POLYETHYLENE GLYCOL 3350 17 GRAM(S): 17 POWDER, FOR SOLUTION ORAL at 11:12

## 2025-04-06 RX ADMIN — Medication 80 MILLIGRAM(S): at 11:13

## 2025-04-06 RX ADMIN — Medication 40 MILLIGRAM(S): at 06:32

## 2025-04-06 RX ADMIN — Medication 81 MILLIGRAM(S): at 11:12

## 2025-04-06 NOTE — DISCHARGE NOTE PROVIDER - NSDCFUADDAPPT_GEN_ALL_CORE_FT
Please monitor your bleeding---if moderate/severe bleeding come back to Emergency room otherwise please follow with your Primary doctor and Gastroenterologist.

## 2025-04-06 NOTE — DISCHARGE NOTE NURSING/CASE MANAGEMENT/SOCIAL WORK - NSDCVIVACCINE_GEN_ALL_CORE_FT
influenza, injectable, quadrivalent, preservative free; 10-Nov-2014 16:49; Rio Moore ([place]); [employer]; [id number]; IntraMuscular; Deltoid Right.; 0.5 milliLiter(s);   pneumococcal polysaccharide PPV23; 10-Nov-2014 16:54; Rio Moore (RN); [employer] [id number]; IntraMuscular; Deltoid Left.; 0.5 milliLiter(s);

## 2025-04-06 NOTE — DISCHARGE NOTE NURSING/CASE MANAGEMENT/SOCIAL WORK - PATIENT PORTAL LINK FT
You can access the FollowMyHealth Patient Portal offered by [facility] by registering at the following website: [email or web address]. By joining [facility]’s FollowMyHealth portal, you will also be able to view your health information using other applications (apps) compatible with our system.

## 2025-04-06 NOTE — DISCHARGE NOTE PROVIDER - CARE PROVIDER_API CALL
Micah Small  Gastroenterology  [address]  [place] [address]  Phone: [phone]  Fax: [phone]  Follow Up Time: 1 week    primary doctor,   Phone: (   )    -  Fax: (   )    -  Follow Up Time:

## 2025-04-06 NOTE — PROGRESS NOTE ADULT - SUBJECTIVE AND OBJECTIVE BOX
Pt seen earlier today  No further bleeding  re-started on ASA    MEDICATIONS  (STANDING):  aspirin  chewable 81 milliGRAM(s) Oral daily  atorvastatin 40 milliGRAM(s) Oral at bedtime  chlorhexidine 2% Cloths 1 Application(s) Topical daily  pantoprazole    Tablet 40 milliGRAM(s) Oral before breakfast  polyethylene glycol 3350 17 Gram(s) Oral daily    MEDICATIONS  (PRN):      Allergies    sulfa drugs (Unknown)    Intolerances        Vital Signs Last 24 Hrs  T(C): 36.7 (06 Apr 2025 11:55), Max: 36.7 (06 Apr 2025 00:06)  T(F): 98 (06 Apr 2025 11:55), Max: 98.1 (06 Apr 2025 00:06)  HR: 75 (06 Apr 2025 11:55) (75 - 82)  BP: 156/74 (06 Apr 2025 11:55) (113/62 - 156/74)  BP(mean): --  RR: 17 (06 Apr 2025 11:55) (17 - 18)  SpO2: 95% (06 Apr 2025 11:55) (95% - 98%)    Parameters below as of 06 Apr 2025 04:56  Patient On (Oxygen Delivery Method): room air        PHYSICAL EXAM:  General: NAD.  CVS: S1, S2  Chest: air entry bilaterally present  Abd: BS present, soft, non-tender      LABS:                        8.8    5.56  )-----------( 143      ( 06 Apr 2025 05:43 )             25.7     04-06    141  |  108  |  12  ----------------------------<  103[H]  3.8   |  30  |  1.04    Ca    8.3[L]      06 Apr 2025 05:43  Phos  3.3     04-05  Mg     2.0     04-05    TPro  5.0[L]  /  Alb  2.3[L]  /  TBili  0.6  /  DBili  x   /  AST  26  /  ALT  20  /  AlkPhos  54  04-05        Hgb 8.8   stable from GI standpoint  continue protonix  f/u as outpatient in 2 weeks

## 2025-04-06 NOTE — DISCHARGE NOTE NURSING/CASE MANAGEMENT/SOCIAL WORK - FINANCIAL ASSISTANCE
[facility] provides services at a reduced cost to those who are determined to be eligible through [facility]’s financial assistance program. Information regarding [facility]’s financial assistance program can be found by going to [email or web address] or by calling [phone].

## 2025-04-06 NOTE — DISCHARGE NOTE PROVIDER - ATTENDING DISCHARGE PHYSICAL EXAMINATION:
PHYSICAL EXAM:  GENERAL: NAD, well-groomed, well-developed  HEAD:  Atraumatic, Normocephalic  EYES: EOMI, PERRLA, conjunctiva and sclera clear  ENMT: No tonsillar erythema, exudates, or enlargement; Moist mucous membranes, Good dentition, No lesions  NECK: Supple, No JVD,   NERVOUS SYSTEM:  Alert & Oriented X3, Good concentration; Motor Strength 5/5 B/L upper and lower extremities; DTRs 2+ intact and symmetric  CHEST/LUNG: Clear to percussion bilaterally; No rales, rhonchi, wheezing, or rubs  HEART: Regular rate and rhythm; No murmurs, rubs, or gallops  ABDOMEN: Soft, Nontender, Nondistended; Bowel sounds present  EXTREMITIES:  2+ Peripheral Pulses, No clubbing, cyanosis, or edema  SKIN: No rashes or lesions

## 2025-04-06 NOTE — DISCHARGE NOTE PROVIDER - PROVIDER TOKENS
PROVIDER:[TOKEN:[[id number]],FOLLOWUP:[1 week]],FREE:[LAST:[primary doctor],PHONE:[(   )    -],FAX:[(   )    -]]

## 2025-04-06 NOTE — PROGRESS NOTE ADULT - PROVIDER SPECIALTY LIST ADULT
Gastroenterology
Surgery
Critical Care
Hospitalist
Internal Medicine
Gastroenterology
Surgery
Critical Care
Hospitalist
Critical Care

## 2025-04-06 NOTE — DISCHARGE NOTE PROVIDER - NSDCMRMEDTOKEN_GEN_ALL_CORE_FT
aspirin 81 mg oral tablet, chewable: 1 tab(s) orally once a day  atorvastatin 40 mg oral tablet: 1 tab(s) orally once a day  lisinopril-hydrochlorothiazide 20 mg-25 mg oral tablet: 1 tab(s) orally once a day  pantoprazole 40 mg oral delayed release tablet: 1 tab(s) orally once a day (before a meal)

## 2025-04-06 NOTE — PROGRESS NOTE ADULT - REASON FOR ADMISSION
GI bleed, BRBPR.

## 2025-04-06 NOTE — DISCHARGE NOTE PROVIDER - HOSPITAL COURSE
80-year-old male with a history of coronary artery disease (CAD) with two stents placed six years prior (currently on aspirin and Plavix), hyperlipidemia (HLD), and diverticulitis with recurrent lower gastrointestinal bleeding (LGIB) due to diverticular bleeding, presented on 3/30 with another episode of LGIB/bright red blood per rectum (BRBPR). This is similar to his last admission in January 2023. He reported six episodes of bleeding. CT angiography was negative for active bleeding. Colonoscopy on 4/1 revealed a large amount of blood in the right colon, suspected to be from diverticula, but the source could not be visualized or treated. CTA abdomen/pelvis on 4/1 showed no acute GI bleed and diffuse colonic diverticulosis. On 4/2, the patient experienced dizziness and a drop in blood pressure upon standing. His hemoglobin (Hb) on the evening of 4/1 was 7.2 g/dL, for which he received a packed red blood cell (PRBC) transfusion.     Patient was seen by GI/Surgery. Bleeding stopped on it own. Patient had two BMs and he noticed smear of blood only when wiping. H/H stable- 8.8/25.7 today. Aspirin re-started (as per GI -Dr. Jon-ok to restart).  Discuss options for long-term management of diverticular bleeding, considering the recurrent nature and difficulty in localizing the source. Appreciate Surgery recs.  Continue current aspirin and Plavix regimen. Monitor for orthostatic hypotension and assess for any potential cardiac contribution to his dizziness episode.  Patient is hemodynamically and clinically stable for discharge. He was observed walking around hospital halls- without any complaints of dizziness. Patient made aware that if moderate to severe bleeding would to occur again---to come back straight to the hospital. Otherwise follow up with his PCP and Gi outpatient.

## 2025-04-10 DIAGNOSIS — Z88.2 ALLERGY STATUS TO SULFONAMIDES: ICD-10-CM

## 2025-04-10 DIAGNOSIS — I25.10 ATHEROSCLEROTIC HEART DISEASE OF NATIVE CORONARY ARTERY WITHOUT ANGINA PECTORIS: ICD-10-CM

## 2025-04-10 DIAGNOSIS — K57.31 DIVERTICULOSIS OF LARGE INTESTINE WITHOUT PERFORATION OR ABSCESS WITH BLEEDING: ICD-10-CM

## 2025-04-10 DIAGNOSIS — E78.5 HYPERLIPIDEMIA, UNSPECIFIED: ICD-10-CM

## 2025-04-10 DIAGNOSIS — K92.2 GASTROINTESTINAL HEMORRHAGE, UNSPECIFIED: ICD-10-CM

## 2025-04-10 DIAGNOSIS — Z95.5 PRESENCE OF CORONARY ANGIOPLASTY IMPLANT AND GRAFT: ICD-10-CM

## 2025-04-10 DIAGNOSIS — Z79.82 LONG TERM (CURRENT) USE OF ASPIRIN: ICD-10-CM

## 2025-04-10 DIAGNOSIS — D62 ACUTE POSTHEMORRHAGIC ANEMIA: ICD-10-CM

## 2025-04-10 DIAGNOSIS — E87.6 HYPOKALEMIA: ICD-10-CM

## 2025-04-10 DIAGNOSIS — K64.8 OTHER HEMORRHOIDS: ICD-10-CM

## 2025-06-16 NOTE — PATIENT PROFILE ADULT - FUNCTIONAL ASSESSMENT - BASIC MOBILITY 4.
Alternate taking 650 mg to 1000 mg acetaminophen and 600 mg ibuprofen for pain. You can take ONE of these medications every 4 hours as long as you continue to ALTERNATE them. Failing to ALTERNATE the medications every four hours could result in damage to either your kidneys or liver.    You can also ice the area for additional antiinflammatory affects. Never apply ice directly to the skin, make sure there is a towel or surface in between.    It is difficult for us to diagnose a concussion while in the emergency department as often times symptoms are seen afterwards. Due to your injury you are at high risk to develop a concussion. Please see the concussion instructions attached for more information on how to manage concussion symptoms and what you can expect to experience in the next few weeks.    If your face or scalp swells, apply an ice pack for 20 minutes every 1 to 2 hours. Do this until the swelling starts to go down. You can make an ice pack by putting ice cubes in a plastic bag and wrapping the bag in a towel. Never apply ice directly to the skin. You may use acetaminophen to control pain.    For the next 24 hours:  Don’t drink alcohol or take sedatives or medicines that make you sleepy.  Don’t drive or operate machinery.  Avoid doing anything strenuous. Don’t lift or strain.    Don’t return to sports or any activity that could cause you to hit your head until all symptoms are gone and you have been cleared by your doctor. A second head injury before fully recovering from the first one can lead to serious brain injury.  Avoid doing activities that require a lot of concentration or a lot of attention. This will allow your brain to rest and heal quicker.  
4 = No assist / stand by assistance